# Patient Record
Sex: MALE | Race: WHITE | Employment: OTHER | URBAN - METROPOLITAN AREA
[De-identification: names, ages, dates, MRNs, and addresses within clinical notes are randomized per-mention and may not be internally consistent; named-entity substitution may affect disease eponyms.]

---

## 2017-02-13 ENCOUNTER — TELEPHONE (OUTPATIENT)
Dept: FAMILY MEDICINE | Facility: CLINIC | Age: 62
End: 2017-02-13

## 2017-02-13 DIAGNOSIS — E11.42 TYPE 2 DIABETES MELLITUS WITH DIABETIC POLYNEUROPATHY, WITHOUT LONG-TERM CURRENT USE OF INSULIN (H): ICD-10-CM

## 2017-02-13 DIAGNOSIS — E13.40 DIABETIC NEUROPATHY ASSOCIATED WITH OTHER SPECIFIED DIABETES MELLITUS: Primary | ICD-10-CM

## 2017-02-13 RX ORDER — GLIMEPIRIDE 4 MG/1
4 TABLET ORAL
Qty: 90 TABLET | Refills: 1 | Status: SHIPPED | OUTPATIENT
Start: 2017-02-13 | End: 2018-03-29

## 2017-02-13 NOTE — TELEPHONE ENCOUNTER
Glyburide-Metformin 5-500mg is no longer covered by the patient's insurance. We need to either pursue a prior auth or split the medications up into 2 Rx's. Please let the pharmacy know what you would like to do.      Thanks.    Alexandra Huff, [facility]    On behalf of [facility]

## 2017-02-13 NOTE — TELEPHONE ENCOUNTER
Pari let patient know that insurance not covering current combo med and requesting two. Will send prescription and please notify that change made.

## 2017-02-27 DIAGNOSIS — E11.42 TYPE 2 DIABETES MELLITUS WITH DIABETIC POLYNEUROPATHY, WITHOUT LONG-TERM CURRENT USE OF INSULIN (H): ICD-10-CM

## 2017-02-27 DIAGNOSIS — E13.40 DIABETIC NEUROPATHY ASSOCIATED WITH OTHER SPECIFIED DIABETES MELLITUS: ICD-10-CM

## 2017-02-27 NOTE — TELEPHONE ENCOUNTER
Dr. Nguyen, please see telephone encounter 2-21-17 from pharmacist Sheree. Sheree spoke with this nurse today stating that new prescription for metformin is twice what is was and the patient is very concerned this is double what was prescribed with glucovance combo medication. Please review and advise for patient clarification.  Ailin

## 2017-03-01 NOTE — TELEPHONE ENCOUNTER
I called and spoke with patient  He is not happy and will NOT take an increased dose of metformin, has to many side effects, he is up set as his insurance no longer will cover gluco rodriguez (which he alredy does not take consistently due to side effects)  Currently he is taking 1 tab per day and occ 2 tabs BID, but has many side effects  He is tired and frustrated with having diabetes and having the medical field hound him with repeated letters and changes    I tried to discussed other option diet change , weight loss or different medication   He is just tired of it all, he has had some bad experiences with the medical field in past  At this time he will continue taking the gluco rodriguez  At least one tab per day , watch  His diet and get an  A1c done in next couple months  And make a f/u appt   Will call as need  I did offer the DE and he declined   [facility]  Clinic  RN/Calin Silva

## 2017-03-01 NOTE — TELEPHONE ENCOUNTER
Ok. Updated prescription.    Lab Results   Component Value Date    A1C 7.2 08/09/2016          Patient is overdue for follow-up so can discuss effectiveness then.

## 2017-03-15 DIAGNOSIS — I10 HYPERTENSION GOAL BP (BLOOD PRESSURE) < 140/80: ICD-10-CM

## 2017-03-15 RX ORDER — LISINOPRIL 20 MG/1
20 TABLET ORAL DAILY
Qty: 30 TABLET | Refills: 0 | Status: SHIPPED | OUTPATIENT
Start: 2017-03-15 | End: 2017-04-14

## 2017-03-15 NOTE — TELEPHONE ENCOUNTER
Recommend BP check. Recommend BPGAP. Offer MTM.  Prescription approved per [facility] Refill Protocol.      Kristina Sarah, Pharm.D.  on behalf of [facility] Pharmacist   [email or web address]

## 2017-03-15 NOTE — TELEPHONE ENCOUNTER
Lisinopril      Last Written Prescription Date: 08/09/2016  Last Fill Quantity: 90, # refills: 1  Last Office Visit with G, P or [facility] prescribing provider: 08/09/2016       Potassium   Date Value Ref Range Status   08/09/2016 4.3 3.4 - 5.3 mmol/L Final     Creatinine   Date Value Ref Range Status   08/09/2016 1.11 0.66 - 1.25 mg/dL Final     BP Readings from Last 3 Encounters:   10/04/16 (!) 148/92   08/09/16 120/80   12/08/15 137/80     Mary Wharton, [facility]  Float Technician  Calin Silva

## 2017-03-21 ENCOUNTER — TELEPHONE (OUTPATIENT)
Dept: FAMILY MEDICINE | Facility: CLINIC | Age: 62
End: 2017-03-21

## 2017-03-21 DIAGNOSIS — E11.42 TYPE 2 DIABETES MELLITUS WITH DIABETIC POLYNEUROPATHY, WITHOUT LONG-TERM CURRENT USE OF INSULIN (H): Primary | ICD-10-CM

## 2017-03-21 NOTE — TELEPHONE ENCOUNTER
Due to poor adherence to metformin and glimepiride medications separately, pharmacy called insurance to obtain approval for glucovance 500/5 po bid. Pt has requested to resume medication and discontinue most recent orders for metformin and glimepiride.    Please resend new order.    Sheree Smith, [facility]  [phone]

## 2017-03-21 NOTE — TELEPHONE ENCOUNTER
Is this ok to send for Glucovance 500/5 po Bid?  Please see message below?    Thank you  Sofia GROSSMAN RN

## 2017-03-21 NOTE — TELEPHONE ENCOUNTER
Received response from [employer] [facility] 03/21/2017 Re: Glyburide/Metformin    Response provided to the pharmacy.        Lopez Kline RT (r)  [facility]

## 2017-03-24 RX ORDER — GLYBURIDE-METFORMIN HYDROCHLORIDE 5; 500 MG/1; MG/1
2 TABLET ORAL 2 TIMES DAILY WITH MEALS
Qty: 360 TABLET | Refills: 0 | Status: SHIPPED | OUTPATIENT
Start: 2017-03-24 | End: 2017-07-14

## 2017-04-14 DIAGNOSIS — I10 HYPERTENSION GOAL BP (BLOOD PRESSURE) < 140/80: ICD-10-CM

## 2017-04-14 RX ORDER — LISINOPRIL 20 MG/1
TABLET ORAL
Qty: 90 TABLET | Refills: 0 | Status: SHIPPED | OUTPATIENT
Start: 2017-04-14 | End: 2017-07-14

## 2017-04-14 NOTE — TELEPHONE ENCOUNTER
Prescription approved per [facility] Refill Protocol.  Dolly Gaona RN

## 2017-05-02 ENCOUNTER — TELEPHONE (OUTPATIENT)
Dept: FAMILY MEDICINE | Facility: CLINIC | Age: 62
End: 2017-05-02

## 2017-05-02 NOTE — LETTER
[facility]  [address] [place] [id number] [phone]      May 2, 2017      Juan Carlos Rizo  [address]  [place] [address]        Dear Juan Carlos,     As part of [facility]'s commitment to health and wellness we have recently reviewed your chart and your medical record indicates that you are due for one or more of the following:    -- A diabetic /  Blood pressure check appointment with fasting blood work. The last lab results we have for you were from 8/9/2016. We like to see you every 6 months to be sure we are doing everything we can to keep your diabetes under control. Please call to schedule an appointment. Also, plan to come fasting at least 8-10 hours prior to your appointment.    -- Diabetic eye exam. Blood sugar levels can negatively affect your eyesight, this is why it is recommended to have an eye exam once a year. Please call your eye clinic to schedule an appointment. If you have already completed your eye exam for the year please have your eye clinic fax us your last office visit notes to [phone] so we can update your chart.    -- Colonoscopy. The last FIT that we have on file for you was from 10/2010. Your FIT test was positive and at that time it was recommended that you have a full colonoscopy. I do not see that this was ever completed. Please call one of the following numbers to schedule a colonoscopy:  [facility] [phone]  [facility] [phone]  [facility] [phone]  [facility] [phone] (multiple sites, call for locations)    Please try to schedule and/or complete the tests above within the next 2-4 weeks.   The number to call to schedule an appointment at [facility] is [phone].    While we work hard to maintain accurate records on all our patients, it is always possible that this notice does not accurately reflect tests that you may have had. To ensure that we do not continue to send you notices  please verify, at your next visit or by a [employer] message, that we have accurate dates of your tests, even if these were done many years ago.     Working together for your health is our goal.    Thank you for choosing [facility] for your healthcare needs.      Sincerely,     Rangel Nguyen MD / kate

## 2017-05-02 NOTE — TELEPHONE ENCOUNTER
Panel Management Review      Patient has the following on his problem list:     Diabetes    ASA: Passed    Last A1C  Lab Results   Component Value Date    A1C 7.2 08/09/2016    A1C 8.6 04/04/2016    A1C 8.4 12/08/2015    A1C 11.8 06/26/2015    A1C 9.1 06/10/2014     A1C tested: Passed    Last LDL:    Lab Results   Component Value Date    CHOL 178 06/01/2012     Lab Results   Component Value Date    HDL 40 06/01/2012     Lab Results   Component Value Date    LDL 67 12/08/2015    LDL 87 06/01/2012     Lab Results   Component Value Date    TRIG 255 06/01/2012     Lab Results   Component Value Date    CHOLHDLRATIO 4.0 06/01/2012     No results found for: NHDL    Is the patient on a Statin? YES             Is the patient on Aspirin? YES    Medications     HMG CoA Reductase Inhibitors    simvastatin (ZOCOR) 40 MG tablet    Salicylates    aspirin 81 MG tablet          Last three blood pressure readings:  BP Readings from Last 3 Encounters:   10/04/16 (!) 148/92   08/09/16 120/80   12/08/15 137/80       Date of last diabetes office visit: 8/9/16     Tobacco History:     History   Smoking Status     Never Smoker   Smokeless Tobacco     Never Used           IVD   ASA: Passed    Last LDL:    Lab Results   Component Value Date    CHOL 178 06/01/2012     Lab Results   Component Value Date    HDL 40 06/01/2012     Lab Results   Component Value Date    LDL 67 12/08/2015    LDL 87 06/01/2012     Lab Results   Component Value Date    TRIG 255 06/01/2012        Lab Results   Component Value Date    CHOLHDLRATIO 4.0 06/01/2012        Is the patient on a Statin? YES   Is the patient on Aspirin? YES                  Medications     HMG CoA Reductase Inhibitors    simvastatin (ZOCOR) 40 MG tablet    Salicylates    aspirin 81 MG tablet          Last three blood pressure readings:  BP Readings from Last 3 Encounters:   10/04/16 (!) 148/92   08/09/16 120/80   12/08/15 137/80        Tobacco History:     History   Smoking Status     Never  Smoker   Smokeless Tobacco     Never Used       Hypertension   Last three blood pressure readings:  BP Readings from Last 3 Encounters:   10/04/16 (!) 148/92   08/09/16 120/80   12/08/15 137/80     Blood pressure: FAILED    HTN Guidelines:  Age 18-59 BP range:  Less than 140/90  Age 60-85 with Diabetes:  Less than 140/90  Age 60-85 without Diabetes:  less than 150/90      Composite cancer screening  Chart review shows that this patient is due/due soon for the following Colonoscopy (positive FIT test 10/2010)  Summary:    Patient is due/failing the following:   Blood pressure check, DM office visit, lipid, A1c, microalbumin, eye exam, pneumovax/[id number], colonoscopy (positive FIT test 10/2010)    Action needed:   Referral for colonoscopy, DM eye exam  Needs office visit for DM/BP recheck office visit with fasting labs-can update pneumovax/[id number] at this time    Type of outreach:    Sent letter.    Questions for provider review:    None                                                                                                                                    Gay LOPEZ       Chart routed to none .

## 2017-05-08 DIAGNOSIS — E13.40 DIABETIC NEUROPATHY ASSOCIATED WITH OTHER SPECIFIED DIABETES MELLITUS: ICD-10-CM

## 2017-05-08 DIAGNOSIS — E11.42 TYPE 2 DIABETES MELLITUS WITH DIABETIC POLYNEUROPATHY, WITHOUT LONG-TERM CURRENT USE OF INSULIN (H): ICD-10-CM

## 2017-05-08 LAB — HBA1C MFR BLD: 9.4 % (ref 4.3–6)

## 2017-05-08 PROCEDURE — 83036 HEMOGLOBIN GLYCOSYLATED A1C: CPT | Performed by: FAMILY MEDICINE

## 2017-05-08 PROCEDURE — 36415 COLL VENOUS BLD VENIPUNCTURE: CPT | Performed by: FAMILY MEDICINE

## 2017-07-07 ENCOUNTER — TELEPHONE (OUTPATIENT)
Dept: FAMILY MEDICINE | Facility: CLINIC | Age: 62
End: 2017-07-07

## 2017-07-07 NOTE — TELEPHONE ENCOUNTER
Panel Management Review      Patient has the following on his problem list:     Diabetes    ASA: Passed    Last A1C  Lab Results   Component Value Date    A1C 9.4 05/08/2017    A1C 7.2 08/09/2016    A1C 8.6 04/04/2016    A1C 8.4 12/08/2015    A1C 11.8 06/26/2015     A1C tested: FAILED    Last LDL:    Lab Results   Component Value Date    CHOL 178 06/01/2012     Lab Results   Component Value Date    HDL 40 06/01/2012     Lab Results   Component Value Date    LDL 67 12/08/2015    LDL 87 06/01/2012     Lab Results   Component Value Date    TRIG 255 06/01/2012     Lab Results   Component Value Date    CHOLHDLRATIO 4.0 06/01/2012     No results found for: NHDL    Is the patient on a Statin? YES             Is the patient on Aspirin? YES    Medications     HMG CoA Reductase Inhibitors    simvastatin (ZOCOR) 40 MG tablet    Salicylates    aspirin 81 MG tablet          Last three blood pressure readings:  BP Readings from Last 3 Encounters:   10/04/16 (!) 148/92   08/09/16 120/80   12/08/15 137/80       Date of last diabetes office visit: last A1c 5/8/17, last DM office visit 8/9/16     Tobacco History:     History   Smoking Status     Never Smoker   Smokeless Tobacco     Never Used           IVD   ASA: Passed    Last LDL:    Lab Results   Component Value Date    CHOL 178 06/01/2012     Lab Results   Component Value Date    HDL 40 06/01/2012     Lab Results   Component Value Date    LDL 67 12/08/2015    LDL 87 06/01/2012     Lab Results   Component Value Date    TRIG 255 06/01/2012        Lab Results   Component Value Date    CHOLHDLRATIO 4.0 06/01/2012        Is the patient on a Statin? YES   Is the patient on Aspirin? YES                  Medications     HMG CoA Reductase Inhibitors    simvastatin (ZOCOR) 40 MG tablet    Salicylates    aspirin 81 MG tablet          Last three blood pressure readings:  BP Readings from Last 3 Encounters:   10/04/16 (!) 148/92   08/09/16 120/80   12/08/15 137/80        Tobacco  History:     History   Smoking Status     Never Smoker   Smokeless Tobacco     Never Used       Hypertension   Last three blood pressure readings:  BP Readings from Last 3 Encounters:   10/04/16 (!) 148/92   08/09/16 120/80   12/08/15 137/80     Blood pressure: Passed    HTN Guidelines:  Age 18-59 BP range:  Less than 140/90  Age 60-85 with Diabetes:  Less than 140/90  Age 60-85 without Diabetes:  less than 150/90      Composite cancer screening  Chart review shows that this patient is due/due soon for the following Colonoscopy - positive FIT test 10/2010  Summary:    Patient is due/failing the following:   BP recheck  DM office visit w/ fasting labs  DM eye exam  Colonoscopy/FIT - had positive FIT 10/2010 - colonoscopy was recommended but not done    Action needed:   None at this time.   He had labs done on 5/8/17 and Dr Nguyen recommended a visit on his lab notes that were sent to the patient. See lab result note.     Type of outreach:    none - see above    Questions for provider review:    None                                                                                                                                    Gay LOPEZ       Chart routed to none .

## 2017-07-14 DIAGNOSIS — I10 HYPERTENSION GOAL BP (BLOOD PRESSURE) < 140/80: ICD-10-CM

## 2017-07-14 DIAGNOSIS — E11.42 TYPE 2 DIABETES MELLITUS WITH DIABETIC POLYNEUROPATHY, WITHOUT LONG-TERM CURRENT USE OF INSULIN (H): ICD-10-CM

## 2017-07-14 RX ORDER — GLYBURIDE-METFORMIN HYDROCHLORIDE 5; 500 MG/1; MG/1
TABLET ORAL
Qty: 360 TABLET | Refills: 0 | Status: SHIPPED | OUTPATIENT
Start: 2017-07-14 | End: 2017-10-19

## 2017-07-14 RX ORDER — LISINOPRIL 20 MG/1
TABLET ORAL
Qty: 90 TABLET | Refills: 0 | Status: SHIPPED | OUTPATIENT
Start: 2017-07-14 | End: 2017-10-19

## 2017-07-14 NOTE — TELEPHONE ENCOUNTER
glyBURIDE-metFORMIN (GLUCOVANCE) 5-500 MG per tablet         Last Written Prescription Date: 3/24/17  Last Fill Quantity: 360, # refills: 0  Last Office Visit with [facility], [facility] or [facility] prescribing provider:  8/9/16        BP Readings from Last 3 Encounters:   10/04/16 (!) 148/92   08/09/16 120/80   12/08/15 137/80     Lab Results   Component Value Date    MICROL 6 06/26/2015     Lab Results   Component Value Date    UMALCR 11.56 06/26/2015     Creatinine   Date Value Ref Range Status   08/09/2016 1.11 0.66 - 1.25 mg/dL Final   ]  GFR Estimate   Date Value Ref Range Status   08/09/2016 68 >60 mL/min/1.7m2 Final     Comment:     Non  GFR Calc   06/26/2015 76 >60 mL/min/1.7m2 Final     Comment:     Non  GFR Calc   11/04/2013 89 >60 mL/min/1.7m2 Final   11/04/2013 >90 >60 mL/min/1.7m2 Final     GFR Estimate If Black   Date Value Ref Range Status   08/09/2016 82 >60 mL/min/1.7m2 Final     Comment:      GFR Calc   06/26/2015 >90   GFR Calc   >60 mL/min/1.7m2 Final   11/04/2013 >90 >60 mL/min/1.7m2 Final   11/04/2013 >90 >60 mL/min/1.7m2 Final     Lab Results   Component Value Date    CHOL 178 06/01/2012     Lab Results   Component Value Date    HDL 40 06/01/2012     Lab Results   Component Value Date    LDL 67 12/08/2015    LDL 87 06/01/2012     Lab Results   Component Value Date    TRIG 255 06/01/2012     Lab Results   Component Value Date    CHOLHDLRATIO 4.0 06/01/2012     Lab Results   Component Value Date    AST 27 08/04/2009     Lab Results   Component Value Date    ALT 26 06/01/2012     Lab Results   Component Value Date    A1C 9.4 05/08/2017    A1C 7.2 08/09/2016    A1C 8.6 04/04/2016    A1C 8.4 12/08/2015    A1C 11.8 06/26/2015     Potassium   Date Value Ref Range Status   08/09/2016 4.3 3.4 - 5.3 mmol/L Final             lisinopril (PRINIVIL/ZESTRIL) 20 MG tablet      Last Written Prescription Date: 4/14/17  Last Fill Quantity: 90, # refills: 0  Last  Office Visit with G, P or [facility] prescribing provider: 8/9/16       Potassium   Date Value Ref Range Status   08/09/2016 4.3 3.4 - 5.3 mmol/L Final     Creatinine   Date Value Ref Range Status   08/09/2016 1.11 0.66 - 1.25 mg/dL Final     BP Readings from Last 3 Encounters:   10/04/16 (!) 148/92   08/09/16 120/80   12/08/15 137/80

## 2017-07-14 NOTE — TELEPHONE ENCOUNTER
Routing refill request to provider for review/approval because:  Patient was to follow-up in clinic. Overdue for visit. America Vera RN

## 2018-01-18 DIAGNOSIS — E11.42 TYPE 2 DIABETES MELLITUS WITH DIABETIC POLYNEUROPATHY, WITHOUT LONG-TERM CURRENT USE OF INSULIN (H): ICD-10-CM

## 2018-01-18 DIAGNOSIS — I10 HYPERTENSION GOAL BP (BLOOD PRESSURE) < 140/80: ICD-10-CM

## 2018-01-19 NOTE — TELEPHONE ENCOUNTER
"Requested Prescriptions   Pending Prescriptions Disp Refills     glyBURIDE-metFORMIN (GLUCOVANCE) 5-500 MG per tablet [Pharmacy Med Name: GLYBURIDE-METFORMIN 5-500MG TABS] 360 tablet 0    Last Written Prescription Date:  10/20/2017 #360 x 0  Last filled 10/20/2017  Last Office Visit with [facility], [facility] or [facility] prescribing provider:  08/09/2016 GLADYS Nguyen   Future Office Visit:  none    Sig: TAKE TWO TABLETS BY MOUTH TWICE A DAY WITH MEALS    Combination Oral Antihyperglycemic Agents Failed    1/18/2018  9:42 AM       Failed - Patient's BP is less than 140/90    BP Readings from Last 3 Encounters:   10/04/16 (!) 148/92   08/09/16 120/80   12/08/15 137/80                Failed - Patient has a documented LDL level within past 12 mos.    Recent Labs   Lab Test  12/08/15   1626   LDL  67            Failed - Patient has a documented Microalbumin level within past 12 mos.    Recent Labs   Lab Test  06/26/15   1423   MICROL  6   UMALCR  11.56            Failed - Patient has documented A1c within the specified period of time.    Recent Labs   Lab Test  05/08/17   1427   A1C  9.4*            Failed - Patient has had appt within past 6 mos    Patient had office visit in the last 6 months or has a visit in the next 30 days with authorizing provider.  See \"Patient Info\" tab in inbasket, or \"Choose Columns\" in Meds & Orders section of the refill encounter.           Passed - Patient's CR is NOT>1.4 OR Patient's EGFR is NOT<45 within past 12 mos.    Recent Labs   Lab Test  08/09/16   1400   GFRESTIMATED  68   GFRESTBLACK  82       Recent Labs   Lab Test  08/09/16   1400   CR  1.11            Passed - Patient does not have a diagnosis of CHF.       Passed - Patient is 18 years old or older.        lisinopril (PRINIVIL/ZESTRIL) 20 MG tablet [Pharmacy Med Name: LISINOPRIL 20MG TABS] 90 tablet 0    Last Written Prescription Date:  10/20/2017 #90 x 0  Last filled 10/20/2017  Last Office Visit with [facility], [facility] or [facility] prescribing " "provider:  08/09/2016 GLADYS Nguyen   Future Office Visit:   none   Sig: TAKE ONE TABLET BY MOUTH EVERY DAY. -- DUE FOR LABS IN JUNE    ACE Inhibitors (Including Combos) Protocol Failed    1/18/2018  9:42 AM       Failed - Blood pressure under 140/90    BP Readings from Last 3 Encounters:   10/04/16 (!) 148/92   08/09/16 120/80   12/08/15 137/80                Failed - Recent or future visit with authorizing provider's specialty    Patient had office visit in the last year or has a visit in the next 30 days with authorizing provider.  See \"Patient Info\" tab in inbasket, or \"Choose Columns\" in Meds & Orders section of the refill encounter.            Failed - Normal serum creatinine on file in past 12 months    Recent Labs   Lab Test  08/09/16   1400   CR  1.11            Failed - Normal serum potassium on file in past 12 months    Recent Labs   Lab Test  08/09/16   1400   POTASSIUM  4.3            Passed - Patient is age 18 or older          "

## 2018-01-23 RX ORDER — LISINOPRIL 20 MG/1
20 TABLET ORAL DAILY
Qty: 30 TABLET | Refills: 0 | Status: SHIPPED | OUTPATIENT
Start: 2018-01-23 | End: 2018-02-16

## 2018-01-23 RX ORDER — GLYBURIDE-METFORMIN HYDROCHLORIDE 5; 500 MG/1; MG/1
2 TABLET ORAL 2 TIMES DAILY WITH MEALS
Qty: 120 TABLET | Refills: 0 | Status: SHIPPED | OUTPATIENT
Start: 2018-01-23 | End: 2018-02-16

## 2018-01-23 NOTE — TELEPHONE ENCOUNTER
Medication is being filled for 1 time refill only due to: 30 days only  Over due for office visit and/or labs   LEÓN Baca  RN/Calin Silva

## 2018-02-16 DIAGNOSIS — E11.42 TYPE 2 DIABETES MELLITUS WITH DIABETIC POLYNEUROPATHY, WITHOUT LONG-TERM CURRENT USE OF INSULIN (H): ICD-10-CM

## 2018-02-16 DIAGNOSIS — I10 HYPERTENSION GOAL BP (BLOOD PRESSURE) < 140/80: ICD-10-CM

## 2018-02-16 NOTE — TELEPHONE ENCOUNTER
"Requested Prescriptions   Pending Prescriptions Disp Refills     glyBURIDE-metFORMIN (GLUCOVANCE) 5-500 MG per tablet 120 tablet 0    Last Written Prescription Date:  01/23/2018  #120 x 0  Last filled - not provided  Last office visit: 8/9/2016 GLADYS Nguyen   Future Office Visit: none    Sig: Take 2 tablets by mouth 2 times daily (with meals) MUSTbe seenin office by new provider to establish care for ongoing refills plz make appt now [phone]    Combination Oral Antihyperglycemic Agents Failed    2/16/2018 12:15 PM       Failed - Blood pressure under 140/90 in past 12 months    BP Readings from Last 3 Encounters:   10/04/16 (!) 148/92   08/09/16 120/80   12/08/15 137/80                Failed - Patient has a documented LDL level within past 12 mos.    Recent Labs   Lab Test  12/08/15   1626   LDL  67            Failed - Patient has a documented Microalbumin level within past 12 mos.    Recent Labs   Lab Test  06/26/15   1423   MICROL  6   UMALCR  11.56            Failed - Patient has documented A1c within the specified period of time.    Recent Labs   Lab Test  05/08/17   1427   A1C  9.4*            Failed - Patient has had appt within past 6 mos    Patient had office visit in the last 6 months or has a visit in the next 30 days with authorizing provider.  See \"Patient Info\" tab in inbasket, or \"Choose Columns\" in Meds & Orders section of the refill encounter.           Passed - Patient's CR is NOT>1.4 OR Patient's EGFR is NOT<45 within past 12 mos.    Recent Labs   Lab Test  08/09/16   1400   GFRESTIMATED  68   GFRESTBLACK  82       Recent Labs   Lab Test  08/09/16   1400   CR  1.11            Passed - Patient does not have a diagnosis of CHF.       Passed - Patient is 18 years old or older.        lisinopril (PRINIVIL/ZESTRIL) 20 MG tablet 30 tablet 0    Last Written Prescription Date:  01/23/2018 #30 x 0  Last filled - not provided  Last office visit: 8/9/2016 GLADYS Nguyen  Future Office Visit:  none   Sig: " "Take 1 tablet (20 mg) by mouth daily MUSTbe seenin office by new provider to establish care for ongoing refills plz make appt now [phone]    ACE Inhibitors (Including Combos) Protocol Failed    2/16/2018 12:15 PM       Failed - Blood pressure under 140/90 in past 12 months    BP Readings from Last 3 Encounters:   10/04/16 (!) 148/92   08/09/16 120/80   12/08/15 137/80                Failed - Recent or future visit with authorizing provider's specialty    Patient had office visit in the last year or has a visit in the next 30 days with authorizing provider.  See \"Patient Info\" tab in inbasket, or \"Choose Columns\" in Meds & Orders section of the refill encounter.            Failed - Normal serum creatinine on file in past 12 months    Recent Labs   Lab Test  08/09/16   1400   CR  1.11            Failed - Normal serum potassium on file in past 12 months    Recent Labs   Lab Test  08/09/16   1400   POTASSIUM  4.3            Passed - Patient is age 18 or older          "

## 2018-02-20 RX ORDER — GLYBURIDE-METFORMIN HYDROCHLORIDE 5; 500 MG/1; MG/1
2 TABLET ORAL 2 TIMES DAILY WITH MEALS
Qty: 120 TABLET | Refills: 0 | Status: SHIPPED | OUTPATIENT
Start: 2018-02-20 | End: 2018-03-16

## 2018-02-20 RX ORDER — LISINOPRIL 20 MG/1
20 TABLET ORAL DAILY
Qty: 30 TABLET | Refills: 0 | Status: SHIPPED | OUTPATIENT
Start: 2018-02-20 | End: 2018-03-16

## 2018-03-16 DIAGNOSIS — I10 HYPERTENSION GOAL BP (BLOOD PRESSURE) < 140/80: ICD-10-CM

## 2018-03-16 DIAGNOSIS — E11.42 TYPE 2 DIABETES MELLITUS WITH DIABETIC POLYNEUROPATHY, WITHOUT LONG-TERM CURRENT USE OF INSULIN (H): ICD-10-CM

## 2018-03-19 NOTE — TELEPHONE ENCOUNTER
"Requested Prescriptions   Pending Prescriptions Disp Refills     glyBURIDE-metFORMIN (GLUCOVANCE) 5-500 MG per tablet 120 tablet 0    Last Written Prescription Date:  02/20/2018 #120 x 0  Last filled - not provided  Last office visit: 8/9/2016 GLADYS Nguyen  Future Office Visit: None     Sig: Take 2 tablets by mouth 2 times daily (with meals) (Needs follow-up appointment for this medication)    Combination Oral Antihyperglycemic Agents Failed    3/16/2018  1:05 PM       Failed - Blood pressure under 140/90 in past 12 months    BP Readings from Last 3 Encounters:   10/04/16 (!) 148/92   08/09/16 120/80   12/08/15 137/80                Failed - Patient has a documented LDL level within past 12 mos.    Recent Labs   Lab Test  12/08/15   1626   LDL  67            Failed - Patient has a documented Microalbumin level within past 12 mos.    Recent Labs   Lab Test  06/26/15   1423   MICROL  6   UMALCR  11.56            Failed - Patient has documented A1c within the specified period of time.    Recent Labs   Lab Test  05/08/17   1427   A1C  9.4*            Failed - Recent (6 mo) or future (30 days) visit within the authorizing provider's specialty    Patient had office visit in the last 6 months or has a visit in the next 30 days with authorizing provider or within the authorizing provider's specialty.  See \"Patient Info\" tab in inbasket, or \"Choose Columns\" in Meds & Orders section of the refill encounter.           Passed - Patient's CR is NOT>1.4 OR Patient's EGFR is NOT<45 within past 12 mos.    Recent Labs   Lab Test  08/09/16   1400   GFRESTIMATED  68   GFRESTBLACK  82       Recent Labs   Lab Test  08/09/16   1400   CR  1.11            Passed - Patient does not have a diagnosis of CHF.       Passed - Patient is 18 years old or older.        lisinopril (PRINIVIL/ZESTRIL) 20 MG tablet 30 tablet 0    Last Written Prescription Date:  02/20/2018 #30 x 0  Last filled - not provided  Last office visit: 8/9/2016 GLADYS Nguyen " "  Future Office Visit:  None   Sig: Take 1 tablet (20 mg) by mouth daily (Needs follow-up appointment for this medication)    ACE Inhibitors (Including Combos) Protocol Failed    3/16/2018  1:05 PM       Failed - Blood pressure under 140/90 in past 12 months    BP Readings from Last 3 Encounters:   10/04/16 (!) 148/92   08/09/16 120/80   12/08/15 137/80                Failed - Recent (12 mo) or future (30 days) visit within the authorizing provider's specialty    Patient had office visit in the last 12 months or has a visit in the next 30 days with authorizing provider or within the authorizing provider's specialty.  See \"Patient Info\" tab in inbasket, or \"Choose Columns\" in Meds & Orders section of the refill encounter.           Failed - Normal serum creatinine on file in past 12 months    Recent Labs   Lab Test  08/09/16   1400   CR  1.11            Failed - Normal serum potassium on file in past 12 months    Recent Labs   Lab Test  08/09/16   1400   POTASSIUM  4.3            Passed - Patient is age 18 or older          "

## 2018-03-20 NOTE — TELEPHONE ENCOUNTER
Routing refill request to provider for review/approval because:  Overdue for labs and visit. America Vera RN

## 2018-03-20 NOTE — TELEPHONE ENCOUNTER
Please advise office visit follow-up since no office visit sine 8/2016 .  Melvi refill was provided last month.  Ok to refill for 14 days to allow time for patient to see provider.  Marlena Saldivar MD

## 2018-03-23 RX ORDER — GLYBURIDE-METFORMIN HYDROCHLORIDE 5; 500 MG/1; MG/1
2 TABLET ORAL 2 TIMES DAILY WITH MEALS
Qty: 56 TABLET | Refills: 0 | Status: SHIPPED | OUTPATIENT
Start: 2018-03-23 | End: 2018-03-29

## 2018-03-23 RX ORDER — LISINOPRIL 20 MG/1
20 TABLET ORAL DAILY
Qty: 14 TABLET | Refills: 0 | Status: SHIPPED | OUTPATIENT
Start: 2018-03-23 | End: 2018-03-29

## 2018-03-24 ENCOUNTER — HEALTH MAINTENANCE LETTER (OUTPATIENT)
Age: 63
End: 2018-03-24

## 2018-03-29 ENCOUNTER — OFFICE VISIT (OUTPATIENT)
Dept: FAMILY MEDICINE | Facility: CLINIC | Age: 63
End: 2018-03-29
Payer: COMMERCIAL

## 2018-03-29 DIAGNOSIS — E11.9 ENCOUNTER FOR DIABETIC FOOT EXAM (H): ICD-10-CM

## 2018-03-29 DIAGNOSIS — E78.5 HYPERLIPIDEMIA LDL GOAL <100: ICD-10-CM

## 2018-03-29 DIAGNOSIS — E11.42 TYPE 2 DIABETES MELLITUS WITH DIABETIC POLYNEUROPATHY, WITHOUT LONG-TERM CURRENT USE OF INSULIN (H): Primary | ICD-10-CM

## 2018-03-29 DIAGNOSIS — I10 HYPERTENSION GOAL BP (BLOOD PRESSURE) < 140/80: ICD-10-CM

## 2018-03-29 LAB — HBA1C MFR BLD: 10.5 % (ref 0–6.4)

## 2018-03-29 PROCEDURE — 99207 C FOOT EXAM  NO CHARGE: CPT | Performed by: NURSE PRACTITIONER

## 2018-03-29 PROCEDURE — 82043 UR ALBUMIN QUANTITATIVE: CPT | Performed by: NURSE PRACTITIONER

## 2018-03-29 PROCEDURE — 83036 HEMOGLOBIN GLYCOSYLATED A1C: CPT | Performed by: NURSE PRACTITIONER

## 2018-03-29 PROCEDURE — 99215 OFFICE O/P EST HI 40 MIN: CPT | Performed by: NURSE PRACTITIONER

## 2018-03-29 PROCEDURE — 80061 LIPID PANEL: CPT | Performed by: NURSE PRACTITIONER

## 2018-03-29 PROCEDURE — 84443 ASSAY THYROID STIM HORMONE: CPT | Performed by: NURSE PRACTITIONER

## 2018-03-29 PROCEDURE — 36415 COLL VENOUS BLD VENIPUNCTURE: CPT | Performed by: NURSE PRACTITIONER

## 2018-03-29 PROCEDURE — 80048 BASIC METABOLIC PNL TOTAL CA: CPT | Performed by: NURSE PRACTITIONER

## 2018-03-29 RX ORDER — LISINOPRIL 20 MG/1
20 TABLET ORAL DAILY
Qty: 90 TABLET | Refills: 0 | Status: SHIPPED | OUTPATIENT
Start: 2018-03-29 | End: 2018-08-15

## 2018-03-29 RX ORDER — GLYBURIDE-METFORMIN HYDROCHLORIDE 5; 500 MG/1; MG/1
TABLET ORAL
Qty: 270 TABLET | Refills: 0 | Status: SHIPPED | OUTPATIENT
Start: 2018-03-29 | End: 2018-08-15

## 2018-03-29 ASSESSMENT — ENCOUNTER SYMPTOMS
NAUSEA: 1
ENDOCRINE COMMENTS: HX OF TYPE 2 DM
WHEEZING: 0
PALPITATIONS: 0
CHOKING: 0
SHORTNESS OF BREATH: 0
VOMITING: 0
NUMBNESS: 1
CONSTIPATION: 0
ALLERGIC/IMMUNOLOGIC NEGATIVE: 1
PSYCHIATRIC NEGATIVE: 1
ABDOMINAL PAIN: 0
HEMATOLOGIC/LYMPHATIC NEGATIVE: 1
CONSTITUTIONAL NEGATIVE: 1
MUSCULOSKELETAL NEGATIVE: 1
DIARRHEA: 1

## 2018-03-29 NOTE — LETTER
March 30, 2018      Juan Carlos Rizo  [address]  [place] [address]        Dear ,    You are spilling protein into your urine.  This means that your kidneys are being damaged for prominent your diabetes being out of control.     Your triglycerides continue to be a bit high but they are better than they were 5 years ago when you had them checked.  Please try to decrease your carbohydrate intake.     Your electrolytes are all in normal range   Your kidney function is normal   Your thyroid is in normal range     As discussed at your appointment yesterday your hemoglobin A1c is up to 10.5 from 9.4.  Need to get this under better control to avoid long-term damage.     Resulted Orders   Albumin Random Urine Quantitative with Creat Ratio   Result Value Ref Range    Creatinine Urine 49 mg/dL    Albumin Urine mg/L 12 mg/L    Albumin Urine mg/g Cr 24.90 (H) 0 - 17 mg/g Cr   Lipid panel reflex to direct LDL Fasting   Result Value Ref Range    Cholesterol 158 <200 mg/dL    Triglycerides 185 (H) <150 mg/dL      Comment:      Borderline high:  150-199 mg/dl  High:             200-499 mg/dl  Very high:       >499 mg/dl  Non Fasting      HDL Cholesterol 43 >39 mg/dL    LDL Cholesterol Calculated 78 <100 mg/dL      Comment:      Desirable:       <100 mg/dl    Non HDL Cholesterol 115 <130 mg/dL   TSH with free T4 reflex   Result Value Ref Range    TSH 1.69 0.40 - 4.00 mU/L   Hemoglobin A1c   Result Value Ref Range    Hemoglobin A1C 10.5 (H) 0 - 6.4 %      Comment:      Normal <5.7% Prediabetes 5.7-6.4%  Diabetes 6.5% or higher - adopted from ADA   consensus guidelines.     Basic metabolic panel  (Ca, Cl, CO2, Creat, Gluc, K, Na, BUN)   Result Value Ref Range    Sodium 134 133 - 144 mmol/L    Potassium 4.2 3.4 - 5.3 mmol/L    Chloride 100 94 - 109 mmol/L    Carbon Dioxide 27 20 - 32 mmol/L    Anion Gap 7 3 - 14 mmol/L    Glucose 179 (H) 70 - 99 mg/dL      Comment:      Non Fasting    Urea Nitrogen 18 7 - 30 mg/dL     Creatinine 1.16 0.66 - 1.25 mg/dL    GFR Estimate 64 >60 mL/min/1.7m2      Comment:      Non  GFR Calc    GFR Estimate If Black 77 >60 mL/min/1.7m2      Comment:       GFR Calc    Calcium 9.0 8.5 - 10.1 mg/dL       If you have any questions or concerns, please call the clinic at the number listed above.       Sincerely,        LATHA Machuca CNP / jg

## 2018-03-29 NOTE — NURSING NOTE
"Chief Complaint   Patient presents with     Recheck Medication       Initial /76  Pulse 72  Temp 98.2  F (36.8  C) (Tympanic)  Ht 6' 2.25\" (1.886 m)  Wt 232 lb 6.4 oz (105.4 kg)  BMI 29.64 kg/m2 Estimated body mass index is 29.64 kg/(m^2) as calculated from the following:    Height as of this encounter: 6' 2.25\" (1.886 m).    Weight as of this encounter: 232 lb 6.4 oz (105.4 kg).  Medication Reconciliation: complete       Gay Guillen CMA([facility])      "

## 2018-03-29 NOTE — PROGRESS NOTES
SUBJECTIVE:   Juan Carlos Rizo is a 62 year old male who presents to clinic today for the following health issues:    Is currently switching eye doctors.    Diabetes Follow-up      Patient is checking blood sugars: not at all    Diabetic concerns: None     Symptoms of hypoglycemia (low blood sugar): none     Paresthesias (numbness or burning in feet) or sores: yes, numbness for years     Date of last diabetic eye exam: over a year ago, patient looking for a new eye doctor that can do surgery    Hyperlipidemia Follow-Up      Rate your low fat/cholesterol diet?: not monitoring fat    Taking statin?  Yes, no muscle aches from statin    Other lipid medications/supplements?:  none    Hypertension Follow-up      Outpatient blood pressures are being checked at work.  Results are 120s/80s.    Low Salt Diet: not monitoring salt    BP Readings from Last 2 Encounters:   03/29/18 140/76   10/04/16 (!) 148/92     Hemoglobin A1C (%)   Date Value   03/29/2018 10.5 (H)   05/08/2017 9.4 (H)     LDL Cholesterol Calculated (mg/dL)   Date Value   03/29/2018 78   06/01/2012 87     LDL Cholesterol Direct (mg/dL)   Date Value   12/08/2015 67       Amount of exercise or physical activity: 6-7 days/week for an average of greater than 60 minutes    Problems taking medications regularly: No    Medication side effects: none    Diet: regular (no restrictions)            Problem list and histories reviewed & adjusted, as indicated.  Additional history: as documented    Patient Active Problem List   Diagnosis     Impotence of organic origin     Metatarsalgia     Type 2 diabetes mellitus (H)     Hyperlipidemia LDL goal <100     Hereditary and idiopathic peripheral neuropathy     Occult blood in stools     Advanced directives, counseling/discussion     Insomnia     Torn meniscus     Hypertension goal BP (blood pressure) < 140/80     Diabetic neuropathy associated with other specified diabetes mellitus     Past Surgical History:   Procedure  Laterality Date     CL AFF SURGICAL PATHOLOGY  1981    Trauma     HC REMOVAL GALLBLADDER      Cholecystectomy     INCISION AND DRAINAGE FINGER, COMBINED  11/4/2013    Procedure: COMBINED INCISION AND DRAINAGE FINGER;  Incision & Drainage right index finger;  Surgeon: Ferdinand Cisneros MD;  Location: [facility]       Social History   Substance Use Topics     Smoking status: Never Smoker     Smokeless tobacco: Never Used     Alcohol use Yes      Comment: Occasional     Family History   Problem Relation Age of Onset     DIABETES Mother      Hypertension Brother      C.A.D. Brother      MI age 47     CEREBROVASCULAR DISEASE No family hx of      Breast Cancer No family hx of      Cancer - colorectal No family hx of      Prostate Cancer No family hx of          Current Outpatient Prescriptions   Medication Sig Dispense Refill     glyBURIDE-metFORMIN (GLUCOVANCE) 5-500 MG per tablet Take 1/2-1 tab daily then 2 tabs in the  tablet 0     lisinopril (PRINIVIL/ZESTRIL) 20 MG tablet Take 1 tablet (20 mg) by mouth daily (Needs follow-up appointment for this medication) 90 tablet 0     simvastatin (ZOCOR) 40 MG tablet TAKE ONE TABLET BY MOUTH AT BEDTIME 90 tablet 3     aspirin 81 MG tablet Take 1 tablet by mouth daily.       BLOOD GLUCOSE TEST STRIPS STRP PER PATIENT HEALTH PLAN 1 bottle rpn     Allergies   Allergen Reactions     Anesthetic [Amides] Nausea and Vomiting     Pt. Denies allergy to local anesthetics.  Had an episode of n/v with prior GA in 1981.  Pt. Has had prior amide local anesthetics without problems.     Recent Labs   Lab Test  03/29/18   1401  05/08/17   1427  08/09/16   1400   12/08/15   1626  06/26/15   1425   06/01/12   1535  06/14/11   1537  09/24/10   1526   A1C  10.5*  9.4*  7.2*   < >  8.4*  11.8*   < >  9.7*  9.6*  10.1*   LDL  78   --    --    --   67   --    --   87  99  114   HDL  43   --    --    --    --    --    --   40   --   49   TRIG  185*   --    --    --    --    --    --   255*   --    "145   ALT   --    --    --    --    --    --    --   26  27   --    CR  1.16   --   1.11   --    --   1.01   < >  1.01  1.14  0.95   GFRESTIMATED  64   --   68   --    --   76   < >  76  67  83   GFRESTBLACK  77   --   82   --    --   >90   GFR Calc     < >  >90  81  >90   POTASSIUM  4.2   --   4.3   --    --   3.7   < >  4.2  4.5  4.1   TSH  1.69   --    --    --    --   1.51   --   1.96   --   1.23    < > = values in this interval not displayed.      BP Readings from Last 3 Encounters:   03/29/18 140/76   10/04/16 (!) 148/92   08/09/16 120/80    Wt Readings from Last 3 Encounters:   03/29/18 232 lb 6.4 oz (105.4 kg)   08/09/16 225 lb 3.2 oz (102.2 kg)   12/08/15 232 lb (105.2 kg)                    Reviewed and updated as needed this visit by clinical staff  Tobacco  Allergies  Meds  Med Hx  Surg Hx  Fam Hx  Soc Hx      Reviewed and updated as needed this visit by Provider        Here today to establish care.  Needs to have refills of medications.  Is diabetic.  Does not check blood pressures at home.  Does not want to start on any additional medication for diabetes.  Has been on higher doses of medications and has felt like \".  Does not eat right away in the morning when he gets up.  Usually does not eat until noon meal.  Shit\" when was taking medications in the morning would have nausea, dizziness, and just overall feel ill.  Was not able to go to work.  Currently is taking 2 Glucovance in the p.m.  Does not ever want to start on insulin or any injectable medications.  Does not like coming to the office.  Does not know why has to come frequently.  Does not want to take glyburide and Metformin separately wants them to remain together in 1 pill.  Has previously needed to have a prior authorization filled out for this.  That PA was good for 1 year and needs to be filled out again.  Reports if has to take 2 pills will take any medications at all.  Found out was diabetic when had to have his " gallbladder emergently removed.  Reports poor diet.  Eats a lot of concentrated sweets.  Did go to diabetic education when was first diagnosed with diabetes feels it was a waste of time.  Has been sometime since was at the eye doctor.  Reports is currently looking for a new eye surgeon.  Just cannot find one that is good enough for him.    Has numbness and tingling to bilateral feet, reports that has been there for some time.  Reports has been there ever since was a child.  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  Does not check feet for sores.  Has had a previous injury to right eye where a daiana went through his eye.  This was a work-related issue.  Has no vision in his right eye.  States it is hard for him to even see his feet.  Has no intentions of starting to check them.    Has never had colonoscopy.  Declines wanting to have one.  Declines fit test.      ROS:  Review of Systems   Constitutional: Negative.  Negative for fatigue.   HENT: Negative.  Negative for congestion, ear pain, rhinorrhea, sinus pressure and sore throat.    Eyes:        Hx of right eye trauma   Respiratory: Negative for cough, choking, shortness of breath and wheezing.    Cardiovascular: Negative for chest pain, palpitations and leg swelling.        Hx of hyperlipidemia, htn     Gastrointestinal: Positive for diarrhea (side effect of medications) and nausea (side effect of medication). Negative for abdominal pain, constipation and vomiting.   Endocrine:        Hx of type 2 DM     Genitourinary: Negative for dysuria and frequency.   Musculoskeletal: Negative.  Negative for arthralgias and joint swelling.   Skin: Negative.  Negative for rash.   Allergic/Immunologic: Negative.    Neurological: Positive for numbness (both feet). Negative for dizziness, light-headedness and headaches.   Hematological: Negative.    Psychiatric/Behavioral: Negative.          OBJECTIVE:     /76  Pulse 72  Temp 98.2  F (36.8  C)  "(Tympanic)  Ht 6' 2.25\" (1.886 m)  Wt 232 lb 6.4 oz (105.4 kg)  BMI 29.64 kg/m2  Body mass index is 29.64 kg/(m^2).  Physical Exam   Constitutional: Vital signs are normal. He appears well-developed and well-nourished. He is active. No distress.   HENT:   Head: Normocephalic.   Right Ear: Hearing, tympanic membrane, external ear and ear canal normal.   Left Ear: Hearing, tympanic membrane, external ear and ear canal normal.   Nose: Nose normal.   Mouth/Throat: Uvula is midline, oropharynx is clear and moist and mucous membranes are normal.   Eyes: Conjunctivae and lids are normal.   EOM abnormal on right due to previous trauma      Neck: Trachea normal, normal range of motion, full passive range of motion without pain and phonation normal. Neck supple. Carotid bruit is not present. No tracheal deviation present. No thyroid mass and no thyromegaly present.   Cardiovascular: Normal rate, regular rhythm, S1 normal, S2 normal, normal heart sounds, intact distal pulses and normal pulses.  Exam reveals no gallop, no distant heart sounds and no friction rub.    No murmur heard.  Pulses:       Radial pulses are 2+ on the right side, and 2+ on the left side.        Dorsalis pedis pulses are 2+ on the right side, and 2+ on the left side.        Posterior tibial pulses are 2+ on the right side, and 2+ on the left side.   Pulmonary/Chest: Effort normal and breath sounds normal. No accessory muscle usage. No respiratory distress. He has no wheezes. He has no rhonchi. He has no rales.   Abdominal: Soft. Normal appearance and bowel sounds are normal. There is no tenderness.   Neurological: He is alert.   Skin: Skin is warm, dry and intact.   Psychiatric: He has a normal mood and affect. His speech is normal and behavior is normal. Thought content normal.   Diabetic foot exam completed:  Monofilament decreased   DP, TP 2+ bilat  No open wounds or sores bilat       Diagnostic Test Results:  Results for orders placed or performed " in visit on 03/29/18 (from the past 24 hour(s))   Lipid panel reflex to direct LDL Fasting   Result Value Ref Range    Cholesterol 158 <200 mg/dL    Triglycerides 185 (H) <150 mg/dL    HDL Cholesterol 43 >39 mg/dL    LDL Cholesterol Calculated 78 <100 mg/dL    Non HDL Cholesterol 115 <130 mg/dL   TSH with free T4 reflex   Result Value Ref Range    TSH 1.69 0.40 - 4.00 mU/L   Hemoglobin A1c   Result Value Ref Range    Hemoglobin A1C 10.5 (H) 0 - 6.4 %   Basic metabolic panel  (Ca, Cl, CO2, Creat, Gluc, K, Na, BUN)   Result Value Ref Range    Sodium 134 133 - 144 mmol/L    Potassium 4.2 3.4 - 5.3 mmol/L    Chloride 100 94 - 109 mmol/L    Carbon Dioxide 27 20 - 32 mmol/L    Anion Gap 7 3 - 14 mmol/L    Glucose 179 (H) 70 - 99 mg/dL    Urea Nitrogen 18 7 - 30 mg/dL    Creatinine 1.16 0.66 - 1.25 mg/dL    GFR Estimate 64 >60 mL/min/1.7m2    GFR Estimate If Black 77 >60 mL/min/1.7m2    Calcium 9.0 8.5 - 10.1 mg/dL   Albumin Random Urine Quantitative with Creat Ratio   Result Value Ref Range    Creatinine Urine 49 mg/dL    Albumin Urine mg/L 12 mg/L    Albumin Urine mg/g Cr 24.90 (H) 0 - 17 mg/g Cr       ASSESSMENT/PLAN:     1. Type 2 diabetes mellitus with diabetic polyneuropathy, without long-term current use of insulin (H)  Uncontrolled.  Recheck labs today, Hgb A1c- reviewed results with patient.  Discussed options with patient of increasing medications, changing medications, meeting with diabetic educator, starting an injectable medication, starting insulin.   Pt reports that he gets GI upset from medication and does not want to change current medication regiment, pt had no interest in working with a diabetic educator or starting an injectable medication or insulin.   Plan is to stay on Glyburide-metformin 5-500, 2 tablets daily in the evening and try taking a 1/2 tablet with first meal of the day. If patient tolerating 1/2 tablet with first meal of the day then will try to increase to 1 tablet.   Pt reports that he  will try to cut out more carbohydrates from his meals.  Return to clinic in 3 months for recheck of hgb A1c.   - Albumin Random Urine Quantitative with Creat Ratio  - Lipid panel reflex to direct LDL Fasting  - TSH with free T4 reflex  - Hemoglobin A1c  - Basic metabolic panel  (Ca, Cl, CO2, Creat, Gluc, K, Na, BUN)  - glyBURIDE-metFORMIN (GLUCOVANCE) 5-500 MG per tablet; Take 1/2-1 tab daily then 2 tabs in the PM  Dispense: 270 tablet; Refill: 0  - FOOT EXAM  Reviewed with Juan Carlos my concerns regarding uncontrolled diabetes and long-term complications.  States, we will have to die of something.    2. Hyperlipidemia LDL goal <100  Labs rechecked today.   Continue with current medication regiment.   - Albumin Random Urine Quantitative with Creat Ratio  - Lipid panel reflex to direct LDL Fasting  - TSH with free T4 reflex  - Hemoglobin A1c  - Basic metabolic panel  (Ca, Cl, CO2, Creat, Gluc, K, Na, BUN)    3. Hypertension goal BP (blood pressure) < 140/80  Labs rechecked today.   Continue with current medication regiment.   - Albumin Random Urine Quantitative with Creat Ratio  - Lipid panel reflex to direct LDL Fasting  - TSH with free T4 reflex  - Hemoglobin A1c  - Basic metabolic panel  (Ca, Cl, CO2, Creat, Gluc, K, Na, BUN)  - lisinopril (PRINIVIL/ZESTRIL) 20 MG tablet; Take 1 tablet (20 mg) by mouth daily (Needs follow-up appointment for this medication)  Dispense: 90 tablet; Refill: 0    4. Encounter for diabetic foot exam (H)  Annual. Completed today.   Recheck in 1 year.     Refuses colonoscopy a fit test.  Declines wanting to have PSA checked.  Declines influenza vaccine.  Declines pneumonia vaccine.    During this visit greater than 80% of the 43 minutes for this appointment was spent in counseling and/or coordinating care of above stated issues.     Nelida Long RN [employer] Student Nurse Practitioner     I agree with the PFSH and ROS as completed by the NP student. The remainder of the encounter was performed  by me and scribed the NP student. The scribed note accurately reflects my personal services and the decisions made by me. LATHA Ellis, NP-C    LATHA Machuca [facility]

## 2018-03-29 NOTE — MR AVS SNAPSHOT
"              After Visit Summary   3/29/2018    Juan Carlos Rizo    MRN: [id number]           Patient Information     Date Of Birth          1955        Visit Information        Provider Department      3/29/2018 2:00 PM Shira Denson APRN [facility]        Today's Diagnoses     Type 2 diabetes mellitus with diabetic polyneuropathy, without long-term current use of insulin (H)    -  1    Hyperlipidemia LDL goal <100        Hypertension goal BP (blood pressure) < 140/80          Care Instructions    Plan to follow up in 3 months or sooner if needed           Follow-ups after your visit        Who to contact     Normal or non-critical lab and imaging results will be communicated to you by Grubsterhart, letter or phone within 4 business days after the clinic has received the results. If you do not hear from us within 7 days, please contact the clinic through Grubsterhart or phone. If you have a critical or abnormal lab result, we will notify you by phone as soon as possible.  Submit refill requests through appiris or call your pharmacy and they will forward the refill request to us. Please allow 3 business days for your refill to be completed.          If you need to speak with a  for additional information , please call: [phone]           Additional Information About Your Visit        GrubsterharTipHive Information     appiris lets you send messages to your doctor, view your test results, renew your prescriptions, schedule appointments and more. To sign up, go to [email or web address] . Click on \"Log in\" on the left side of the screen, which will take you to the Welcome page. Then click on \"Sign up Now\" on the right side of the page.     You will be asked to enter the access code listed below, as well as some personal information. Please follow the directions to create your username and password.     Your access code is: [email or web address]  Expires: 6/27/2018  2:50 PM     Your access " "code will  in 90 days. If you need help or a new code, please call your [place] clinic or [phone].        Care EveryWhere ID     This is your Care EveryWhere ID. This could be used by other organizations to access your [place] medical records  [id number]        Your Vitals Were     Pulse Temperature Height BMI (Body Mass Index)          72 98.2  F (36.8  C) (Tympanic) 6' 2.25\" (1.886 m) 29.64 kg/m2         Blood Pressure from Last 3 Encounters:   18 140/76   10/04/16 (!) 148/92   16 120/80    Weight from Last 3 Encounters:   18 232 lb 6.4 oz (105.4 kg)   16 225 lb 3.2 oz (102.2 kg)   12/08/15 232 lb (105.2 kg)              We Performed the Following     Albumin Random Urine Quantitative with Creat Ratio     Basic metabolic panel  (Ca, Cl, CO2, Creat, Gluc, K, Na, BUN)     Hemoglobin A1c     Lipid panel reflex to direct LDL Fasting     TSH with free T4 reflex          Today's Medication Changes          These changes are accurate as of 3/29/18  2:50 PM.  If you have any questions, ask your nurse or doctor.               These medicines have changed or have updated prescriptions.        Dose/Directions    glyBURIDE-metFORMIN 5-500 MG per tablet   Commonly known as:  GLUCOVANCE   This may have changed:    - how much to take  - how to take this  - when to take this  - additional instructions   Used for:  Type 2 diabetes mellitus with diabetic polyneuropathy, without long-term current use of insulin (H)   Changed by:  Shira Denson APRN CNP        Take 1/2-1 tab daily then 2 tabs in the PM   Quantity:  270 tablet   Refills:  0         Stop taking these medicines if you haven't already. Please contact your care team if you have questions.     glimepiride 4 MG tablet   Commonly known as:  AMARYL   Stopped by:  Shira Denson APRN CNP           metFORMIN 1000 MG tablet   Commonly known as:  GLUCOPHAGE   Stopped by:  Shira Denson APRN CNP              "   Where to get your medicines      These medications [place] [facility] [place] [address]  [address], [facility] [address]     Phone:  [phone]     glyBURIDE-metFORMIN 5-500 MG per tablet    lisinopril 20 MG tablet                Primary Care Provider Office Phone # Fax #    Marlena Rupesh Saldivar [phone] [address]   [place] [facility] [address]        Equal Access to Services     AUNG MERCADO : Hadii aad ku hadasho Soomaali, waaxda luqadaha, qaybta kaalmada adeegyada, waxay idiin hayaan adeeg kharash la'richard burger. So [facility] [phone].    ATENCIÓN: Si habla español, tiene a chatman disposición servicios gratuitos de asistencia lingüística. [facility] [phone].    We comply with applicable federal civil rights laws and [place] laws. We do not discriminate on the basis of race, color, national origin, age, disability, sex, sexual orientation, or gender identity.            Thank you!     Thank you for choosing [facility]  for your care. Our goal is always to provide you with excellent care. Hearing back from our patients is one way we can continue to improve our services. Please take a few minutes to complete the written survey that you may receive in the mail after your visit with us. Thank you!             Your Updated Medication List - Protect others around you: Learn how to safely use, store and throw away your medicines at www.disposemymeds.org.          This list is accurate as of 3/29/18  2:50 PM.  Always use your most recent med list.                   Brand Name Dispense Instructions for use Diagnosis    aspirin 81 MG tablet      Take 1 tablet by mouth daily.    Type II or unspecified type diabetes mellitus without mention of complication, not stated as uncontrolled       BLOOD GLUCOSE TEST STRIPS STRP     1 bottle    PER PATIENT HEALTH PLAN    Type II or unspecified type diabetes mellitus without mention of complication, not  stated as uncontrolled       glyBURIDE-metFORMIN 5-500 MG per tablet    GLUCOVANCE    270 tablet    Take 1/2-1 tab daily then 2 tabs in the PM    Type 2 diabetes mellitus with diabetic polyneuropathy, without long-term current use of insulin (H)       lisinopril 20 MG tablet    PRINIVIL/ZESTRIL    90 tablet    Take 1 tablet (20 mg) by mouth daily (Needs follow-up appointment for this medication)    Hypertension goal BP (blood pressure) < 140/80       simvastatin 40 MG tablet    ZOCOR    90 tablet    TAKE ONE TABLET BY MOUTH AT BEDTIME    Hyperlipidemia LDL goal <100

## 2018-03-30 VITALS
SYSTOLIC BLOOD PRESSURE: 136 MMHG | BODY MASS INDEX: 29.82 KG/M2 | HEIGHT: 74 IN | DIASTOLIC BLOOD PRESSURE: 74 MMHG | WEIGHT: 232.4 LBS | HEART RATE: 72 BPM | TEMPERATURE: 98.2 F

## 2018-03-30 LAB
ANION GAP SERPL CALCULATED.3IONS-SCNC: 7 MMOL/L (ref 3–14)
BUN SERPL-MCNC: 18 MG/DL (ref 7–30)
CALCIUM SERPL-MCNC: 9 MG/DL (ref 8.5–10.1)
CHLORIDE SERPL-SCNC: 100 MMOL/L (ref 94–109)
CHOLEST SERPL-MCNC: 158 MG/DL
CO2 SERPL-SCNC: 27 MMOL/L (ref 20–32)
CREAT SERPL-MCNC: 1.16 MG/DL (ref 0.66–1.25)
CREAT UR-MCNC: 49 MG/DL
GFR SERPL CREATININE-BSD FRML MDRD: 64 ML/MIN/1.7M2
GLUCOSE SERPL-MCNC: 179 MG/DL (ref 70–99)
HDLC SERPL-MCNC: 43 MG/DL
LDLC SERPL CALC-MCNC: 78 MG/DL
MICROALBUMIN UR-MCNC: 12 MG/L
MICROALBUMIN/CREAT UR: 24.9 MG/G CR (ref 0–17)
NONHDLC SERPL-MCNC: 115 MG/DL
POTASSIUM SERPL-SCNC: 4.2 MMOL/L (ref 3.4–5.3)
SODIUM SERPL-SCNC: 134 MMOL/L (ref 133–144)
TRIGL SERPL-MCNC: 185 MG/DL
TSH SERPL DL<=0.005 MIU/L-ACNC: 1.69 MU/L (ref 0.4–4)

## 2018-03-30 ASSESSMENT — ENCOUNTER SYMPTOMS
JOINT SWELLING: 0
COUGH: 0
LIGHT-HEADEDNESS: 0
DIZZINESS: 0
SORE THROAT: 0
DYSURIA: 0
SINUS PRESSURE: 0
HEADACHES: 0
RHINORRHEA: 0
FATIGUE: 0
FREQUENCY: 0
ARTHRALGIAS: 0

## 2018-04-02 ENCOUNTER — TELEPHONE (OUTPATIENT)
Dept: FAMILY MEDICINE | Facility: CLINIC | Age: 63
End: 2018-04-02

## 2018-04-02 NOTE — TELEPHONE ENCOUNTER
Prior Authorization Retail Medication Request    Medication/Dose: prior auth for glyburide.metformin  ICD code (if different than what is on RX):    Previously Tried and Failed:   Rationale:      Insurance Name:  [employer]  Insurance ID:  [id number]            Elizabeth Montero [facility]   Certified Pharmacy Technician

## 2018-04-05 NOTE — TELEPHONE ENCOUNTER
PA Initiation    Medication: Glucovance  Insurance Company: [employer] - Phone [phone] Fax [phone]  Pharmacy Filling the Rx: [facility] [place], [place] - [address]  Filling Pharmacy Phone: [phone]  Filling Pharmacy Fax:    Start Date: 4/5/2018    THIS HAS BEEN SUBMITTED BY THE PRIOR-AUTHORIZATION TEAM. ANY QUESTIONS PLEASE CALL [phone]. THANK YOU

## 2018-04-06 NOTE — TELEPHONE ENCOUNTER
Prior Authorization Approval    Authorization Effective Date: 3/5/2018  Authorization Expiration Date: 4/5/2019  Medication: Glucovance APPROVED   Approved Dose/Quantity:   Reference #: [id number]   Insurance Company: [employer] - Phone [phone] Fax [phone]  Expected CoPay: $4.01     CoPay Card Available:      Foundation Assistance Needed:    Which Pharmacy is filling the prescription (Not needed for infusion/clinic administered): [facility] [place], [place] - [address]  Pharmacy Notified: Yes  Patient Notified: Yes

## 2018-06-06 ENCOUNTER — TELEPHONE (OUTPATIENT)
Dept: FAMILY MEDICINE | Facility: CLINIC | Age: 63
End: 2018-06-06

## 2018-06-06 NOTE — TELEPHONE ENCOUNTER
Panel Management Review      Patient has the following on his problem list:     Diabetes    ASA: Passed    Last A1C  Lab Results   Component Value Date    A1C 10.5 03/29/2018    A1C 9.4 05/08/2017    A1C 7.2 08/09/2016    A1C 8.6 04/04/2016    A1C 8.4 12/08/2015     A1C tested: FAILED    Last LDL:    Lab Results   Component Value Date    CHOL 158 03/29/2018     Lab Results   Component Value Date    HDL 43 03/29/2018     Lab Results   Component Value Date    LDL 78 03/29/2018     Lab Results   Component Value Date    TRIG 185 03/29/2018     Lab Results   Component Value Date    CHOLHDLRATIO 4.0 06/01/2012     Lab Results   Component Value Date    NHDL 115 03/29/2018       Is the patient on a Statin? YES             Is the patient on Aspirin? YES    Medications     HMG CoA Reductase Inhibitors    simvastatin (ZOCOR) 40 MG tablet    Salicylates    aspirin 81 MG tablet          Last three blood pressure readings:  BP Readings from Last 3 Encounters:   03/30/18 136/74   10/04/16 (!) 148/92   08/09/16 120/80       Date of last diabetes office visit: 3/29/18     Tobacco History:     History   Smoking Status     Never Smoker   Smokeless Tobacco     Never Used           IVD   ASA: Passed    Last LDL:    Lab Results   Component Value Date    CHOL 158 03/29/2018     Lab Results   Component Value Date    HDL 43 03/29/2018     Lab Results   Component Value Date    LDL 78 03/29/2018     Lab Results   Component Value Date    TRIG 185 03/29/2018        Lab Results   Component Value Date    CHOLHDLRATIO 4.0 06/01/2012        Is the patient on a Statin? YES   Is the patient on Aspirin? YES                  Medications     HMG CoA Reductase Inhibitors    simvastatin (ZOCOR) 40 MG tablet    Salicylates    aspirin 81 MG tablet          Last three blood pressure readings:  BP Readings from Last 3 Encounters:   03/30/18 136/74   10/04/16 (!) 148/92   08/09/16 120/80        Tobacco History:     History   Smoking Status     Never Smoker    Smokeless Tobacco     Never Used       Hypertension   Last three blood pressure readings:  BP Readings from Last 3 Encounters:   03/30/18 136/74   10/04/16 (!) 148/92   08/09/16 120/80     Blood pressure: Passed    HTN Guidelines:  Age 18-59 BP range:  Less than 140/90  Age 60-85 with Diabetes:  Less than 140/90  Age 60-85 without Diabetes:  less than 150/90      Composite cancer screening  Chart review shows that this patient is due/due soon for the following Colonoscopy/Fecal Colorectal (FIT)  Summary:    Patient is due/failing the following:   Pneumovax 23, HIV screen, colonoscopy/FIT, eye exam, advance directive planning, A1C    Action needed:   Patient needs referral/order: colonoscopy/FIT, needs diabetic follow up appointment as of 6/29/18    Type of outreach:    Sent letter.    Questions for provider review:    None                                                                                                                                    Concepción Decker CMA       Chart routed to none .

## 2018-06-06 NOTE — LETTER
[facility] [address]  [place] [id number] [phone]      June 6, 2018      Juan Carlos Rizo  [address]  [facility] [address]        Dear Juan Carlos,     As part of [facility]'s commitment to health and wellness, we periodically look at how well we are taking care of you when you're not sick. Along with your annual physical exams in our office, routine cancer screening is an important early detection tool that we can use together to keep you healthy for a long time.    Our most recent records indicate that you are due for one or more of the following:    Diabetic follow up appointment as of 6/29/18, pneumonia vaccine, advance directive planning, and colon cancer screening    Please call us at [phone] to schedule your next diabetic appointment on or after 6/29/18. At your diabetic appointment we can administer your pneumonia vaccine and discuss advance directive planning.    -- Colon screen. Colonoscopy or FIT test. One of these tests is recommended at age 50 to screen for colon cancer.  If you have had a colon screen outside of [facility] please call us to let us know so we can update your chart.    Please call one of the following numbers to schedule a colonoscopy:  [facility] [phone]  [facility] [phone]  [facility] [phone]  [facility] [phone] (multiple sites, call for locations)    A colonoscopy is the gold standard but if you prefer to do a screening that is less invasive and less expensive there is a test for you! It is called the FIT (fecal immunochemical testing) test. It is a screening option that is performed on a yearly basis. This is a test to check for blood in the stool. This test can be done at home and returned to the clinic during office hours. If you are willing to do this test, we can order the kit for you to  at our lab.  Remember, it is always better to do something rather than nothing. Please call us  at [phone] if you need an order for a colonoscopy or FIT testing.      While we work hard to maintain accurate records on all our patients, it is always possible that this notice does not accurately reflect a surgery you have had in the past to remove your colon, uterus (hysterectomy) or breast tissue (mastectomy). If we have made this error in your case please accept our apologies. To ensure that we do not continue to send you notices please verify at your next visit that we have accurate dates and locations of your surgical history, even if these were done many years ago.     Again, working together for your health is our goal. If you have any questions or concerns regarding this letter, we'd like to hear from you.    Thank you for choosing [facility] for your healthcare needs.    Sincerely,     [facility]

## 2018-08-15 DIAGNOSIS — E11.42 TYPE 2 DIABETES MELLITUS WITH DIABETIC POLYNEUROPATHY, WITHOUT LONG-TERM CURRENT USE OF INSULIN (H): ICD-10-CM

## 2018-08-15 DIAGNOSIS — E78.5 HYPERLIPIDEMIA LDL GOAL <100: ICD-10-CM

## 2018-08-15 DIAGNOSIS — I10 HYPERTENSION GOAL BP (BLOOD PRESSURE) < 140/80: ICD-10-CM

## 2018-08-15 RX ORDER — GLYBURIDE-METFORMIN HYDROCHLORIDE 5; 500 MG/1; MG/1
TABLET ORAL
Qty: 270 TABLET | Refills: 1 | Status: SHIPPED | OUTPATIENT
Start: 2018-08-15 | End: 2019-02-06

## 2018-08-15 RX ORDER — LISINOPRIL 20 MG/1
20 TABLET ORAL DAILY
Qty: 90 TABLET | Refills: 1 | Status: SHIPPED | OUTPATIENT
Start: 2018-08-15 | End: 2019-02-06

## 2018-08-15 RX ORDER — SIMVASTATIN 40 MG
TABLET ORAL
Qty: 90 TABLET | Refills: 1 | Status: SHIPPED | OUTPATIENT
Start: 2018-08-15 | End: 2019-02-06

## 2018-08-15 NOTE — TELEPHONE ENCOUNTER
LDL Cholesterol Calculated   Date Value Ref Range Status   03/29/2018 78 <100 mg/dL Final     Comment:     Desirable:       <100 mg/dl     Lab Results   Component Value Date    A1C 10.5 03/29/2018    A1C 9.4 05/08/2017    A1C 7.2 08/09/2016    A1C 8.6 04/04/2016    A1C 8.4 12/08/2015     Review of  chart pt in non compliant of OV and labs, will place orders and advise appt   But RX sent   PEd Baca  RN/Calin Silva

## 2018-09-10 ENCOUNTER — TELEPHONE (OUTPATIENT)
Dept: FAMILY MEDICINE | Facility: CLINIC | Age: 63
End: 2018-09-10

## 2018-09-10 NOTE — TELEPHONE ENCOUNTER
Panel Management Review      Patient has the following on his problem list:     Diabetes    ASA: Not Required     Last A1C  Lab Results   Component Value Date    A1C 10.5 03/29/2018    A1C 9.4 05/08/2017    A1C 7.2 08/09/2016    A1C 8.6 04/04/2016    A1C 8.4 12/08/2015     A1C tested: FAILED    Last LDL:    Lab Results   Component Value Date    CHOL 158 03/29/2018     Lab Results   Component Value Date    HDL 43 03/29/2018     Lab Results   Component Value Date    LDL 78 03/29/2018     Lab Results   Component Value Date    TRIG 185 03/29/2018     Lab Results   Component Value Date    CHOLHDLRATIO 4.0 06/01/2012     Lab Results   Component Value Date    NHDL 115 03/29/2018       Is the patient on a Statin? YES             Is the patient on Aspirin? YES    Medications     HMG CoA Reductase Inhibitors    simvastatin (ZOCOR) 40 MG tablet    Salicylates    aspirin 81 MG tablet          Last three blood pressure readings:  BP Readings from Last 3 Encounters:   03/30/18 136/74   10/04/16 (!) 148/92   08/09/16 120/80       Date of last diabetes office visit: 3/29/18     Tobacco History:     History   Smoking Status     Never Smoker   Smokeless Tobacco     Never Used           IVD   ASA: Passed    Last LDL:    Lab Results   Component Value Date    CHOL 158 03/29/2018     Lab Results   Component Value Date    HDL 43 03/29/2018     Lab Results   Component Value Date    LDL 78 03/29/2018     Lab Results   Component Value Date    TRIG 185 03/29/2018        Lab Results   Component Value Date    CHOLHDLRATIO 4.0 06/01/2012        Is the patient on a Statin? YES   Is the patient on Aspirin? YES                  Medications     HMG CoA Reductase Inhibitors    simvastatin (ZOCOR) 40 MG tablet    Salicylates    aspirin 81 MG tablet          Last three blood pressure readings:  BP Readings from Last 3 Encounters:   03/30/18 136/74   10/04/16 (!) 148/92   08/09/16 120/80        Tobacco History:     History   Smoking Status     Never  Smoker   Smokeless Tobacco     Never Used       Hypertension   Last three blood pressure readings:  BP Readings from Last 3 Encounters:   03/30/18 136/74   10/04/16 (!) 148/92   08/09/16 120/80     Blood pressure: Passed    HTN Guidelines:  Age 18-59 BP range:  Less than 140/90  Age 60-85 with Diabetes:  Less than 140/90  Age 60-85 without Diabetes:  less than 150/90      Composite cancer screening  Chart review shows that this patient is due/due soon for the following Colonoscopy  Summary:    Patient is due/failing the following:   Pneumovax, HIV screen, colonoscopy, Eye exam, PHQ2, Advance Directive Planning, A1C, influenza vaccine    Action needed:   Patient needs office visit for diabetic follow up. and Patient needs referral/order: colonoscopy/FIT    Type of outreach:    Phone, spoke to patient.  Appt scheduled 9/19/18    Questions for provider review:    None                                                                                                                                    Concepción Decker CMA       Chart routed to none .

## 2018-12-18 ENCOUNTER — TELEPHONE (OUTPATIENT)
Dept: FAMILY MEDICINE | Facility: CLINIC | Age: 63
End: 2018-12-18

## 2018-12-18 NOTE — TELEPHONE ENCOUNTER
Panel Management Review      Patient has the following on his problem list:     Diabetes    ASA: Passed    Last A1C  Lab Results   Component Value Date    A1C 10.5 03/29/2018    A1C 9.4 05/08/2017    A1C 7.2 08/09/2016    A1C 8.6 04/04/2016    A1C 8.4 12/08/2015     A1C tested: FAILED    Last LDL:    Lab Results   Component Value Date    CHOL 158 03/29/2018     Lab Results   Component Value Date    HDL 43 03/29/2018     Lab Results   Component Value Date    LDL 78 03/29/2018     Lab Results   Component Value Date    TRIG 185 03/29/2018     Lab Results   Component Value Date    CHOLHDLRATIO 4.0 06/01/2012     Lab Results   Component Value Date    NHDL 115 03/29/2018       Is the patient on a Statin? YES             Is the patient on Aspirin? YES    Medications     HMG CoA Reductase Inhibitors    simvastatin (ZOCOR) 40 MG tablet    Salicylates    aspirin 81 MG tablet          Last three blood pressure readings:  BP Readings from Last 3 Encounters:   03/30/18 136/74   10/04/16 (!) 148/92   08/09/16 120/80       Date of last diabetes office visit: 3/29/18     Tobacco History:     History   Smoking Status     Never Smoker   Smokeless Tobacco     Never Used           IVD   ASA: Passed    Last LDL:    Lab Results   Component Value Date    CHOL 158 03/29/2018     Lab Results   Component Value Date    HDL 43 03/29/2018     Lab Results   Component Value Date    LDL 78 03/29/2018     Lab Results   Component Value Date    TRIG 185 03/29/2018        Lab Results   Component Value Date    CHOLHDLRATIO 4.0 06/01/2012        Is the patient on a Statin? YES   Is the patient on Aspirin? YES                  Medications     HMG CoA Reductase Inhibitors    simvastatin (ZOCOR) 40 MG tablet    Salicylates    aspirin 81 MG tablet          Last three blood pressure readings:  BP Readings from Last 3 Encounters:   03/30/18 136/74   10/04/16 (!) 148/92   08/09/16 120/80        Tobacco History:     History   Smoking Status     Never Smoker    Smokeless Tobacco     Never Used       Hypertension   Last three blood pressure readings:  BP Readings from Last 3 Encounters:   03/30/18 136/74   10/04/16 (!) 148/92   08/09/16 120/80     Blood pressure: Passed    HTN Guidelines:  Age 18-59 BP range:  Less than 140/90  Age 60-85 with Diabetes:  Less than 140/90  Age 60-85 without Diabetes:  less than 150/90      Composite cancer screening  Chart review shows that this patient is due/due soon for the following Colonoscopy/Fecal Colorectal (FIT)  Summary:    Patient is due/failing the following:   HIV, Colon, Zoster, Eye, PHQ2, Advance Directive Planning, A1C, Influenza    Action needed:   Patient needs office visit for diabetic check. Needs referral/order: colonoscopy/FIT    Type of outreach:    Phone, spoke to patient.  Declines coming in for appointment. He will call us when he decides he wants to be seen.    Questions for provider review:    None                                                                                                                                    Concepción Decker CMA       Chart routed to none .

## 2019-02-06 DIAGNOSIS — I10 HYPERTENSION GOAL BP (BLOOD PRESSURE) < 140/80: ICD-10-CM

## 2019-02-06 DIAGNOSIS — E78.5 HYPERLIPIDEMIA LDL GOAL <100: ICD-10-CM

## 2019-02-06 DIAGNOSIS — E11.42 TYPE 2 DIABETES MELLITUS WITH DIABETIC POLYNEUROPATHY, WITHOUT LONG-TERM CURRENT USE OF INSULIN (H): ICD-10-CM

## 2019-02-06 RX ORDER — SIMVASTATIN 40 MG
TABLET ORAL
Qty: 90 TABLET | Refills: 0 | Status: SHIPPED | OUTPATIENT
Start: 2019-02-06 | End: 2019-05-09

## 2019-02-06 RX ORDER — LISINOPRIL 20 MG/1
TABLET ORAL
Qty: 90 TABLET | Refills: 0 | Status: SHIPPED | OUTPATIENT
Start: 2019-02-06 | End: 2019-05-09

## 2019-02-06 RX ORDER — GLYBURIDE-METFORMIN HYDROCHLORIDE 5; 500 MG/1; MG/1
TABLET ORAL
Qty: 90 TABLET | Refills: 1 | Status: SHIPPED | OUTPATIENT
Start: 2019-02-06 | End: 2019-04-04

## 2019-02-06 NOTE — TELEPHONE ENCOUNTER
Kirk pt phone for OV   Medication is being filled for 1 time refill only due to:   Over due for office visit and/or labs   ELÓN Baca  RN/Calin Silva

## 2019-04-04 DIAGNOSIS — E11.42 TYPE 2 DIABETES MELLITUS WITH DIABETIC POLYNEUROPATHY, WITHOUT LONG-TERM CURRENT USE OF INSULIN (H): ICD-10-CM

## 2019-04-04 RX ORDER — GLYBURIDE-METFORMIN HYDROCHLORIDE 5; 500 MG/1; MG/1
TABLET ORAL
Qty: 75 TABLET | Refills: 0 | Status: SHIPPED | OUTPATIENT
Start: 2019-04-04 | End: 2019-05-09

## 2019-04-04 NOTE — TELEPHONE ENCOUNTER
Routing refill request to provider for review/approval because: Melvi already given x1. Labs not current. Patient needs to be seen because it has been more than 1 year since last office visit.    Letter mailed to pt today.    Nelda WYNNE RN

## 2019-04-04 NOTE — TELEPHONE ENCOUNTER
"Requested Prescriptions   Pending Prescriptions Disp Refills     glyBURIDE-metFORMIN (GLUCOVANCE) 5-500 MG tablet [Pharmacy Med Name: GLYBURIDE-METFORMIN 5-500MG TABS] 90 tablet 1    Last Written Prescription Date:  02/06/2019 #90 x 1  Last filled 03/07/2019  Last office visit: 3/29/2018 GLADYS Denson   Future Office Visit: None     Sig: TAKE ONE-HALF TO ONE TABLET BY MOUTH DAILY AND TAKE TWO TABLETS BY MOUTH EVERY EVENING (NEED TO BE SEEN IN CLINIC FOR FURTHER REFILLS)    Combination Oral Antihyperglycemic Agents Failed - 4/4/2019 10:17 AM       Failed - Blood pressure under 140/90 in past 12 months    BP Readings from Last 3 Encounters:   03/30/18 136/74   10/04/16 (!) 148/92   08/09/16 120/80                Failed - Patient has a documented LDL level within past 12 mos.    Recent Labs   Lab Test 03/29/18  1401   LDL 78            Failed - Patient has documented A1c within the specified period of time.    If HgbA1C is 8 or greater, it needs to be on file within the past 3 months.  If less than 8, must be on file within the past 6 months.     Recent Labs   Lab Test 03/29/18  1401   A1C 10.5*            Failed - Patient's CR is NOT>1.4 OR Patient's EGFR is NOT<45 within past 12 mos.    Recent Labs   Lab Test 03/29/18  1401   GFRESTIMATED 64   GFRESTBLACK 77       Recent Labs   Lab Test 03/29/18  1401   CR 1.16            Failed - Recent (6 mo) or future (30 days) visit within the authorizing provider's specialty    Patient had office visit in the last 6 months or has a visit in the next 30 days with authorizing provider or within the authorizing provider's specialty.  See \"Patient Info\" tab in inbasket, or \"Choose Columns\" in Meds & Orders section of the refill encounter.           Passed - Patient has a documented Microalbumin level within past 12 mos.    Recent Labs   Lab Test 03/29/18  1402   MICROL 12   UMALCR 24.90*            Passed - Patient does not have a diagnosis of CHF.       Passed - Medication is active " on med list       Passed - Patient is 18 years old or older.

## 2019-04-04 NOTE — TELEPHONE ENCOUNTER
ML for pt to callback  To clarify how he is taking medication and to help make an appt with [facility]  RN/Calin Silva

## 2019-05-02 DIAGNOSIS — E78.5 HYPERLIPIDEMIA LDL GOAL <100: ICD-10-CM

## 2019-05-02 DIAGNOSIS — E11.42 TYPE 2 DIABETES MELLITUS WITH DIABETIC POLYNEUROPATHY, WITHOUT LONG-TERM CURRENT USE OF INSULIN (H): ICD-10-CM

## 2019-05-02 DIAGNOSIS — I10 HYPERTENSION GOAL BP (BLOOD PRESSURE) < 140/80: ICD-10-CM

## 2019-05-02 NOTE — TELEPHONE ENCOUNTER
"Requested Prescriptions   Pending Prescriptions Disp Refills     simvastatin (ZOCOR) 40 MG tablet [Pharmacy Med Name: SIMVASTATIN 40MG TABS]  Last Written Prescription Date:  02/06/2019 #90 x 0  Last filled 02/06/2019  Last office visit: 3/29/2018 GLADYS Denson   Future Office Visit:  None   90 tablet 0     Sig: TAKE ONE TABLET BY MOUTH AT BEDTIME (DUE FOR AN APPOINTMENT FOLLOW UP ON DIABETES)       Statins Protocol Failed - 5/2/2019 10:21 AM        Failed - LDL on file in past 12 months     Recent Labs   Lab Test 03/29/18  1401   LDL 78             Failed - Recent (12 mo) or future (30 days) visit within the authorizing provider's specialty     Patient had office visit in the last 12 months or has a visit in the next 30 days with authorizing provider or within the authorizing provider's specialty.  See \"Patient Info\" tab in inbasket, or \"Choose Columns\" in Meds & Orders section of the refill encounter.              Passed - No abnormal creatine kinase in past 12 months     No lab results found.             Passed - Medication is active on med list        Passed - Patient is age 18 or older        lisinopril (PRINIVIL/ZESTRIL) 20 MG tablet [Pharmacy Med Name: LISINOPRIL 20MG TABS]  Last Written Prescription Date:  02/06/2019 #90 x 0  Last filled 02/06/2019  Last office visit: 3/29/2018 GLADYS Denson   Future Office Visit:  None   90 tablet 0     Sig: TAKE ONE TABLET BY MOUTH ONCE DAILY (NEED TO BE SEEN IN CLINIC FOR FURTHER REFILLS)       ACE Inhibitors (Including Combos) Protocol Failed - 5/2/2019 10:21 AM        Failed - Blood pressure under 140/90 in past 12 months     BP Readings from Last 3 Encounters:   03/30/18 136/74   10/04/16 (!) 148/92   08/09/16 120/80                 Failed - Recent (12 mo) or future (30 days) visit within the authorizing provider's specialty     Patient had office visit in the last 12 months or has a visit in the next 30 days with authorizing provider or within the authorizing provider's " "specialty.  See \"Patient Info\" tab in inbasket, or \"Choose Columns\" in Meds & Orders section of the refill encounter.              Failed - Normal serum creatinine on file in past 12 months     Recent Labs   Lab Test 03/29/18  1401   CR 1.16             Failed - Normal serum potassium on file in past 12 months     Recent Labs   Lab Test 03/29/18  1401   POTASSIUM 4.2             Passed - Medication is active on med list        Passed - Patient is age 18 or older        glyBURIDE-metFORMIN (GLUCOVANCE) 5-500 MG tablet [Pharmacy Med Name: GLYBURIDE-METFORMIN 5-500MG TABS]  Last Written Prescription Date:  04/04/2019 #75 x 0  Last filled 04/04/2019  Last office visit: 3/29/2018 GLADYS Denson   Future Office Visit:  None   75 tablet 0     Sig: TAKE 1/2 TO 1 TABLET BY MOUTH DAILY AND 2 TABLETS EVERY EVENING ( NEED TO BE SEEN IN CLINIC FOR FURTHER REFILLS)       Combination Oral Antihyperglycemic Agents Failed - 5/2/2019 10:21 AM        Failed - Blood pressure under 140/90 in past 12 months     BP Readings from Last 3 Encounters:   03/30/18 136/74   10/04/16 (!) 148/92   08/09/16 120/80                 Failed - Patient has a documented LDL level within past 12 mos.     Recent Labs   Lab Test 03/29/18  1401   LDL 78             Failed - Patient has documented A1c within the specified period of time.     If HgbA1C is 8 or greater, it needs to be on file within the past 3 months.  If less than 8, must be on file within the past 6 months.     Recent Labs   Lab Test 03/29/18  1401   A1C 10.5*             Failed - Patient's CR is NOT>1.4 OR Patient's EGFR is NOT<45 within past 12 mos.     Recent Labs   Lab Test 03/29/18  1401   GFRESTIMATED 64   GFRESTBLACK 77       Recent Labs   Lab Test 03/29/18  1401   CR 1.16             Failed - Recent (6 mo) or future (30 days) visit within the authorizing provider's specialty     Patient had office visit in the last 6 months or has a visit in the next 30 days with authorizing provider or " "within the authorizing provider's specialty.  See \"Patient Info\" tab in inbasket, or \"Choose Columns\" in Meds & Orders section of the refill encounter.            Passed - Patient has a documented Microalbumin level within past 12 mos.     Recent Labs   Lab Test 03/29/18  1402   MICROL 12   UMALCR 24.90*             Passed - Patient does not have a diagnosis of CHF.        Passed - Medication is active on med list        Passed - Patient is 18 years old or older.          "

## 2019-05-02 NOTE — TELEPHONE ENCOUNTER
ML for pt to callback   Needs APPT NOW MAY 2019  Will only be able to fill med till appt as pt is over due and no showed   Refill to provider   LEÓN Baca  RN/Calin Silva

## 2019-05-03 RX ORDER — LISINOPRIL 20 MG/1
TABLET ORAL
Qty: 90 TABLET | Refills: 0 | OUTPATIENT
Start: 2019-05-03

## 2019-05-03 RX ORDER — SIMVASTATIN 40 MG
TABLET ORAL
Qty: 90 TABLET | Refills: 0 | OUTPATIENT
Start: 2019-05-03

## 2019-05-03 RX ORDER — GLYBURIDE-METFORMIN HYDROCHLORIDE 5; 500 MG/1; MG/1
TABLET ORAL
Qty: 75 TABLET | Refills: 0 | OUTPATIENT
Start: 2019-05-03

## 2019-05-03 NOTE — TELEPHONE ENCOUNTER
Discussed with patient for 15 min why he should come in for yearly exam.  Patient states that nothing ever changes from year to year and he has a high deductible plan.  Advised patient best to be seen yearly and it is [facility] policy. Patient agrees to come in for appointment.    Becky Rowell RN

## 2019-05-09 ENCOUNTER — OFFICE VISIT (OUTPATIENT)
Dept: FAMILY MEDICINE | Facility: CLINIC | Age: 64
End: 2019-05-09
Payer: COMMERCIAL

## 2019-05-09 VITALS
DIASTOLIC BLOOD PRESSURE: 84 MMHG | WEIGHT: 231.6 LBS | RESPIRATION RATE: 16 BRPM | HEIGHT: 74 IN | TEMPERATURE: 97.4 F | BODY MASS INDEX: 29.72 KG/M2 | HEART RATE: 68 BPM | SYSTOLIC BLOOD PRESSURE: 130 MMHG

## 2019-05-09 DIAGNOSIS — E11.42 TYPE 2 DIABETES MELLITUS WITH DIABETIC POLYNEUROPATHY, WITHOUT LONG-TERM CURRENT USE OF INSULIN (H): ICD-10-CM

## 2019-05-09 DIAGNOSIS — E78.5 HYPERLIPIDEMIA LDL GOAL <100: ICD-10-CM

## 2019-05-09 DIAGNOSIS — G60.9 HEREDITARY AND IDIOPATHIC PERIPHERAL NEUROPATHY: Primary | ICD-10-CM

## 2019-05-09 DIAGNOSIS — I10 HYPERTENSION GOAL BP (BLOOD PRESSURE) < 140/80: ICD-10-CM

## 2019-05-09 LAB
ALBUMIN SERPL-MCNC: 4.1 G/DL (ref 3.4–5)
ALP SERPL-CCNC: 69 U/L (ref 40–150)
ALT SERPL W P-5'-P-CCNC: 36 U/L (ref 0–70)
ANION GAP SERPL CALCULATED.3IONS-SCNC: 3 MMOL/L (ref 3–14)
AST SERPL W P-5'-P-CCNC: 26 U/L (ref 0–45)
BASOPHILS # BLD AUTO: 0 10E9/L (ref 0–0.2)
BASOPHILS NFR BLD AUTO: 0.5 %
BILIRUB SERPL-MCNC: 0.5 MG/DL (ref 0.2–1.3)
BUN SERPL-MCNC: 24 MG/DL (ref 7–30)
CALCIUM SERPL-MCNC: 9.1 MG/DL (ref 8.5–10.1)
CHLORIDE SERPL-SCNC: 104 MMOL/L (ref 94–109)
CHOLEST SERPL-MCNC: 148 MG/DL
CO2 SERPL-SCNC: 26 MMOL/L (ref 20–32)
CREAT SERPL-MCNC: 1.35 MG/DL (ref 0.66–1.25)
CREAT UR-MCNC: 57 MG/DL
DIFFERENTIAL METHOD BLD: NORMAL
EOSINOPHIL # BLD AUTO: 0.2 10E9/L (ref 0–0.7)
EOSINOPHIL NFR BLD AUTO: 3.3 %
ERYTHROCYTE [DISTWIDTH] IN BLOOD BY AUTOMATED COUNT: 12.8 % (ref 10–15)
GFR SERPL CREATININE-BSD FRML MDRD: 55 ML/MIN/{1.73_M2}
GLUCOSE SERPL-MCNC: 200 MG/DL (ref 70–99)
HBA1C MFR BLD: 9.8 % (ref 0–5.6)
HCT VFR BLD AUTO: 40.1 % (ref 40–53)
HDLC SERPL-MCNC: 42 MG/DL
HGB BLD-MCNC: 13.5 G/DL (ref 13.3–17.7)
LDLC SERPL CALC-MCNC: 67 MG/DL
LYMPHOCYTES # BLD AUTO: 1.3 10E9/L (ref 0.8–5.3)
LYMPHOCYTES NFR BLD AUTO: 21.7 %
MCH RBC QN AUTO: 28.4 PG (ref 26.5–33)
MCHC RBC AUTO-ENTMCNC: 33.7 G/DL (ref 31.5–36.5)
MCV RBC AUTO: 84 FL (ref 78–100)
MICROALBUMIN UR-MCNC: 9 MG/L
MICROALBUMIN/CREAT UR: 15.5 MG/G CR (ref 0–17)
MONOCYTES # BLD AUTO: 0.5 10E9/L (ref 0–1.3)
MONOCYTES NFR BLD AUTO: 9 %
NEUTROPHILS # BLD AUTO: 3.8 10E9/L (ref 1.6–8.3)
NEUTROPHILS NFR BLD AUTO: 65.5 %
NONHDLC SERPL-MCNC: 106 MG/DL
PLATELET # BLD AUTO: 178 10E9/L (ref 150–450)
POTASSIUM SERPL-SCNC: 4.5 MMOL/L (ref 3.4–5.3)
PROT SERPL-MCNC: 7.2 G/DL (ref 6.8–8.8)
RBC # BLD AUTO: 4.76 10E12/L (ref 4.4–5.9)
SODIUM SERPL-SCNC: 133 MMOL/L (ref 133–144)
TRIGL SERPL-MCNC: 196 MG/DL
WBC # BLD AUTO: 5.8 10E9/L (ref 4–11)

## 2019-05-09 PROCEDURE — 83036 HEMOGLOBIN GLYCOSYLATED A1C: CPT | Performed by: NURSE PRACTITIONER

## 2019-05-09 PROCEDURE — 85025 COMPLETE CBC W/AUTO DIFF WBC: CPT | Performed by: NURSE PRACTITIONER

## 2019-05-09 PROCEDURE — 99214 OFFICE O/P EST MOD 30 MIN: CPT | Performed by: NURSE PRACTITIONER

## 2019-05-09 PROCEDURE — 80061 LIPID PANEL: CPT | Performed by: NURSE PRACTITIONER

## 2019-05-09 PROCEDURE — 36415 COLL VENOUS BLD VENIPUNCTURE: CPT | Performed by: NURSE PRACTITIONER

## 2019-05-09 PROCEDURE — 82043 UR ALBUMIN QUANTITATIVE: CPT | Performed by: NURSE PRACTITIONER

## 2019-05-09 PROCEDURE — 80053 COMPREHEN METABOLIC PANEL: CPT | Performed by: NURSE PRACTITIONER

## 2019-05-09 RX ORDER — SIMVASTATIN 40 MG
40 TABLET ORAL AT BEDTIME
Qty: 90 TABLET | Refills: 3 | Status: SHIPPED | OUTPATIENT
Start: 2019-05-09 | End: 2020-04-27

## 2019-05-09 RX ORDER — LISINOPRIL 20 MG/1
20 TABLET ORAL DAILY
Qty: 90 TABLET | Refills: 3 | Status: SHIPPED | OUTPATIENT
Start: 2019-05-09 | End: 2020-04-27

## 2019-05-09 RX ORDER — GLYBURIDE-METFORMIN HYDROCHLORIDE 5; 500 MG/1; MG/1
3 TABLET ORAL DAILY
Qty: 270 TABLET | Refills: 1 | Status: SHIPPED | OUTPATIENT
Start: 2019-05-09 | End: 2019-11-06

## 2019-05-09 ASSESSMENT — ENCOUNTER SYMPTOMS
SHORTNESS OF BREATH: 0
SLEEP DISTURBANCE: 0
HEADACHES: 0
JOINT SWELLING: 0
SORE THROAT: 0
NUMBNESS: 0
FREQUENCY: 0
DIARRHEA: 0
DIZZINESS: 0
FATIGUE: 0
DYSPHORIC MOOD: 0
SINUS PRESSURE: 0
COUGH: 0
LIGHT-HEADEDNESS: 0
NERVOUS/ANXIOUS: 0
RHINORRHEA: 0
WHEEZING: 0
ABDOMINAL PAIN: 0
PALPITATIONS: 0
DYSURIA: 0
ARTHRALGIAS: 0
CONSTIPATION: 0

## 2019-05-09 ASSESSMENT — MIFFLIN-ST. JEOR: SCORE: 1911.31

## 2019-05-09 ASSESSMENT — PAIN SCALES - GENERAL: PAINLEVEL: NO PAIN (0)

## 2019-05-09 NOTE — PROGRESS NOTES
SUBJECTIVE:   Juan Carlos Rizo is a 64 year old male who presents to clinic today for the following   health issues:    Chief Complaint   Patient presents with     Recheck Medication     pt is not fasting       Diabetes Follow-up      Patient is checking blood sugars: not at all    Diabetic concerns: None     Symptoms of hypoglycemia (low blood sugar): shaky, dizzy if he hasn't eaten enough     Paresthesias (numbness or burning in feet) or sores: No     Date of last diabetic eye exam: unsure    Diabetes Management Resources    Hyperlipidemia Follow-Up      Rate your low fat/cholesterol diet?: not monitoring fat    Taking statin?  Yes, no muscle aches from statin    Other lipid medications/supplements?:  none    Hypertension Follow-up      Outpatient blood pressures are being checked at home.  Results are 120/70.    Low Salt Diet: not monitoring salt    BP Readings from Last 2 Encounters:   05/09/19 130/84   03/30/18 136/74     Hemoglobin A1C (%)   Date Value   05/09/2019 9.8 (H)   03/29/2018 10.5 (H)     LDL Cholesterol Calculated (mg/dL)   Date Value   03/29/2018 78   06/01/2012 87     LDL Cholesterol Direct (mg/dL)   Date Value   12/08/2015 67       Amount of exercise or physical activity: 6-7 days/week for an average of greater than 60 minutes    Problems taking medications regularly: No    Medication side effects: none    Diet: regular (no restrictions)        Additional history: as documented    Reviewed  and updated as needed this visit by clinical staff  Tobacco  Allergies  Meds  Problems  Med Hx  Surg Hx  Fam Hx  Soc Hx          Reviewed and updated as needed this visit by Provider  Problems         Current Outpatient Medications   Medication Sig Dispense Refill     aspirin 81 MG tablet Take 1 tablet by mouth daily.       glyBURIDE-metFORMIN (GLUCOVANCE) 5-500 MG tablet Take 3 tablets by mouth daily 270 tablet 1     lisinopril (PRINIVIL/ZESTRIL) 20 MG tablet Take 1 tablet (20 mg) by mouth daily 90  tablet 3     simvastatin (ZOCOR) 40 MG tablet Take 1 tablet (40 mg) by mouth At Bedtime 90 tablet 3     BLOOD GLUCOSE TEST STRIPS STRP PER PATIENT HEALTH PLAN (Patient not taking: No sig reported) 1 bottle rpn     Allergies   Allergen Reactions     Anesthetic [Amides] Nausea and Vomiting     Pt. Denies allergy to local anesthetics.  Had an episode of n/v with prior GA in 1981.  Pt. Has had prior amide local anesthetics without problems.       Here today for recheck of diabetes.  Has been taking Glucovance.  Was to take 4 tablets but was not able to do that because could feel blood sugars going low.  Does not want to take any additional medication or injections.  Has been taking all other medications and tolerating them well without any side effects however, hates having to take medications.  Denies any chest pain, shortness of breath.  Denies numbness or tingling to feet.  Declines wanting to be tested for HIV, declines fit test and colonoscopy.  Declines influenza vaccine or shingles vaccine.  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  Was to follow-up every 4 months for this and has never followed up.  Reports will never for follow-up.  States-takes too long to die.  No thoughts of harming self or others.    ROS:  Review of Systems   Constitutional: Negative for fatigue.   HENT: Negative for congestion, ear pain, rhinorrhea, sinus pressure and sore throat.    Respiratory: Negative for cough, shortness of breath and wheezing.    Cardiovascular: Negative for chest pain, palpitations and leg swelling.   Gastrointestinal: Negative for abdominal pain, constipation and diarrhea.   Genitourinary: Negative for dysuria and frequency.   Musculoskeletal: Negative for arthralgias and joint swelling.   Skin: Negative for rash.   Neurological: Negative for dizziness, light-headedness, numbness and headaches.   Psychiatric/Behavioral: Negative for dysphoric mood and sleep disturbance. The patient is not  "nervous/anxious.          OBJECTIVE:     /84   Pulse 68   Temp 97.4  F (36.3  C) (Tympanic)   Resp 16   Ht 1.873 m (6' 1.75\")   Wt 105.1 kg (231 lb 9.6 oz)   BMI 29.94 kg/m    Body mass index is 29.94 kg/m .  Physical Exam   Constitutional: He appears well-developed and well-nourished.   HENT:   Right Ear: Tympanic membrane and external ear normal.   Left Ear: Tympanic membrane and external ear normal.   Mouth/Throat: Uvula is midline, oropharynx is clear and moist and mucous membranes are normal.   Neck: Carotid bruit is not present. No thyromegaly present.   Cardiovascular: Normal rate, regular rhythm and normal heart sounds.   Pulmonary/Chest: Effort normal and breath sounds normal.   Abdominal: Soft. Bowel sounds are normal.   Musculoskeletal: He exhibits no edema.   Neurological: He is alert.   Skin: Skin is warm and dry.   Psychiatric: He has a normal mood and affect.   Diabetic foot exam completed:  Monofilmaent WNL bilat  DP, TP 2+ bilat  No open wounds or sores bilat     ASSESSMENT/PLAN:   1. Hypertension goal BP (blood pressure) < 140/80  Risk and potential complications of hypertension were reviewed with the patient  The patient understands that their hypertension in under good control currently  We reviewed the importance of medication compliance and regular follow-up  Lifestyle modification was encouraged  Encouraged to call or return:  With any chest pain or discomfort  Any shortness of breath or swelling of ankles   Headaches not relieved with over the counter meds  Any new or unexplained symptoms   Plan to follow up in 12 months  - lisinopril (PRINIVIL/ZESTRIL) 20 MG tablet; Take 1 tablet (20 mg) by mouth daily  Dispense: 90 tablet; Refill: 3    2. Hyperlipidemia LDL goal <100  Risks and potential complications of hyperlipemia were reviewed with the patient  The patient understands that her hyperlipidemia is under good  control  Lifestyle modification was encouraged   We reviewed the " importance of medication compliance regular follow up  Encouraged to call or return:  With any chest pain or discomfort  Any muscle or joint aches  Any shortness of breath or swelling to legs  Any new or unexplained symptoms   Plan to follow up in 12 months   - simvastatin (ZOCOR) 40 MG tablet; Take 1 tablet (40 mg) by mouth At Bedtime  Dispense: 90 tablet; Refill: 3    3. Type 2 diabetes mellitus with diabetic polyneuropathy, without long-term current use of insulin (H)  A1c remains uncontrolled.  Discussion held regarding increasing to 4 tablets orally daily-refuses to increase.  Discussed once weekly injectable-declines.  States does not want any other medications.  - glyBURIDE-metFORMIN (GLUCOVANCE) 5-500 MG tablet; Take 3 tablets by mouth daily  Dispense: 270 tablet; Refill: 1  - CBC with platelets differential  - Comprehensive metabolic panel  - Albumin Random Urine Quantitative with Creat Ratio  - Lipid panel reflex to direct LDL Fasting  - Hemoglobin A1c    4. Hereditary and idiopathic peripheral neuropathy  Declines numbness or tingling at this time.          LATHA Machuca [facility]

## 2019-09-23 ENCOUNTER — TELEPHONE (OUTPATIENT)
Dept: FAMILY MEDICINE | Facility: CLINIC | Age: 64
End: 2019-09-23

## 2019-09-23 NOTE — TELEPHONE ENCOUNTER
Panel Management Review      Patient has the following on his problem list:     Diabetes    ASA: Passed    Last A1C  Lab Results   Component Value Date    A1C 9.8 05/09/2019    A1C 10.5 03/29/2018    A1C 9.4 05/08/2017    A1C 7.2 08/09/2016    A1C 8.6 04/04/2016     A1C tested: FAILED    Last LDL:    Lab Results   Component Value Date    CHOL 148 05/09/2019     Lab Results   Component Value Date    HDL 42 05/09/2019     Lab Results   Component Value Date    LDL 67 05/09/2019     Lab Results   Component Value Date    TRIG 196 05/09/2019     Lab Results   Component Value Date    CHOLHDLRATIO 4.0 06/01/2012     Lab Results   Component Value Date    NHDL 106 05/09/2019       Is the patient on a Statin? YES             Is the patient on Aspirin? YES    Medications     HMG CoA Reductase Inhibitors     simvastatin (ZOCOR) 40 MG tablet       Salicylates     aspirin 81 MG tablet             Last three blood pressure readings:  BP Readings from Last 3 Encounters:   05/09/19 130/84   03/30/18 136/74   10/04/16 (!) 148/92       Date of last diabetes office visit: 5/9/19     Tobacco History:     History   Smoking Status     Never Smoker   Smokeless Tobacco     Never Used           IVD   ASA: Passed    Last LDL:    Lab Results   Component Value Date    CHOL 148 05/09/2019     Lab Results   Component Value Date    HDL 42 05/09/2019     Lab Results   Component Value Date    LDL 67 05/09/2019     Lab Results   Component Value Date    TRIG 196 05/09/2019        Lab Results   Component Value Date    CHOLHDLRATIO 4.0 06/01/2012        Is the patient on a Statin? YES   Is the patient on Aspirin? YES                  Medications     HMG CoA Reductase Inhibitors     simvastatin (ZOCOR) 40 MG tablet       Salicylates     aspirin 81 MG tablet             Last three blood pressure readings:  BP Readings from Last 3 Encounters:   05/09/19 130/84   03/30/18 136/74   10/04/16 (!) 148/92        Tobacco History:     History   Smoking Status      Never Smoker   Smokeless Tobacco     Never Used       Hypertension   Last three blood pressure readings:  BP Readings from Last 3 Encounters:   05/09/19 130/84   03/30/18 136/74   10/04/16 (!) 148/92     Blood pressure: Passed    HTN Guidelines:  Less than 140/90      Composite cancer screening  Chart review shows that this patient is due/due soon for the following Colonoscopy/Fecal Colorectal (FIT)  Summary:    Patient is due/failing the following:     Health Maintenance Due   Topic Date Due     PREVENTIVE CARE VISIT  1955     COLONOSCOPY  04/11/1966     PNEUMOCOCCAL IMMUNIZATION 19-64 MEDIUM RISK (1 of 1 - PPSV23) 04/11/1975     ZOSTER IMMUNIZATION (1 of 2) 04/11/2006     EYE EXAM  03/29/2013     DIABETIC FOOT EXAM  03/29/2019     A1C  08/09/2019     INFLUENZA VACCINE (1) 09/01/2019         Action needed:   Patient needs office visit for diabetic check and discuss rest of health maintenance.    Type of outreach:    9/23/19  Spoke to patient. Diabetic appt scheduled 11/6/19.    Questions for provider review:    None                                                                                                                                    Concepción Decker CMA       Chart routed to none.

## 2019-11-06 ENCOUNTER — OFFICE VISIT (OUTPATIENT)
Dept: FAMILY MEDICINE | Facility: CLINIC | Age: 64
End: 2019-11-06
Payer: COMMERCIAL

## 2019-11-06 VITALS
SYSTOLIC BLOOD PRESSURE: 120 MMHG | RESPIRATION RATE: 16 BRPM | DIASTOLIC BLOOD PRESSURE: 70 MMHG | WEIGHT: 233.4 LBS | BODY MASS INDEX: 30.17 KG/M2 | TEMPERATURE: 97.3 F | HEART RATE: 68 BPM

## 2019-11-06 DIAGNOSIS — E78.5 HYPERLIPIDEMIA LDL GOAL <100: Primary | ICD-10-CM

## 2019-11-06 DIAGNOSIS — E11.42 TYPE 2 DIABETES MELLITUS WITH DIABETIC POLYNEUROPATHY, WITHOUT LONG-TERM CURRENT USE OF INSULIN (H): ICD-10-CM

## 2019-11-06 DIAGNOSIS — I10 HYPERTENSION GOAL BP (BLOOD PRESSURE) < 140/80: ICD-10-CM

## 2019-11-06 LAB
ANION GAP SERPL CALCULATED.3IONS-SCNC: 3 MMOL/L (ref 3–14)
BUN SERPL-MCNC: 20 MG/DL (ref 7–30)
CALCIUM SERPL-MCNC: 9.1 MG/DL (ref 8.5–10.1)
CHLORIDE SERPL-SCNC: 105 MMOL/L (ref 94–109)
CO2 SERPL-SCNC: 27 MMOL/L (ref 20–32)
CREAT SERPL-MCNC: 1.32 MG/DL (ref 0.66–1.25)
GFR SERPL CREATININE-BSD FRML MDRD: 57 ML/MIN/{1.73_M2}
GLUCOSE SERPL-MCNC: 173 MG/DL (ref 70–99)
HBA1C MFR BLD: 9.1 % (ref 0–5.6)
POTASSIUM SERPL-SCNC: 4.7 MMOL/L (ref 3.4–5.3)
SODIUM SERPL-SCNC: 135 MMOL/L (ref 133–144)

## 2019-11-06 PROCEDURE — 83036 HEMOGLOBIN GLYCOSYLATED A1C: CPT | Performed by: NURSE PRACTITIONER

## 2019-11-06 PROCEDURE — 36415 COLL VENOUS BLD VENIPUNCTURE: CPT | Performed by: NURSE PRACTITIONER

## 2019-11-06 PROCEDURE — 99213 OFFICE O/P EST LOW 20 MIN: CPT | Performed by: NURSE PRACTITIONER

## 2019-11-06 PROCEDURE — 80048 BASIC METABOLIC PNL TOTAL CA: CPT | Performed by: NURSE PRACTITIONER

## 2019-11-06 RX ORDER — GLYBURIDE-METFORMIN HYDROCHLORIDE 5; 500 MG/1; MG/1
4 TABLET ORAL DAILY
Qty: 360 TABLET | Refills: 1 | Status: SHIPPED | OUTPATIENT
Start: 2019-11-06 | End: 2020-04-27

## 2019-11-06 SDOH — HEALTH STABILITY: MENTAL HEALTH: HOW OFTEN DO YOU HAVE A DRINK CONTAINING ALCOHOL?: NEVER

## 2019-11-06 ASSESSMENT — ENCOUNTER SYMPTOMS
PALPITATIONS: 0
SINUS PRESSURE: 0
RHINORRHEA: 0
DIZZINESS: 0
HEADACHES: 0
ARTHRALGIAS: 0
SHORTNESS OF BREATH: 0
DIARRHEA: 0
WHEEZING: 0
NERVOUS/ANXIOUS: 0
ABDOMINAL PAIN: 0
LIGHT-HEADEDNESS: 0
DYSPHORIC MOOD: 0
DYSURIA: 0
SLEEP DISTURBANCE: 0
NUMBNESS: 0
FREQUENCY: 0
FATIGUE: 0
CONSTIPATION: 0
COUGH: 0
JOINT SWELLING: 0
SORE THROAT: 0

## 2019-11-06 ASSESSMENT — PAIN SCALES - GENERAL: PAINLEVEL: NO PAIN (0)

## 2019-11-06 NOTE — PROGRESS NOTES
Subjective     Juan Carlos Rizo is a 64 year old male who presents to clinic today for the following health issues:    HPI     Declines colon cancer screening.    Diabetes Follow-up      How often are you checking your blood sugar? Not at all    What concerns do you have today about your diabetes? None     Do you have any of these symptoms? (Select all that apply)  No numbness or tingling in feet.  No redness, sores or blisters on feet.  No complaints of excessive thirst.  No reports of blurry vision.  No significant changes to weight.     Have you had a diabetic eye exam in the last 12 months? No    Diabetes Management Resources    Hyperlipidemia Follow-Up      Are you having any of the following symptoms? (Select all that apply)  No complaints of shortness of breath, chest pain or pressure.  No increased sweating or nausea with activity.  No left-sided neck or arm pain.  No complaints of pain in calves when walking 1-2 blocks.    Are you regularly taking any medication or supplement to lower your cholesterol?   Yes- simvastatin 40 mg    Are you having muscle aches or other side effects that you think could be caused by your cholesterol lowering medication?  No    Hypertension Follow-up      Do you check your blood pressure regularly outside of the clinic? Yes     Are you following a low salt diet? No    Are your blood pressures ever more than 140 on the top number (systolic) OR more   than 90 on the bottom number (diastolic), for example 140/90? No    BP Readings from Last 2 Encounters:   11/06/19 120/70   05/09/19 130/84     Hemoglobin A1C (%)   Date Value   11/06/2019 9.1 (H)   05/09/2019 9.8 (H)     LDL Cholesterol Calculated (mg/dL)   Date Value   05/09/2019 67   03/29/2018 78         How many servings of fruits and vegetables do you eat daily?  2-3    On average, how many sweetened beverages do you drink each day (soda, juice, sweet tea, etc)?   0    How many days per week do you miss taking your medication?  0      Current Outpatient Medications   Medication Sig Dispense Refill     glyBURIDE-metFORMIN (GLUCOVANCE) 5-500 MG tablet Take 4 tablets by mouth daily 360 tablet 1     lisinopril (PRINIVIL/ZESTRIL) 20 MG tablet Take 1 tablet (20 mg) by mouth daily 90 tablet 3     simvastatin (ZOCOR) 40 MG tablet Take 1 tablet (40 mg) by mouth At Bedtime 90 tablet 3     aspirin 81 MG tablet Take 1 tablet by mouth daily.       BLOOD GLUCOSE TEST STRIPS STRP PER PATIENT HEALTH PLAN (Patient not taking: No sig reported) 1 bottle rpn     Allergies   Allergen Reactions     Anesthetic [Amides] Nausea and Vomiting     Pt. Denies allergy to local anesthetics.  Had an episode of n/v with prior GA in 1981.  Pt. Has had prior amide local anesthetics without problems.       Reviewed and updated as needed this visit by Provider       Here today for diabetic check. Has not been checking blood sugars at home. States blood sugars have been well controlled. Most recently started taking 4 metformin for the last month.  Will open pill bottle and shake pills into hand and however many fall into his hand is how many takes.  Has this shake just right now that is usually getting 4 pills.  Previously had been taking 3.  Reports during the month of October was in [employer] fishing for 3 weeks.  Was not paying any attention to what 8.  Was there with the group of guys-all 8 what ever we wanted.  No chest pain or palpitations, no increased shortness of breath, denies numbness or tingling to feet.  States does not want any more medications ordered we will continue to take current medications that is on and that is it!    Does not ever want a colonoscopy, does not want to do a fit test, is never going to see an eye doctor again.  Has a prosthetic right eye and was supposed to continue to follow-up for that and never did, reports never plans to.  Declines any vaccines.          Objective    /70 (BP Location: Left arm, Patient Position: Sitting, Cuff Size:  Adult Regular)   Pulse 68   Temp 97.3  F (36.3  C) (Tympanic)   Resp 16   Wt 105.9 kg (233 lb 6.4 oz)   BMI 30.17 kg/m    Body mass index is 30.17 kg/m .          Assessment & Plan      Review of Systems   Constitutional: Negative for fatigue.   HENT: Negative for congestion, ear pain, rhinorrhea, sinus pressure and sore throat.    Respiratory: Negative for cough, shortness of breath and wheezing.    Cardiovascular: Negative for chest pain, palpitations and leg swelling.   Gastrointestinal: Negative for abdominal pain, constipation and diarrhea.   Genitourinary: Negative for dysuria and frequency.   Musculoskeletal: Negative for arthralgias and joint swelling.   Skin: Negative for rash.   Neurological: Negative for dizziness, light-headedness, numbness and headaches.   Psychiatric/Behavioral: Negative for dysphoric mood and sleep disturbance. The patient is not nervous/anxious.        Physical Exam  Constitutional:       Appearance: He is well-developed.   HENT:      Right Ear: Tympanic membrane and external ear normal.      Left Ear: Tympanic membrane and external ear normal.      Mouth/Throat:      Pharynx: Uvula midline.   Neck:      Thyroid: No thyromegaly.      Vascular: No carotid bruit.   Cardiovascular:      Rate and Rhythm: Normal rate and regular rhythm.      Heart sounds: Normal heart sounds.   Pulmonary:      Effort: Pulmonary effort is normal.      Breath sounds: Normal breath sounds.   Abdominal:      General: Bowel sounds are normal.      Palpations: Abdomen is soft.   Skin:     General: Skin is warm and dry.   Neurological:      Mental Status: He is alert.           1. Type 2 diabetes mellitus with diabetic polyneuropathy, without long-term current use of insulin (H)  We will plan to check A1c here today.  Refills given.    - HEMOGLOBIN A1C  - Basic metabolic panel  - glyBURIDE-metFORMIN (GLUCOVANCE) 5-500 MG tablet; Take 4 tablets by mouth daily  Dispense: 360 tablet; Refill: 1    2.  Hyperlipidemia LDL goal <100  Continue current    3. Hypertension goal BP (blood pressure) < 140/80  Blood pressure well controlled in clinic.  We will plan to continue current medication.         Return in about 6 months (around 5/6/2020), or with fasting labs.    LATHA Machuca [facility]

## 2019-12-09 ENCOUNTER — TELEPHONE (OUTPATIENT)
Dept: FAMILY MEDICINE | Facility: CLINIC | Age: 64
End: 2019-12-09

## 2019-12-09 DIAGNOSIS — Z12.11 ENCOUNTER FOR FIT (FECAL IMMUNOCHEMICAL TEST) SCREENING: Primary | ICD-10-CM

## 2020-04-26 DIAGNOSIS — I10 HYPERTENSION GOAL BP (BLOOD PRESSURE) < 140/80: ICD-10-CM

## 2020-04-26 DIAGNOSIS — E11.42 TYPE 2 DIABETES MELLITUS WITH DIABETIC POLYNEUROPATHY, WITHOUT LONG-TERM CURRENT USE OF INSULIN (H): ICD-10-CM

## 2020-04-26 DIAGNOSIS — E78.5 HYPERLIPIDEMIA LDL GOAL <100: ICD-10-CM

## 2020-04-27 RX ORDER — SIMVASTATIN 40 MG
TABLET ORAL
Qty: 90 TABLET | Refills: 3 | Status: SHIPPED | OUTPATIENT
Start: 2020-04-27 | End: 2021-04-01

## 2020-04-27 RX ORDER — GLYBURIDE-METFORMIN HYDROCHLORIDE 5; 500 MG/1; MG/1
TABLET ORAL
Qty: 360 TABLET | Refills: 1 | Status: SHIPPED | OUTPATIENT
Start: 2020-04-27 | End: 2020-11-09

## 2020-04-27 RX ORDER — LISINOPRIL 20 MG/1
TABLET ORAL
Qty: 90 TABLET | Refills: 3 | Status: SHIPPED | OUTPATIENT
Start: 2020-04-27 | End: 2021-04-01

## 2020-04-27 NOTE — TELEPHONE ENCOUNTER
Chart reviewed. RX approved. Please schedule in early May for 6 month follow up with PCP as indicated in most recent visit. May complete this virtually.

## 2020-05-04 ENCOUNTER — VIRTUAL VISIT (OUTPATIENT)
Dept: FAMILY MEDICINE | Facility: CLINIC | Age: 65
End: 2020-05-04
Payer: COMMERCIAL

## 2020-05-04 DIAGNOSIS — E11.42 TYPE 2 DIABETES MELLITUS WITH DIABETIC POLYNEUROPATHY, WITHOUT LONG-TERM CURRENT USE OF INSULIN (H): Primary | ICD-10-CM

## 2020-05-04 DIAGNOSIS — I10 HYPERTENSION GOAL BP (BLOOD PRESSURE) < 140/80: ICD-10-CM

## 2020-05-04 DIAGNOSIS — E78.5 HYPERLIPIDEMIA LDL GOAL <100: ICD-10-CM

## 2020-05-04 PROCEDURE — 99214 OFFICE O/P EST MOD 30 MIN: CPT | Mod: 95 | Performed by: NURSE PRACTITIONER

## 2020-05-04 ASSESSMENT — ENCOUNTER SYMPTOMS
ROS GI COMMENTS: LOOSER STOOLS
VOMITING: 0
CHEST TIGHTNESS: 0
NAUSEA: 0
WEAKNESS: 0
ARTHRALGIAS: 0
FREQUENCY: 0
SHORTNESS OF BREATH: 0
NUMBNESS: 0
DIARRHEA: 0
PALPITATIONS: 0
MYALGIAS: 0

## 2020-05-04 NOTE — PROGRESS NOTES
"Juan Carlos Rizo is a 65 year old male who is being evaluated via a billable telephone visit.      The patient has been notified of following:     \"This telephone visit will be conducted via a call between you and your physician/provider. We have found that certain health care needs can be provided without the need for a physical exam.  This service lets us provide the care you need with a short phone conversation.  If a prescription is necessary we can send it directly to your pharmacy.  If lab work is needed we can place an order for that and you can then stop by our lab to have the test done at a later time.    Telephone visits are billed at different rates depending on your insurance coverage. During this emergency period, for some insurers they may be billed the same as an in-person visit.  Please reach out to your insurance provider with any questions.    If during the course of the call the physician/provider feels a telephone visit is not appropriate, you will not be charged for this service.\"    Patient has given verbal consent for Telephone visit?  Yes    What phone number would you like to be contacted at? [phone]    How would you like to obtain your AVS? Mail a copy. not needed patient declined    Subjective     Juan Carlos Rizo is a 65 year old male who presents to clinic today for the following health issues:    HPI  Diabetes Follow-up      How often are you checking your blood sugar? Not at all    What concerns do you have today about your diabetes? None     Do you have any of these symptoms? (Select all that apply)  No numbness or tingling in feet.  No redness, sores or blisters on feet.  No complaints of excessive thirst.  No reports of blurry vision.  No significant changes to weight.    Have you had a diabetic eye exam in the last 12 months? No        Hyperlipidemia Follow-Up      Are you regularly taking any medication or supplement to lower your cholesterol?   Yes- simvastatin    Are you having " muscle aches or other side effects that you think could be caused by your cholesterol lowering medication?  No    Hypertension Follow-up      Do you check your blood pressure regularly outside of the clinic? Yes     Are you following a low salt diet? No    Are your blood pressures ever more than 140 on the top number (systolic) OR more   than 90 on the bottom number (diastolic), for example 140/90? No    BP Readings from Last 2 Encounters:   11/06/19 120/70   05/09/19 130/84     Hemoglobin A1C (%)   Date Value   11/06/2019 9.1 (H)   05/09/2019 9.8 (H)     LDL Cholesterol Calculated (mg/dL)   Date Value   05/09/2019 67   03/29/2018 78         How many servings of fruits and vegetables do you eat daily?  2-3    On average, how many sweetened beverages do you drink each day (Examples: soda, juice, sweet tea, etc.  Do NOT count diet or artificially sweetened beverages)?   0    How many days per week do you exercise enough to make your heart beat faster? 3 or less    How many minutes a day do you exercise enough to make your heart beat faster? 9 or less  How many days per week do you miss taking your medication? 1    What makes it hard for you to take your medications?  remembering to take maybe once a month        Current Outpatient Medications   Medication Sig Dispense Refill     glyBURIDE-metFORMIN (GLUCOVANCE) 5-500 MG tablet TAKE FOUR TABLETS BY MOUTH DAILY 360 tablet 1     lisinopril (ZESTRIL) 20 MG tablet TAKE ONE TABLET BY MOUTH ONCE DAILY 90 tablet 3     simvastatin (ZOCOR) 40 MG tablet TAKE ONE TABLET BY MOUTH EVERY NIGHT AT BEDTIME 90 tablet 3     aspirin 81 MG tablet Take 1 tablet by mouth daily.       BLOOD GLUCOSE TEST STRIPS STRP PER PATIENT HEALTH PLAN (Patient not taking: No sig reported) 1 bottle rpn     Allergies   Allergen Reactions     Anesthetic [Amides] Nausea and Vomiting     Pt. Denies allergy to local anesthetics.  Had an episode of n/v with prior GA in 1981.  Pt. Has had prior amide local  anesthetics without problems.       Reviewed and updated as needed this visit by Provider  Problems         Phone call visit completed due to COVID-19. Feels like is doing as well as can be.  Did check blood pressure the other day at work and was 121/80.  Is not checking blood sugars.  Is having looser stools.  Is not able to take 4 Glucovance due to diarrhea.  No over-the-counter medications for diarrhea.  Reports wife is always making cakes, getting ice cream, making brownies, coffee cake and cooks potatoes daily for meals.  Does not make own meals wife does all of the cooking.  Is checking feet for sores and does not have any.  No numbness or tingling to feet.    Declines colonoscopy.    Review of Systems   Respiratory: Negative for chest tightness and shortness of breath.    Cardiovascular: Negative for chest pain, palpitations and leg swelling.   Gastrointestinal: Negative for diarrhea, nausea and vomiting.        Looser stools     Genitourinary: Negative for frequency.   Musculoskeletal: Negative for arthralgias and myalgias.   Neurological: Negative for weakness and numbness.          Objective           Assessment/Plan:  1. Type 2 diabetes mellitus with diabetic polyneuropathy, without long-term current use of insulin (H)  We will plan to continue current medications.  Resistant to starting additional medications.  Will start over-the-counter Imodium orally daily to see if that decreases loose stools  Will set up for fasting lab appointment when [place] office opens.  - Hemoglobin A1c; Future  - Lipid panel reflex to direct LDL Fasting; Future  - Albumin Random Urine Quantitative with Creat Ratio; Future  - Comprehensive metabolic panel; Future      2. Hypertension goal BP (blood pressure) < 140/80  Reports blood pressure currently well controlled-  Stable.  We will plan to continue current dose of lisinopril.  We will set up for labs in July  - Albumin Random Urine Quantitative with Creat Ratio;  Future  - Comprehensive metabolic panel; Future    3. Hyperlipidemia LDL goal <100  Stable.  Will set up for fasting labs in July.  We will plan to continue current dose of simvastatin for now.  Consider decreasing dose once triglycerides are controlled.  - Lipid panel reflex to direct LDL Fasting; Future    No follow-ups on file.      Phone call duration:  32 minutes    LATHA Machuca CNP

## 2020-07-28 ENCOUNTER — TELEPHONE (OUTPATIENT)
Dept: FAMILY MEDICINE | Facility: CLINIC | Age: 65
End: 2020-07-28

## 2020-07-28 NOTE — TELEPHONE ENCOUNTER
----- Message from LATHA Whitlock CNP sent at 7/16/2020  9:35 AM CDT -----  Regarding: Labs  Please call Juan Carlos and remind him to set up his lab appointment and help him schedule it.  The labs were placed in May.  Shira MAXWELL

## 2020-11-09 DIAGNOSIS — E11.42 TYPE 2 DIABETES MELLITUS WITH DIABETIC POLYNEUROPATHY, WITHOUT LONG-TERM CURRENT USE OF INSULIN (H): ICD-10-CM

## 2020-11-12 NOTE — TELEPHONE ENCOUNTER
"Requested Prescriptions   Pending Prescriptions Disp Refills     glyBURIDE-metFORMIN (GLUCOVANCE) 5-500 MG tablet 360 tablet 1       Combination Oral Antihyperglycemic Agents Failed - 11/9/2020  2:07 PM        Failed - Patient has documented A1c within the specified period of time.     If HgbA1C is 8 or greater, it needs to be on file within the past 3 months.  If less than 8, must be on file within the past 6 months.     Recent Labs   Lab Test 11/06/19  1435   A1C 9.1*             Failed - Patient's CR is NOT>1.4 OR Patient's EGFR is NOT<45 within past 12 mos.     Recent Labs   Lab Test 11/06/19  1435   GFRESTIMATED 57*   GFRESTBLACK 66       Recent Labs   Lab Test 11/06/19  1435   CR 1.32*             Failed - Recent (6 mo) or future (30 days) visit within the authorizing provider's specialty     Patient had office visit in the last 6 months or has a visit in the next 30 days with authorizing provider or within the authorizing provider's specialty.  See \"Patient Info\" tab in inbasket, or \"Choose Columns\" in Meds & Orders section of the refill encounter.            Passed - Patient does not have a diagnosis of CHF.        Passed - Medication is active on med list        Passed - Patient is 18 years old or older.             "

## 2020-11-13 RX ORDER — GLYBURIDE-METFORMIN HYDROCHLORIDE 5; 500 MG/1; MG/1
TABLET ORAL
Qty: 120 TABLET | Refills: 0 | Status: SHIPPED | OUTPATIENT
Start: 2020-11-13 | End: 2020-12-11

## 2021-04-01 ENCOUNTER — OFFICE VISIT (OUTPATIENT)
Dept: FAMILY MEDICINE | Facility: CLINIC | Age: 66
End: 2021-04-01
Payer: COMMERCIAL

## 2021-04-01 VITALS
RESPIRATION RATE: 12 BRPM | HEART RATE: 72 BPM | SYSTOLIC BLOOD PRESSURE: 130 MMHG | HEIGHT: 74 IN | BODY MASS INDEX: 29.72 KG/M2 | WEIGHT: 231.6 LBS | TEMPERATURE: 97.6 F | DIASTOLIC BLOOD PRESSURE: 68 MMHG

## 2021-04-01 DIAGNOSIS — N18.31 STAGE 3A CHRONIC KIDNEY DISEASE (H): ICD-10-CM

## 2021-04-01 DIAGNOSIS — E11.42 TYPE 2 DIABETES MELLITUS WITH POLYNEUROPATHY (H): Primary | ICD-10-CM

## 2021-04-01 DIAGNOSIS — E78.5 HYPERLIPIDEMIA LDL GOAL <100: ICD-10-CM

## 2021-04-01 DIAGNOSIS — I10 HYPERTENSION GOAL BP (BLOOD PRESSURE) < 140/80: ICD-10-CM

## 2021-04-01 PROBLEM — N18.5 CKD (CHRONIC KIDNEY DISEASE) STAGE 5, GFR LESS THAN 15 ML/MIN (H): Status: RESOLVED | Noted: 2021-04-01 | Resolved: 2021-04-01

## 2021-04-01 PROBLEM — N18.5 CKD (CHRONIC KIDNEY DISEASE) STAGE 5, GFR LESS THAN 15 ML/MIN (H): Status: ACTIVE | Noted: 2021-04-01

## 2021-04-01 PROBLEM — N18.4 CKD (CHRONIC KIDNEY DISEASE) STAGE 4, GFR 15-29 ML/MIN (H): Status: ACTIVE | Noted: 2021-04-01

## 2021-04-01 PROBLEM — N18.4 CKD (CHRONIC KIDNEY DISEASE) STAGE 4, GFR 15-29 ML/MIN (H): Status: RESOLVED | Noted: 2021-04-01 | Resolved: 2021-04-01

## 2021-04-01 LAB
ALBUMIN SERPL-MCNC: 3.9 G/DL (ref 3.4–5)
ALP SERPL-CCNC: 72 U/L (ref 40–150)
ALT SERPL W P-5'-P-CCNC: 38 U/L (ref 0–70)
ANION GAP SERPL CALCULATED.3IONS-SCNC: 5 MMOL/L (ref 3–14)
AST SERPL W P-5'-P-CCNC: 13 U/L (ref 0–45)
BILIRUB SERPL-MCNC: 0.4 MG/DL (ref 0.2–1.3)
BUN SERPL-MCNC: 18 MG/DL (ref 7–30)
CALCIUM SERPL-MCNC: 9.1 MG/DL (ref 8.5–10.1)
CHLORIDE SERPL-SCNC: 103 MMOL/L (ref 94–109)
CHOLEST SERPL-MCNC: 178 MG/DL
CO2 SERPL-SCNC: 28 MMOL/L (ref 20–32)
CREAT SERPL-MCNC: 1.22 MG/DL (ref 0.66–1.25)
CREAT UR-MCNC: 233 MG/DL
GFR SERPL CREATININE-BSD FRML MDRD: 62 ML/MIN/{1.73_M2}
GLUCOSE SERPL-MCNC: 322 MG/DL (ref 70–99)
HBA1C MFR BLD: 11.5 % (ref 0–5.6)
HDLC SERPL-MCNC: 48 MG/DL
LDLC SERPL CALC-MCNC: 83 MG/DL
MICROALBUMIN UR-MCNC: 95 MG/L
MICROALBUMIN/CREAT UR: 40.69 MG/G CR (ref 0–17)
NONHDLC SERPL-MCNC: 130 MG/DL
POTASSIUM SERPL-SCNC: 4.5 MMOL/L (ref 3.4–5.3)
PROT SERPL-MCNC: 7.2 G/DL (ref 6.8–8.8)
SODIUM SERPL-SCNC: 136 MMOL/L (ref 133–144)
TRIGL SERPL-MCNC: 233 MG/DL
VIT B12 SERPL-MCNC: 232 PG/ML (ref 193–986)

## 2021-04-01 PROCEDURE — 83036 HEMOGLOBIN GLYCOSYLATED A1C: CPT | Performed by: NURSE PRACTITIONER

## 2021-04-01 PROCEDURE — 99207 PR FOOT EXAM NO CHARGE: CPT | Mod: 25 | Performed by: NURSE PRACTITIONER

## 2021-04-01 PROCEDURE — 99214 OFFICE O/P EST MOD 30 MIN: CPT | Performed by: NURSE PRACTITIONER

## 2021-04-01 PROCEDURE — 82043 UR ALBUMIN QUANTITATIVE: CPT | Performed by: NURSE PRACTITIONER

## 2021-04-01 PROCEDURE — 82607 VITAMIN B-12: CPT | Performed by: NURSE PRACTITIONER

## 2021-04-01 PROCEDURE — 36415 COLL VENOUS BLD VENIPUNCTURE: CPT | Performed by: NURSE PRACTITIONER

## 2021-04-01 PROCEDURE — 80061 LIPID PANEL: CPT | Performed by: NURSE PRACTITIONER

## 2021-04-01 PROCEDURE — 80053 COMPREHEN METABOLIC PANEL: CPT | Performed by: NURSE PRACTITIONER

## 2021-04-01 RX ORDER — SIMVASTATIN 40 MG
TABLET ORAL
Qty: 90 TABLET | Refills: 3 | Status: SHIPPED | OUTPATIENT
Start: 2021-04-01 | End: 2022-01-26

## 2021-04-01 RX ORDER — GLYBURIDE 5 MG/1
10 TABLET ORAL
Qty: 180 TABLET | Refills: 1
Start: 2021-04-01 | End: 2021-10-03

## 2021-04-01 RX ORDER — LISINOPRIL 20 MG/1
20 TABLET ORAL DAILY
Qty: 90 TABLET | Refills: 3 | Status: SHIPPED | OUTPATIENT
Start: 2021-04-01 | End: 2022-01-26

## 2021-04-01 RX ORDER — METFORMIN HCL 500 MG
2000 TABLET, EXTENDED RELEASE 24 HR ORAL
Qty: 360 TABLET | Refills: 1 | Status: SHIPPED | OUTPATIENT
Start: 2021-04-01 | End: 2021-09-20

## 2021-04-01 RX ORDER — GLYBURIDE 5 MG/1
10 TABLET ORAL
Qty: 180 TABLET | Refills: 1 | Status: SHIPPED | OUTPATIENT
Start: 2021-04-01 | End: 2021-04-01

## 2021-04-01 ASSESSMENT — ENCOUNTER SYMPTOMS
HEADACHES: 0
DIARRHEA: 0
ABDOMINAL PAIN: 0
DYSURIA: 0
DIZZINESS: 0
WHEEZING: 0
RHINORRHEA: 0
COUGH: 0
FREQUENCY: 0
SHORTNESS OF BREATH: 0
SLEEP DISTURBANCE: 0
LIGHT-HEADEDNESS: 0
JOINT SWELLING: 0
PALPITATIONS: 0
CONSTIPATION: 0
ARTHRALGIAS: 0
NERVOUS/ANXIOUS: 0
SORE THROAT: 0
FATIGUE: 0
SINUS PRESSURE: 0
NUMBNESS: 0
DYSPHORIC MOOD: 0

## 2021-04-01 ASSESSMENT — MIFFLIN-ST. JEOR: SCORE: 1914.25

## 2021-04-01 ASSESSMENT — PAIN SCALES - GENERAL: PAINLEVEL: NO PAIN (0)

## 2021-04-01 NOTE — LETTER
April 2, 2021      Juan Carlos Rizo  [address]  [place] [address]        Dear Juan Carlos,    You are spilling some protein into your urine.  This is because your blood sugars have been more elevated and your kidneys are feeling the pressure from that.  Hopefully, the new medication will help to bring your blood pressure down and will improve this.     Your electrolytes are normal range.   Your kidney function is normal.   Your blood sugar is elevated as expected at 322   Your liver function is normal.     Your triglycerides are elevated. You need to try and increase your activity, 30 minutes 3-4 times per week and have a lower carbohydrate diet. Plan to recheck fasting lipids in 12 months.  We will plan to continue your current dose of simvastatin for now.  May need to change to a stronger statin medication in the future if there is no improvement.     Resulted Orders   Albumin Random Urine Quantitative with Creat Ratio   Result Value Ref Range    Creatinine Urine 233 mg/dL    Albumin Urine mg/L 95 mg/L    Albumin Urine mg/g Cr 40.69 (H) 0 - 17 mg/g Cr   Comprehensive metabolic panel   Result Value Ref Range    Sodium 136 133 - 144 mmol/L    Potassium 4.5 3.4 - 5.3 mmol/L    Chloride 103 94 - 109 mmol/L    Carbon Dioxide 28 20 - 32 mmol/L    Anion Gap 5 3 - 14 mmol/L    Glucose 322 (H) 70 - 99 mg/dL    Urea Nitrogen 18 7 - 30 mg/dL    Creatinine 1.22 0.66 - 1.25 mg/dL    GFR Estimate 62 >60 mL/min/[1.73_m2]      Comment:      Non  GFR Calc  Starting 12/18/2018, serum creatinine based estimated GFR (eGFR) will be   calculated using the Chronic Kidney Disease Epidemiology Collaboration   (CKD-EPI) equation.      GFR Estimate If Black 72 >60 mL/min/[1.73_m2]      Comment:       GFR Calc  Starting 12/18/2018, serum creatinine based estimated GFR (eGFR) will be   calculated using the Chronic Kidney Disease Epidemiology Collaboration   (CKD-EPI) equation.      Calcium 9.1 8.5 -  10.1 mg/dL    Bilirubin Total 0.4 0.2 - 1.3 mg/dL    Albumin 3.9 3.4 - 5.0 g/dL    Protein Total 7.2 6.8 - 8.8 g/dL    Alkaline Phosphatase 72 40 - 150 U/L    ALT 38 0 - 70 U/L    AST 13 0 - 45 U/L   Hemoglobin A1c   Result Value Ref Range    Hemoglobin A1C 11.5 (H) 0 - 5.6 %      Comment:      Results confirmed by repeat test  Normal <5.7% Prediabetes 5.7-6.4%  Diabetes 6.5% or higher - adopted from ADA   consensus guidelines.     Lipid panel reflex to direct LDL Fasting   Result Value Ref Range    Cholesterol 178 <200 mg/dL    Triglycerides 233 (H) <150 mg/dL      Comment:      Borderline high:  150-199 mg/dl  High:             200-499 mg/dl  Very high:       >499 mg/dl      HDL Cholesterol 48 >39 mg/dL    LDL Cholesterol Calculated 83 <100 mg/dL      Comment:      Desirable:       <100 mg/dl    Non HDL Cholesterol 130 (H) <130 mg/dL      Comment:      Above Desirable:  130-159 mg/dl  Borderline high:  160-189 mg/dl  High:             190-219 mg/dl  Very high:       >219 mg/dl     Vitamin B12   Result Value Ref Range    Vitamin B12 232 193 - 986 pg/mL       If you have any questions or concerns, please call the clinic at the number listed above.       Sincerely,      LATHA Whitlock CNP

## 2021-04-01 NOTE — PROGRESS NOTES
"    Assessment & Plan     Type 2 diabetes mellitus with polyneuropathy (H)  Long discussion held with Juan Carlos regarding my concern for significantly elevated blood sugars.  A1c 11.5.  Up from 9.1 last check in November 2019.  We will plan to discontinue Glucovance.  Start Metformin 2000 mg daily.  Will decrease glyburide dose to 10 mg total daily.  Is agreeable to starting Januvia.  Educated regarding possible side effect.  Notify if too expensive  - Albumin Random Urine Quantitative with Creat Ratio  - Comprehensive metabolic panel  - Hemoglobin A1c  - Lipid panel reflex to direct LDL Fasting  - Vitamin B12  - JUST IN CASE  - FOOT EXAM  - sitagliptin (JANUVIA) 25 MG tablet; Take 1 tablet (25 mg) by mouth daily  - metFORMIN (GLUCOPHAGE-XR) 500 MG 24 hr tablet; Take 4 tablets (2,000 mg) by mouth daily (with dinner)  - glyBURIDE (DIABETA /MICRONASE) 5 MG tablet; Take 2 tablets (10 mg) by mouth daily (with breakfast)  Plan to follow-up in 6 months or sooner if needed.    Hyperlipidemia LDL goal <100  Stable-tolerating simvastatin well.  Refills given.  Plan to follow-up in 1 year or sooner if needed.  Will recheck fasting lipids here today  - simvastatin (ZOCOR) 40 MG tablet; TAKE ONE TABLET BY MOUTH EVERY NIGHT AT BEDTIME    Stage 3a chronic kidney disease  We will recheck kidney function here today.  Reinforced importance of blood sugar control    Hypertension goal BP (blood pressure) < 140/80  Stable-blood pressure well controlled today in clinic.  We will plan to continue lisinopril at current dose.  Refill sent.  Plan to follow-up in 1 year or sooner if needed  - lisinopril (ZESTRIL) 20 MG tablet; Take 1 tablet (20 mg) by mouth daily    37 minutes spent on the date of the encounter doing chart review, history and exam, documentation and further activities per the note       BMI:   Estimated body mass index is 29.54 kg/m  as calculated from the following:    Height as of this encounter: 1.886 m (6' 2.25\").    Weight " as of this encounter: 105.1 kg (231 lb 9.6 oz).       Return in about 6 months (around 10/1/2021) for A Diabetes Check.    LATHA Machuca CNP [facility] JADIEL Rangel is a 65 year old who presents for the following health issues     HPI     Chief Complaint   Patient presents with     Recheck Medication     pt is not fasting       Diabetes Follow-up      How often are you checking your blood sugar? Not at all    What concerns do you have today about your diabetes? None     Do you have any of these symptoms? (Select all that apply)  No numbness or tingling in feet.  No redness, sores or blisters on feet.  No complaints of excessive thirst.  No reports of blurry vision.  No significant changes to weight.    Have you had a diabetic eye exam in the last 12 months? No      Hyperlipidemia Follow-Up      Are you regularly taking any medication or supplement to lower your cholesterol?   Yes- simvastatin 40 mg    Are you having muscle aches or other side effects that you think could be caused by your cholesterol lowering medication?  No    Hypertension Follow-up      Do you check your blood pressure regularly outside of the clinic? No     Are you following a low salt diet? No    Are your blood pressures ever more than 140 on the top number (systolic) OR more   than 90 on the bottom number (diastolic), for example 140/90? Not checking    BP Readings from Last 2 Encounters:   04/01/21 130/68   11/06/19 120/70     Hemoglobin A1C (%)   Date Value   04/01/2021 11.5 (H)   11/06/2019 9.1 (H)     LDL Cholesterol Calculated (mg/dL)   Date Value   05/09/2019 67   03/29/2018 78         How many servings of fruits and vegetables do you eat daily?  0-1    On average, how many sweetened beverages do you drink each day (Examples: soda, juice, sweet tea, etc.  Do NOT count diet or artificially sweetened beverages)?   0    How many days per week do you exercise enough to make your heart beat faster?  "7    How many minutes a day do you exercise enough to make your heart beat faster? 60 or more    How many days per week do you miss taking your medication? 0      Here today for diabetic check.  Does not check blood sugars at home.  Hates being diabetic.  Takes Glucovance in the morning.  When takes it if does not eat will feel very dizzy and shaky.  Does not check blood sugar when is feeling that way.  Has no intention of checking blood sugar.  Walked out of work in December.  May 1 will officially be retired and transitioning to Medicare.  Thinks diet has been better since stopped working.  Is not getting mammograms and Snickers bars from the cafeteria anymore.  Activity has not increased.  Feet continue to be numb.  Only looks at feet to put socks on in the morning.  Has not had eye exam, declines wanting to go.  Has only been taking 3 Glucovance.    Declines colonoscopy  Declines Covid vaccine  Declines shingles vaccine      Review of Systems   Constitutional: Negative for fatigue.   HENT: Negative for congestion, ear pain, rhinorrhea, sinus pressure and sore throat.    Respiratory: Negative for cough, shortness of breath and wheezing.    Cardiovascular: Negative for chest pain and palpitations.   Gastrointestinal: Negative for abdominal pain, constipation and diarrhea.   Genitourinary: Negative for dysuria and frequency.   Musculoskeletal: Negative for arthralgias and joint swelling.   Skin: Negative for rash.   Neurological: Negative for dizziness, light-headedness, numbness and headaches.        Get shaky if does not eat with medications   Psychiatric/Behavioral: Negative for dysphoric mood and sleep disturbance. The patient is not nervous/anxious.           Objective    /68 (BP Location: Left arm, Patient Position: Sitting, Cuff Size: Adult Large)   Pulse 72   Temp 97.6  F (36.4  C) (Tympanic)   Resp 12   Ht 1.886 m (6' 2.25\")   Wt 105.1 kg (231 lb 9.6 oz)   BMI 29.54 kg/m    Body mass index is " 29.54 kg/m .  Physical Exam  Constitutional:       Appearance: He is well-developed.   HENT:      Right Ear: Tympanic membrane and external ear normal.      Left Ear: Tympanic membrane and external ear normal.      Mouth/Throat:      Pharynx: Uvula midline.   Neck:      Thyroid: No thyromegaly.      Vascular: No carotid bruit.   Cardiovascular:      Rate and Rhythm: Normal rate and regular rhythm.      Heart sounds: Normal heart sounds.   Pulmonary:      Effort: Pulmonary effort is normal.      Breath sounds: Normal breath sounds.   Abdominal:      General: Bowel sounds are normal.      Palpations: Abdomen is soft.   Skin:     General: Skin is warm and dry.   Neurological:      Mental Status: He is alert.      Diabetic foot exam completed:  Monofilmaent-declines  DP, TP 2+ bilat  No open wounds or sores bilat

## 2021-05-24 ENCOUNTER — HOSPITAL ENCOUNTER (EMERGENCY)
Facility: CLINIC | Age: 66
Discharge: HOME | End: 2021-05-24
Attending: FAMILY MEDICINE | Admitting: FAMILY MEDICINE
Payer: COMMERCIAL

## 2021-05-24 VITALS
SYSTOLIC BLOOD PRESSURE: 162 MMHG | WEIGHT: 228 LBS | HEIGHT: 75 IN | BODY MASS INDEX: 28.35 KG/M2 | OXYGEN SATURATION: 98 % | DIASTOLIC BLOOD PRESSURE: 84 MMHG | RESPIRATION RATE: 18 BRPM | HEART RATE: 86 BPM | TEMPERATURE: 98 F

## 2021-05-24 DIAGNOSIS — L03.119 CELLULITIS IN DIABETIC FOOT (H): ICD-10-CM

## 2021-05-24 DIAGNOSIS — E11.628 CELLULITIS IN DIABETIC FOOT (H): ICD-10-CM

## 2021-05-24 PROCEDURE — 99283 EMERGENCY DEPT VISIT LOW MDM: CPT | Performed by: FAMILY MEDICINE

## 2021-05-24 PROCEDURE — 250N000013 HC RX MED GY IP 250 OP 250 PS 637: Performed by: FAMILY MEDICINE

## 2021-05-24 PROCEDURE — 99284 EMERGENCY DEPT VISIT MOD MDM: CPT | Performed by: FAMILY MEDICINE

## 2021-05-24 RX ADMIN — AMOXICILLIN AND CLAVULANATE POTASSIUM 1 TABLET: 875; 125 TABLET, FILM COATED ORAL at 21:45

## 2021-05-24 ASSESSMENT — MIFFLIN-ST. JEOR: SCORE: 1904.83

## 2021-05-25 NOTE — DISCHARGE INSTRUCTIONS
Return to the Emergency Room if the following occurs:     Severely worsened pain, expanding redness, tenderness up the leg, fever greater than 101, or for any concern at anytime.    Or, follow-up with the following provider as we discussed:     Return to your primary doctor as needed, or if not improved over the next 48 hours.    Medications discussed:    Augmentin.  First dose given in the ED.  Start prescription tomorrow morning.    If you received pain-relieving or sedating medication during your time in the ER, avoid alcohol, driving automobiles, or working with machinery.  Also, a responsible adult must stay with you.        Call the Nurse Advice Line at [phone] or [phone] for any concern at anytime.

## 2021-05-25 NOTE — ED NOTES
"Patient here with complaints of a \"popped blister\" to the left ball of the foot. He states it wasn't there yesterday. Blanched area immediately adjacent to the open area. Swelling and redness surround the open area. It is draining serous fluid onto his sock. The wound is the size of a half dollar piece.   "

## 2021-05-25 NOTE — ED PROVIDER NOTES
"  HPI   The patient is a 66-year-old male presenting with concern for infection involving his right foot and leg.  He recognized swelling and anterior leg pain starting this morning.  He also recognized an open wound which he thinks may have been a blister on the sole of his foot, overlying the first MTP joint.  He denies feeling sick or having a fever.  He denies calf pain or tenderness.  He denies chest pain or tenderness.  He tells me that his sense of pain is \"really weird.  I do not feel pain well.\"  He denies foot neuropathy.  He denies a history of open wounds.  He does have diabetes.        Allergies:  Allergies   Allergen Reactions     Anesthetic [Amides] Nausea and Vomiting     Pt. Denies allergy to local anesthetics.  Had an episode of n/v with prior GA in 1981.  Pt. Has had prior amide local anesthetics without problems.     Problem List:    Patient Active Problem List    Diagnosis Date Noted     Chronic kidney disease, stage 3 04/01/2021     Priority: Medium     Diabetic neuropathy associated with other specified diabetes mellitus 06/26/2015     Priority: Medium     Insomnia 03/29/2013     Priority: Medium     Torn meniscus 03/29/2013     Priority: Medium     Work related.  Run around and decided not to deal with it anymore.       Hereditary and idiopathic peripheral neuropathy 06/14/2011     Priority: Medium     Possibly diabetic but long-standing.  Aggressive diabetes mellitus treatment.  Problem list name updated by automated process. Provider to review       Hyperlipidemia LDL goal <100 10/31/2010     Priority: Medium     Metatarsalgia 06/02/2008     Priority: Medium     August 4, 2009  - Podiatry.       Hypertension goal BP (blood pressure) < 140/80 06/02/2008     Priority: Medium     6/26/15 - not controlled. Restart medications. Recheck 2-3 weeks.        Impotence of organic origin 11/21/2005     Priority: Medium     June 2, 2008 - Not worth the cost to use anythign at this point.         Past " "Medical History:    No past medical history on file.  Past Surgical History:    Past Surgical History:   Procedure Laterality Date     CL AFF SURGICAL PATHOLOGY  1981    Trauma     HC REMOVAL GALLBLADDER      Cholecystectomy     INCISION AND DRAINAGE FINGER, COMBINED  11/4/2013    Procedure: COMBINED INCISION AND DRAINAGE FINGER;  Incision & Drainage right index finger;  Surgeon: Ferdinand Cisneros MD;  Location: [facility] OR     Family History:    Family History   Problem Relation Age of Onset     Diabetes Mother      Hypertension Brother      C.A.D. Brother         MI age 47     Cerebrovascular Disease No family hx of      Breast Cancer No family hx of      Cancer - colorectal No family hx of      Prostate Cancer No family hx of      Social History:  Marital Status:   [2]  Social History     Tobacco Use     Smoking status: Never Smoker     Smokeless tobacco: Never Used   Substance Use Topics     Alcohol use: Never     Frequency: Never     Drug use: No      Medications:    amoxicillin-clavulanate (AUGMENTIN) 875-125 MG tablet  aspirin 81 MG tablet  BLOOD GLUCOSE TEST STRIPS STRP  glyBURIDE (DIABETA /MICRONASE) 5 MG tablet  lisinopril (ZESTRIL) 20 MG tablet  metFORMIN (GLUCOPHAGE-XR) 500 MG 24 hr tablet  simvastatin (ZOCOR) 40 MG tablet  sitagliptin (JANUVIA) 25 MG tablet      Review of Systems   All other systems reviewed and are negative.      PE   BP: (!) 186/97  Pulse: 88  Temp: 98  F (36.7  C)  Resp: 18  Height: 190.5 cm (6' 3\")  Weight: 103.4 kg (228 lb)  SpO2: 97 %  Physical Exam  Vitals signs and nursing note reviewed.   Constitutional:       General: He is not in acute distress.  HENT:      Head: Atraumatic.      Right Ear: External ear normal.      Left Ear: External ear normal.      Nose: Nose normal.      Mouth/Throat:      Mouth: Mucous membranes are moist.      Pharynx: Oropharynx is clear.   Eyes:      General: No scleral icterus.     Extraocular Movements: Extraocular movements intact.      " Conjunctiva/sclera: Conjunctivae normal.      Pupils: Pupils are equal, round, and reactive to light.   Neck:      Musculoskeletal: Normal range of motion.   Cardiovascular:      Rate and Rhythm: Normal rate.   Pulmonary:      Effort: Pulmonary effort is normal. No respiratory distress.   Musculoskeletal: Normal range of motion.      Comments: No bone tenderness.  Full range of motion of the joints without pain.  No hot, swollen joints.   Skin:     General: Skin is warm and dry.      Comments: The patient has obvious redness surrounding the healing blister that ruptured.  This is on the sole of his left foot overlying the first MTP.  The skin is warm.  The skin is not tender.  He has a swollen foot and distal leg.  He has redness extending up from the foot onto the lower leg.   Neurological:      Mental Status: He is alert and oriented to person, place, and time.   Psychiatric:         Behavior: Behavior normal.         ED COURSE and MDM   0001.  I suspect the patient has foot cellulitis that is extending up to the lower leg.  I will provide Augmentin as he is a diabetic with an open wound on his foot.  He is without a fever.  He does not have tachycardia.  Low concern for sepsis.  Follow-up discussed.  Return for worsening.    LABS  Labs Ordered and Resulted from Time of ED Arrival Up to the Time of Departure from the ED - No data to display    IMAGING  Images reviewed by me.  Radiology report also reviewed.  US Lower Extremity Venous Duplex Left    (Results Pending)       Procedures    Medications   amoxicillin-clavulanate (AUGMENTIN) 875-125 MG per tablet 1 tablet (1 tablet Oral Given 5/24/21 0235)         IMPRESSION       ICD-10-CM    1. Cellulitis in diabetic foot (H)  E11.628     L03.119             Medication List      Started    amoxicillin-clavulanate 875-125 MG tablet  Commonly known as: Augmentin  1 tablet, Oral, 2 TIMES DAILY                          Kendall Wells MD  05/25/21 0001

## 2021-06-17 ENCOUNTER — TELEPHONE (OUTPATIENT)
Dept: FAMILY MEDICINE | Facility: CLINIC | Age: 66
End: 2021-06-17

## 2021-06-17 ENCOUNTER — TELEPHONE (OUTPATIENT)
Dept: EMERGENCY MEDICINE | Facility: CLINIC | Age: 66
End: 2021-06-17

## 2021-06-17 NOTE — TELEPHONE ENCOUNTER
Left message on answering machine to return call. Direct line provided.  Patient will need a follow up appointment with his provider to receive additional antibiotics.  Penny Webster RN

## 2021-06-17 NOTE — TELEPHONE ENCOUNTER
Hi,    The patient is requesting a refill on Augmentin 875-125 mg tablet. Directions were one tablet by mouth twice a day for 10 days. Please verify and send new order if appropriate or let us know if the patient needs to be seen. Thanks!    Perla Meeks, [facility] [place]

## 2021-06-18 ENCOUNTER — OFFICE VISIT (OUTPATIENT)
Dept: FAMILY MEDICINE | Facility: CLINIC | Age: 66
End: 2021-06-18
Payer: COMMERCIAL

## 2021-06-18 ENCOUNTER — TELEPHONE (OUTPATIENT)
Dept: FAMILY MEDICINE | Facility: CLINIC | Age: 66
End: 2021-06-18

## 2021-06-18 VITALS
DIASTOLIC BLOOD PRESSURE: 90 MMHG | HEIGHT: 75 IN | SYSTOLIC BLOOD PRESSURE: 154 MMHG | OXYGEN SATURATION: 98 % | HEART RATE: 89 BPM | TEMPERATURE: 98.4 F | RESPIRATION RATE: 14 BRPM | BODY MASS INDEX: 27.23 KG/M2 | WEIGHT: 219 LBS

## 2021-06-18 DIAGNOSIS — E11.42 DIABETIC POLYNEUROPATHY ASSOCIATED WITH TYPE 2 DIABETES MELLITUS (H): ICD-10-CM

## 2021-06-18 DIAGNOSIS — L97.529 DIABETIC ULCER OF LEFT FOOT ASSOCIATED WITH TYPE 2 DIABETES MELLITUS, UNSPECIFIED PART OF FOOT, UNSPECIFIED ULCER STAGE (H): ICD-10-CM

## 2021-06-18 DIAGNOSIS — E11.628 CELLULITIS IN DIABETIC FOOT (H): Primary | ICD-10-CM

## 2021-06-18 DIAGNOSIS — E11.621 DIABETIC ULCER OF LEFT FOOT ASSOCIATED WITH TYPE 2 DIABETES MELLITUS, UNSPECIFIED PART OF FOOT, UNSPECIFIED ULCER STAGE (H): ICD-10-CM

## 2021-06-18 DIAGNOSIS — L03.119 CELLULITIS IN DIABETIC FOOT (H): Primary | ICD-10-CM

## 2021-06-18 PROCEDURE — 99214 OFFICE O/P EST MOD 30 MIN: CPT | Performed by: PHYSICIAN ASSISTANT

## 2021-06-18 RX ORDER — SULFAMETHOXAZOLE/TRIMETHOPRIM 800-160 MG
1 TABLET ORAL 2 TIMES DAILY
Qty: 14 TABLET | Refills: 0 | Status: SHIPPED | OUTPATIENT
Start: 2021-06-18 | End: 2021-06-25

## 2021-06-18 ASSESSMENT — ENCOUNTER SYMPTOMS
CHILLS: 0
ABDOMINAL PAIN: 0
UNEXPECTED WEIGHT CHANGE: 0
PARESTHESIAS: 1
NERVOUS/ANXIOUS: 0
WOUND: 1
NAUSEA: 0
DIAPHORESIS: 0
SHORTNESS OF BREATH: 0
FEVER: 0

## 2021-06-18 ASSESSMENT — MIFFLIN-ST. JEOR: SCORE: 1864.01

## 2021-06-18 NOTE — TELEPHONE ENCOUNTER
S-(situation): Wound clinic referral received for patient. At earliest, this clinic is currently scheduling patients for week of 7/12/21.    B-(background): Patient referred to wound clinic due to plantar foot diabetic ulcer.    A-(assessment): Per referral, patient in need of topical plan of care to optimize wound healing/prevent infection.    R-(recommendations): Unless patient has been seen by a podiatrist, please refer him to a podiatrist such as Dr. Mcrae at [place] (for both podiatry and topical wound plan of care). Otherwise there is a wound clinic in [place] [facility] with podiatry/wound plan of care as well. Dr. Arteaga is another option and sees patients in [place] but works with this clinic for determining wound plan of cares. If desired, this clinic will contact patient to schedule for mid-July but due to wound type feel he should be seen at an alternate location sooner.    Thank you,  Nelida Whiteside RN, CWOCN  [phone]

## 2021-06-18 NOTE — PROGRESS NOTES
Assessment & Plan     Cellulitis in diabetic foot (H)  Diabetic ulcer of left foot associated with type 2 diabetes mellitus, unspecified part of foot, unspecified ulcer stage (H)  Diabetic polyneuropathy associated with type 2 diabetes mellitus (H)  Patient is a 66-year-old male with past medical history significant for type 2 diabetes and diabetic polyneuropathy. Patient recently evaluated in emergency department and diagnosed with cellulitis of left foot. Patient presents today for follow-up. Vital signs without fever or tachycardia. Physical exam significant for pressure ulcer on plantar aspect of left foot related to diabetes and polyneuropathy with viable tissue present. Patient does have mild surrounding erythema suggesting ongoing cellulitis.    -For treatment of cellulitis patient prescribed extension of Augmentin as well as new prescription of Bactrim.  -Discussed the importance of wound care and management of diabetes related to healing of ulcer. Patient is agreeable to wound care follow-up as well as primary care follow-up. Referral for wound care placed.  Based on wound care RN recommendations, placed referral to podiatry for initial evaluation and wound care recommendations.  -Return precautions provided.    - WOUND CARE REFERRAL; Future  -Podiatry  - amoxicillin-clavulanate (AUGMENTIN) 875-125 MG tablet; Take 1 tablet by mouth 2 times daily for 7 days  - sulfamethoxazole-trimethoprim (BACTRIM DS) 800-160 MG tablet; Take 1 tablet by mouth 2 times daily for 7 days    See Patient Instructions    Return in about 1 week (around 6/25/2021) for Reevaluation.    Nahed Elizabeth PA-C  [facility] JADIEL Rangel is a 66 year old who presents for the following health issues     HPI     ED/UC Followup:    Facility:  [facility]  Date of visit: 5/24/2021  Reason for visit: Leg pain  Current Status: Dx cellulitis of right foot and leg. He is on course of augmentin 875-125mg bid with some  "improvement. PMHx neuropathy. Numbness and tingling is stable. Blistered area is somewhat improving, he notes some redness and swelling. There is some clear drainage. Area is warm to the touch. No fevers.      Per chart review, patient valuated in emergency department May 24, 2021 and diagnosed with cellulitis and diabetic foot which started that morning.  Low concerns for sepsis.  Patient was afebrile and was not tachycardic.  Patient prescribed Augmentin.  Venous ultrasound was completed and noted to be reviewed by ED provider.  Results unavailable for review today.      Review of Systems   Constitutional: Negative for chills, diaphoresis, fever and unexpected weight change.   Respiratory: Negative for shortness of breath.    Cardiovascular: Negative for chest pain.   Gastrointestinal: Negative for abdominal pain and nausea.   Skin: Positive for wound. Negative for rash.   Neurological: Positive for paresthesias (baseline ).   Psychiatric/Behavioral: The patient is not nervous/anxious.             Objective    BP (!) 154/90   Pulse 89   Temp 98.4  F (36.9  C) (Tympanic)   Resp 14   Ht 1.905 m (6' 3\")   Wt 99.3 kg (219 lb)   SpO2 98%   BMI 27.37 kg/m    Body mass index is 27.37 kg/m .  Physical Exam  Vitals signs and nursing note reviewed.   Constitutional:       General: He is not in acute distress.     Appearance: Normal appearance.   HENT:      Head: Normocephalic and atraumatic.   Eyes:      Extraocular Movements: Extraocular movements intact.      Pupils: Pupils are equal, round, and reactive to light.   Neck:      Musculoskeletal: Normal range of motion.   Cardiovascular:      Rate and Rhythm: Normal rate.   Pulmonary:      Effort: Pulmonary effort is normal.   Musculoskeletal: Normal range of motion.      Comments: Plantar aspect, left foot: approximately 1.5 cm x 3 cm ulcer extending to muscle with granulation tissue surrounding.  No eschar formation.  Mild-moderate erythema approximately 3.5 cm " surrounding.  Mild swelling of foot.   Normal range of motion.  Decreased sensation consistent with diabetic neuropathy.   Skin:     General: Skin is warm and dry.   Neurological:      General: No focal deficit present.      Mental Status: He is alert.   Psychiatric:         Mood and Affect: Mood normal.         Behavior: Behavior normal.

## 2021-06-18 NOTE — TELEPHONE ENCOUNTER
Referral placed to podiatry, Dr. Arteaga for initial evaluation and wound care recommendations.  Please notify patient of this change.    Nahed Elizabeth PA-C on 6/18/2021 at 12:48 PM

## 2021-06-18 NOTE — TELEPHONE ENCOUNTER
Diagnoses: Cellulitis in diabetic foot (H)   Diabetic ulcer of left foot associated with type 2 diabetes mellitus, unspecified part of foot, unspecified ulcer stage (H)   Order: Orthopedic  Referral    Jevon Arteaga DPM   [address] [facility] [address]   Phone: [phone]   Fax: [phone]          Notified patient of the orders below per Nahed Elizabeth.  Patient also given above information to schedule appointment.    Also, routed to nurse below.     Nelida Whiteside RN, CWOCN [phone]       Vidhi BSN-RN  Triage Nurse  [facility]: [facility]

## 2021-06-18 NOTE — PATIENT INSTRUCTIONS
Your exam is concerning for a diabetic ulcer with infection.  We are extending your treatment of antibiotics.  You have been prescribed both Augmentin (which you had previously) and Bactrim.  Please take these medications as prescribed and complete the entire course of medications.  Also, I would like you to continue with Neosporin and dressings as you have been doing.    Additionally, we have placed a referral to wound care to treat the diabetic ulcer.  They should contact you within the next 2 business days to schedule your appointments.      I would like you to keep your appointment with Shira underwood this month for follow-up.  Please follow-up sooner with any new or worsening symptoms.    \       Patient Education     Diabetes: Treating Minor Foot Infections   Diabetes makes it harder for the body to heal. Even minor foot problems, like a blister, can become infected. If not treated, infections can spread and damage nearby tissues. You may need treatment in the hospital. Serious infections can result in loss of a foot. Quick care by your healthcare provider can help clear up infections and prevent serious problems.     Get treatment   If your healthcare provider finds a minor foot infection, you ll be started on treatment. The goal is to heal the infected area and prevent spread of the infection.     Your provider will check and clean the infected part.    He or she will measure the length, width, and depth of the infected area. That way, your wound care team can tell if the wound is healing or getting larger from one visit to the next.     Your provider may give you antibiotics to fight the infection. Take your antibiotic as instructed on the bottle. Take all the medicines you are given, even if the sore starts to look better. If you don t, the infection will not go away and may spread.    You may be asked to keep the infected area dry.    You may be told to keep your feet raised or to limit walking. Swelling of  the foot with fluid hampers wound healing.    Follow any instructions you are given about changing bandages or soaking your foot.  Follow-up care   When you have diabetes, a foot infection may take a long time to heal. That may even be the case if you take antibiotics or have other treatments. For best results, be sure to keep all your follow-up visits with your healthcare provider. He or she can then make sure you re healing the right way.   When to call your healthcare provider   Check your feet every day. Use a mirror to look at the bottoms. Call your healthcare provider right away if you have any open or infected areas on your feet. Watch for these signs of an infection:     Soreness    Redness    Warmth    Swelling    Drainage    Also call your healthcare provider if you have any of these:     Corns, calluses, or bunions on your feet    Swelling of feet or legs    An ingrown toenail    Itching or cracking between your toes    Constantly cold feet    Pain or cramps in your legs or feet while walking    Changes in skin color  [employer] last reviewed this educational content on 11/1/2019 2000-2021 [employer]. All rights reserved. This information is not intended as a substitute for professional medical care. Always follow your healthcare professional's instructions.           Patient Education     Diabetic Foot Care  Diabetes can lead to a number of foot problems. People with diabetes have a 3 in 25 to 1 in 4 chance of getting a foot sore during their lifetime. But you can prevent these with a little extra foot care. If your diabetes is not well controlled, it can harm blood vessels. This results in poor blood flow to your feet. When your skin doesn't get enough blood flow, you are more likely to get pressure injuries. These heal slowly.   Diabetes can also damage nerves. This interferes with your ability to feel pain and pressure. When you can t feel your foot normally, it is easy to injure your  skin, bones, and joints without knowing it. For these reasons, diabetes increases the risk for fungal infections, bunions, and pressure injuries. A pressure injury is a sore or break in the skin. With these injuries, the skin seems to have worn away. Deep injuries can lead to bone infection.   Gangrene is the most serious foot problem of diabetes. It usually occurs on the tips of the toes as blackened areas of skin. The black area is dead tissue. In severe cases, gangrene spreads to the entire toe, other toes, and the whole foot. You may need to have your foot, toe, or even leg amputated. Good foot care and blood sugar control can prevent this.   Home care    Wear comfortable, well-fitting shoes.    Wash your feet daily with warm water and mild soap.    After drying, put a moisturizing cream or lotion on the top and bottom of your feet. Don't put lotion between toes.    Check your feet daily for skin breaks, blisters, swelling, or redness. Look between your toes as well. If you can't see the bottoms of your feet, ask someone to look or use a mirror.    Wear cotton socks and change them every day.    Trim toenails carefully, and don't cut your cuticles. Ask your healthcare provider to trim any corns or calluses.    Keep your blood sugar under control with medicines, diet, and activity.    If you smoke and have diabetes, it's very important that you stop. Smoking reduces blood flow to your foot.    Schedule foot exams at least every year, or more often if you have foot problems.    Put your feet up when sitting, wiggle toes, and move ankles to help improve blood flow.  Don't do activities that increase your risk of foot injury:     Don't walk barefoot.    Don't use heating pads or hot water bottles on your feet.    Don't put your foot in a hot tub without first checking the temperature with your hand.  Follow-up care  Follow up with your healthcare provider, or as advised. Take off your shoes and socks before your  appointment starts. This will remind your healthcare provider to check your feet. Report any cut, puncture, scrape, blister, or other injury to your foot. Also report if you have a bunion, hammertoes, ingrown toenail, or pressure injury on your foot.   When to seek medical advice  Call your healthcare provider right away if any of these occur:    Black skin color anywhere on the foot    Open sore with pus draining from the wound    Increasing foot or leg pain    New areas of redness or swelling or tender areas of the foot    Fever of 100.4 F (38 C) or greater, or as advised by your healthcare provider  [employer] last reviewed this educational content on 9/1/2018 2000-2021 [employer]. All rights reserved. This information is not intended as a substitute for professional medical care. Always follow your healthcare professional's instructions.

## 2021-06-24 ENCOUNTER — OFFICE VISIT (OUTPATIENT)
Dept: FAMILY MEDICINE | Facility: CLINIC | Age: 66
End: 2021-06-24
Payer: COMMERCIAL

## 2021-06-24 VITALS
RESPIRATION RATE: 18 BRPM | SYSTOLIC BLOOD PRESSURE: 151 MMHG | DIASTOLIC BLOOD PRESSURE: 82 MMHG | BODY MASS INDEX: 27.47 KG/M2 | OXYGEN SATURATION: 99 % | HEART RATE: 69 BPM | WEIGHT: 219.8 LBS | TEMPERATURE: 97.4 F

## 2021-06-24 DIAGNOSIS — E11.42 DIABETIC POLYNEUROPATHY ASSOCIATED WITH TYPE 2 DIABETES MELLITUS (H): ICD-10-CM

## 2021-06-24 DIAGNOSIS — L03.119 CELLULITIS IN DIABETIC FOOT (H): Primary | ICD-10-CM

## 2021-06-24 DIAGNOSIS — E11.628 CELLULITIS IN DIABETIC FOOT (H): Primary | ICD-10-CM

## 2021-06-24 DIAGNOSIS — E11.621 DIABETIC ULCER OF LEFT FOOT ASSOCIATED WITH TYPE 2 DIABETES MELLITUS, UNSPECIFIED PART OF FOOT, UNSPECIFIED ULCER STAGE (H): ICD-10-CM

## 2021-06-24 DIAGNOSIS — L97.529 DIABETIC ULCER OF LEFT FOOT ASSOCIATED WITH TYPE 2 DIABETES MELLITUS, UNSPECIFIED PART OF FOOT, UNSPECIFIED ULCER STAGE (H): ICD-10-CM

## 2021-06-24 PROCEDURE — 99213 OFFICE O/P EST LOW 20 MIN: CPT | Performed by: PHYSICIAN ASSISTANT

## 2021-06-24 ASSESSMENT — ENCOUNTER SYMPTOMS
PARESTHESIAS: 1
MYALGIAS: 0
WOUND: 1
DIAPHORESIS: 0
ARTHRALGIAS: 0
FEVER: 0

## 2021-06-24 NOTE — PATIENT INSTRUCTIONS
Your ulcer does appear to be healing.  Please continue with bacitracin and dressings as discussed.  Continue antibiotics until completely finished.  Please follow-up with wound care as well as podiatry as scheduled.  Return to clinic for any new or worsening symptoms.      Patient Education   Foot Care Tips for People with Diabetes  1. Take care of your diabetes.  ? Work with your care team to keep your blood glucose as close to your target range as possible.   Your target range is: ____________.  2. Check your feet every day.  ? Look at your bare feet every day for cuts, blisters, red spots and swelling.  ? Use a mirror to check the bottoms of your feet or ask a family member for help if you have trouble seeing.  3. Wash your feet every day.  ? Wash your feet in warm (not hot) water every day. Do not soak your feet.  ? Dry your feet well. Be sure to dry between the toes.  4. Keep the skin soft and smooth.  ? Rub a thin coat of lotion over the tops and bottoms of your feet, but not between your toes. Avoid products that contain alcohol.  5. Smooth corns and calluses gently.  ? If your feet are at low risk for problems, use a pumice stone to smooth corns and calluses.  ? Do not use over-the-counter products or sharp objects on corns or calluses.  6. If you can see and reach your toenails, trim them each week or when needed.  ? Cut toenails when they are soft from washing.  ? Trim toenails straight across, and not too short. Smooth the edges with an emery board.  ? If you cannot see or reach your feet, ask a friend or family member to trim your toenails. You may also visit a foot care specialist or podiatrist (foot doctor).  7. Wear shoes and socks at all times.  ? Never walk barefoot.  ? Wear comfortable shoes that fit well and protect your feet. Wear cotton socks that absorb moisture.  ? Shop for shoes at the end of the day, when your feet are bigger. Ask the salesperson to measure your feet and make sure the shoes  are properly fitted. The shoes should not pinch. Your feet should not slide around inside the shoes.  ? Break in new shoes slowly. Wear them one to two hours each day for the first few weeks.  ? Feel inside your shoes before putting them on each time. Make sure the lining is smooth and there are no objects inside.  ? Check your feet after removing your socks and shoes. If you see any red marks, this is a sign that your shoes don t fit well.  Custom-made shoes  Special shoes can be made to fit softly around sore feet or feet that have changed shape. These special shoes help protect your feet.   Medicare and other health insurance programs may pay for special shoes. Talk with your doctor (or diabetes educator) about how and where to get them.   1. Protect your feet from hot and cold.  ? Wear shoes at the beach or on hot pavement.  ? Wear socks at night if your feet get cold.  ? Use your hand or elbow to test bath water instead of your foot.  ? Do not use hot water bottles, heating pads or electric blankets.  2. Keep the blood flowing to your feet.  ? Put your feet up when sitting.  ? Wiggle your toes and flex and rotate your ankles for 5 minutes, two or three times a day. If you will be sitting in a plane or car, you will need to do this at least once every hour.  ? Do not cross your legs for long periods of time.  ? Do not smoke.  3. Be active every day.  ? Plan your physical activity program with your care team.  4. Check with your health care team.  ? Have your doctor or nurse check your bare feet and find out if you are likely to have serious foot problems. Remember that you may not feel the pain of an injury.  ? Call your care team right away if you find a cut, sore, blister or bruise on your foot that does not begin to heal after one day.  ? Follow their advice about foot care.  ? Ask for a yearly foot exam with a monofilament. This is like a hairbrush with a single bristle. It is used to check the nerves in  your feet.  5. Get started now.  ? Begin taking good care of your feet today.  ? Set a time every day to check your feet.  Adapted from the National Diabetes Education Program, sponsored by the U.S. Department of Health and Human Services  National Institutes of Health and the Centers for Disease Control and Prevention.

## 2021-06-24 NOTE — PROGRESS NOTES
Assessment & Plan     Cellulitis in diabetic foot (H)  Diabetic ulcer of left foot associated with type 2 diabetes mellitus, unspecified part of foot, unspecified ulcer stage (H)  Diabetic polyneuropathy associated with type 2 diabetes mellitus (H)  Patient is a 66-year-old male presents to clinic for follow-up on cellulitis related to diabetic ulcer of left foot.  Patient notes improvement in the erythema and also healing appearance of ulcer.  Patient has podiatry and wound care follow-up scheduled.  Vital signs without fever or tachycardia.  Ulcer does appear to be healing.  There is significantly less erythema, no swelling, and increasing granulation tissue present.  Discussed wound care plan including bacitracin and dressings.  Supplies provided.  Recommended continuing antibiotics until completion.  Return precautions discussed.    See Patient Instructions    Return in about 1 week (around 7/1/2021), or if symptoms worsen or fail to improve.    Nahed Elizabeth PA-C  [facility] JADIEL Rangel is a 66 year old who presents for the following health issues    HPI     Diabetic Foot Ulcer- Follow Up   Unable to get into wound clinic until 7/13.  Was advised to see podiatrist but he doesn't feel this is necessary.   Has continued antibiotic therapy, it will be running out soon- wondering if he should continue.  Noticing improvements but would like to have foot re-evaluated by provider           Review of Systems   Constitutional: Negative for diaphoresis and fever.   Musculoskeletal: Negative for arthralgias and myalgias.   Skin: Positive for wound. Negative for rash.   Neurological: Positive for paresthesias (baseline ).            Objective    BP (!) 151/82   Pulse 69   Temp 97.4  F (36.3  C) (Tympanic)   Resp 18   Wt 99.7 kg (219 lb 12.8 oz)   SpO2 99%   BMI 27.47 kg/m    Body mass index is 27.47 kg/m .  Physical Exam  Vitals signs and nursing note reviewed.   Constitutional:        General: He is not in acute distress.     Appearance: Normal appearance.   HENT:      Head: Normocephalic and atraumatic.   Eyes:      Extraocular Movements: Extraocular movements intact.      Pupils: Pupils are equal, round, and reactive to light.   Neck:      Musculoskeletal: Normal range of motion.   Cardiovascular:      Rate and Rhythm: Normal rate.   Pulmonary:      Effort: Pulmonary effort is normal.   Musculoskeletal: Normal range of motion.   Skin:     General: Skin is warm and dry.      Comments: Plantar aspect, left foot: approximately 1.5 cm x 3 cm ulcer extending to muscle with increased granulation tissue since prior exam.  No eschar formation.  Trace erythema surrounding without induration.  No swelling of foot.   Normal range of motion.  Decreased sensation consistent with diabetic neuropathy.    Neurological:      General: No focal deficit present.      Mental Status: He is alert.   Psychiatric:         Mood and Affect: Mood normal.         Behavior: Behavior normal.

## 2021-07-13 ENCOUNTER — HOSPITAL ENCOUNTER (OUTPATIENT)
Dept: WOUND CARE | Facility: CLINIC | Age: 66
Discharge: HOME | End: 2021-07-13
Attending: NURSE PRACTITIONER | Admitting: NURSE PRACTITIONER
Payer: COMMERCIAL

## 2021-07-13 DIAGNOSIS — L97.529 DIABETIC ULCER OF LEFT FOOT ASSOCIATED WITH TYPE 2 DIABETES MELLITUS, UNSPECIFIED PART OF FOOT, UNSPECIFIED ULCER STAGE (H): ICD-10-CM

## 2021-07-13 DIAGNOSIS — L03.119 CELLULITIS IN DIABETIC FOOT (H): ICD-10-CM

## 2021-07-13 DIAGNOSIS — E11.628 CELLULITIS IN DIABETIC FOOT (H): ICD-10-CM

## 2021-07-13 DIAGNOSIS — E11.621 DIABETIC ULCER OF LEFT FOOT ASSOCIATED WITH TYPE 2 DIABETES MELLITUS, UNSPECIFIED PART OF FOOT, UNSPECIFIED ULCER STAGE (H): ICD-10-CM

## 2021-07-13 PROCEDURE — G0463 HOSPITAL OUTPT CLINIC VISIT: HCPCS | Mod: 25

## 2021-07-13 PROCEDURE — 11055 PARING/CUTG B9 HYPRKER LES 1: CPT | Mod: GZ

## 2021-07-15 NOTE — PROGRESS NOTES
"Reason For Visit: Juan Carlos Rizo, 66 year old  here for evaluation and treatment for right foot wound.  Referred by Nahed Elizabeth PA-C.    Pertainent History: Pt noted \"blister\" in area with swelling of foot and leg and redness so presented to the ER on 5/24 and was diagnosed with cellulitis.  Has been followed in clinic by Nahed Elizabeth PA-C and referred to wound care.  Pt states just recently wound has started to improve and is now \"1/3rd\" the size that it was.      Pt with history significant for poorly controlled DM II with HgbA1C of 11.5 on 4/1/21. Since this reading his diabetic medications were adjusted and he is due to return to clinic for follow-up in October.    He has significant neuropathy of his feet.            Personal/social history: Retired but is an active person normally    Objective:  Current topical plan of care: bandaide and antibiotic ointment in place  Last bandage change: this morning    Wound #: Left plantar foot 1st met head        2.5L x 0.9W x flush to 0.1cmD  Tunneling: no  Undermining: no  Wound bed type/amount: granular  Wound Edges: callus and some free/attached epithelial edge  Periwound: maceration  Drainage amount/type:  Moderate serousanguinous  Odor: no  Stage/tissue depth: full thickness  Pain: no    Mobility: independant  Current offloading/footwear: regular athletic shoes with no offloading in place, ambulatory but states he is trying not to walk a lot  Sensation: very limited, neuropathy  HgbA1C: 11.5 Date: 4/1/21  Checks Blood Glucose?:  No, states he used to and it was always about the same, (mid 200's) no matter when he checked so he didn't see the point    DP Pulse  palpable: yes, strong   PT Pulse  palpable: yes, strong   Hair growth: noted good growth foot and leg  Capillary Refill: <2 seconds  Feet/toes color: pink  Feet/toes temperature: warm  Edema: no  Nails: WNL    Other callusing/areas of concern: no    Diet: states he struggles.  Is aware of what he needs to do as " "has received  in the past but admits that if sweets are there he will eat them and eat a lot.  States he  Has managed to lose 12 lbs since April.  Tries to be active.    Smoking: no      Assessment:   Left foot Neuropathic ulcer which appears to be granulating well though is in need of improved offloading and appropriate wound cares, improved glucose control    Pt learning needs: offloading importance and options, blood glucose control importance for healing and prevention of long term complications      Plan:  Periwound callus conservatively shaved with #15 blade, no bleeding.      Time spent discussing BG control and importance in healing as well as prevention of microvascular complications that lead to problems with circulation, renal issues, eye issues, etc. Pt had stated that he \"feels fine\" with high BG.  Discussed how feeling fine is not in correlation with other long term devastating affects he may not notice until too late.  Discussed dietary control with specific suggestions to not have sweets on hand, purchase in small amounts occasionally to satisfy cravings, etc.        Topical Care: Will begin Mepilex Ag foam for cushioniong and absorption, zinc barrier cream to periwound.  Medipore tape to secure.    Offloading: He states he is going to limit weight bearing as much as able.  Advised why this area is getting a of pressure even with normal walking and why this has a negative impact on healing.  Pt states he understands.  Discussed options such as DH walker shoe, knee scooter.  He is not wanting this at this time.  He is also not interested in diabetic shoe fitting, states he has had in the past and he saw no difference.  He had \"Erick shoes\" with inserts made and thinks they were better.  Plans to look into this again. Discussed long term control and directed him to [email or web address] site where he can explore various metatarsal pads and offloading cushions for purchase.      Pt did have an " appointment with Dr. Arteaga podiatrist coming up but states he plans to cancel this and will do this as needed if not healing.          The following discharge recommendations were provided to pt and reviewed:  Left Foot Bandage Change  Wash your hands before and after the dressing change. You may wear gloves if you choose. Gloves are available over the counter at [facility], [facility], [facility], etc.   Use scissors at home that you can designate solely for wound care and cleanse well with each use, (rubbing alcohol or alcohol pads work well for this).    Wash hands.  Remove old dressing.  Wash hands.  1.) Cleanse wound with spray wound cleanser and wipe gently with gauze to dry. If there is some zinc cream remaining around the wound that you can't get off that's ok, just apply more over this, scrubbing may be harmful to new skin forming  2.) Protect surrounding skin with layer of zinc cream (critic aid paste provided)   3.) Apply Silver foam (Mepilex Ag,) sticky side down to wound, secure with Medipore tape  Change dressing daily    Follow up 2 weeks    Continue work on blood glucose control.    Limit weight bearing as much as possible.    Look at [employer] for examples of padding you can use to protect the balls of your feet (for long term prevention).    Signs and symptoms of infection:  Bright green drainage  Foul odor  Increasing pain in area  New redness or swelling at the site of the wound or streaks up from wound  New warmth to touch around wound accompanied by redness and swelling  Fever    Need to be seen as soon as possible for infection concerns.          Return visit: 2 weeks    Approximately 60 spent face to face  Procedure: callus pared x's one    Osiris Sarah RN, CWOCN

## 2021-09-18 DIAGNOSIS — E11.42 TYPE 2 DIABETES MELLITUS WITH POLYNEUROPATHY (H): ICD-10-CM

## 2021-09-20 RX ORDER — METFORMIN HCL 500 MG
2000 TABLET, EXTENDED RELEASE 24 HR ORAL
Qty: 360 TABLET | Refills: 0 | Status: SHIPPED | OUTPATIENT
Start: 2021-09-20 | End: 2021-12-13

## 2021-10-25 DIAGNOSIS — E11.42 TYPE 2 DIABETES MELLITUS WITH POLYNEUROPATHY (H): Primary | ICD-10-CM

## 2021-10-26 NOTE — PROGRESS NOTES
"  Assessment & Plan     Type 2 diabetes mellitus with polyneuropathy (H)  A1c reviewed.  Declines wanting to start additional medication or injectable medication.  Encouraged to be more consistent in taking glyburide.  We will plan office appointment again in 3 months.  Reports does not need medications filled at this time.  - Extra Tube  - Hemoglobin A1c    Hyperlipidemia LDL goal <100  Most recent lipids reviewed.  Tolerating statin well.  Plan to follow-up in 1 year or sooner if needed  - Lipid panel reflex to direct LDL Non-fasting; Future  - Lipid panel reflex to direct LDL Non-fasting    Hypertension goal BP (blood pressure) < 140/80  Stable-blood pressure well controlled today in clinic.  Declines needing medications refilled today.  Plan to follow-up in 1 year or sooner if needed  - Basic metabolic panel; Future  - Basic metabolic panel    Review of the result(s) of each unique test - A1c, lipid, urine microalbumin, BMP  Ordering of each unique test  Prescription drug management  42 minutes spent on the date of the encounter doing chart review, history and exam, documentation and further activities per the note     BMI:   Estimated body mass index is 28.2 kg/m  as calculated from the following:    Height as of 6/18/21: 1.905 m (6' 3\").    Weight as of this encounter: 102.3 kg (225 lb 9.6 oz).       Return in about 3 months (around 1/27/2022) for A Diabetes Check.    LATHA Machuca CNP  [facility] JADIEL Rangel is a 66 year old who presents for the following health issues     HPI     Chief Complaint   Patient presents with     Recheck Medication     pt is not fasting       Diabetes Follow-up      How often are you checking your blood sugar? Not at all    What concerns do you have today about your diabetes? None     Do you have any of these symptoms? (Select all that apply)  No numbness or tingling in feet.  No redness, sores or blisters on feet.  No complaints of " excessive thirst.  No reports of blurry vision.  No significant changes to weight.    Have you had a diabetic eye exam in the last 12 months? No                Hyperlipidemia Follow-Up      Are you regularly taking any medication or supplement to lower your cholesterol?   Yes- simvastatin 40 mg    Are you having muscle aches or other side effects that you think could be caused by your cholesterol lowering medication?  No    Hypertension Follow-up      Do you check your blood pressure regularly outside of the clinic? No     Are you following a low salt diet? No    Are your blood pressures ever more than 140 on the top number (systolic) OR more   than 90 on the bottom number (diastolic), for example 140/90? Not checking    BP Readings from Last 2 Encounters:   10/27/21 (!) 148/70   06/24/21 (!) 151/82     Hemoglobin A1C (%)   Date Value   10/27/2021 11.2 (H)   04/01/2021 11.5 (H)   11/06/2019 9.1 (H)     LDL Cholesterol Calculated (mg/dL)   Date Value   04/01/2021 83   05/09/2019 67         How many servings of fruits and vegetables do you eat daily?  2-3    On average, how many sweetened beverages do you drink each day (Examples: soda, juice, sweet tea, etc.  Do NOT count diet or artificially sweetened beverages)?   0    How many days per week do you exercise enough to make your heart beat faster? 7    How many minutes a day do you exercise enough to make your heart beat faster? 60 or more  How many days per week do you miss taking your medication? 5  What makes it hard for you to take your medications?  remembering to take glyburide with breakfast, he seldom eats breakfast        Here today for diabetic follow up.  Is not fasting today for labs.  Does not check blood sugars or blood pressures at home.  Declines wanting to do so.  Did increase Metformin to 2000 total milligrams per day.  When was taking 2000 mg felt nauseated and overall felt poorly.  Is currently taking 1500 mg/day.  Only remembers to take glyburide  about 1 time per week.  Normally, does not eat breakfast. Stopped ice cream, bread, candy bars.    Wound to foot is completely healed.      Review of Systems   Constitutional: Negative for fatigue.   HENT: Negative for congestion, ear pain, rhinorrhea, sinus pressure and sore throat.    Respiratory: Negative for cough, shortness of breath and wheezing.    Cardiovascular: Negative for chest pain and palpitations.   Gastrointestinal: Negative for abdominal pain, constipation and diarrhea.   Genitourinary: Negative for dysuria and frequency.   Musculoskeletal: Negative for arthralgias and joint swelling.   Skin: Negative for rash.   Neurological: Negative for dizziness, light-headedness, numbness and headaches.   Psychiatric/Behavioral: Negative for dysphoric mood and sleep disturbance. The patient is not nervous/anxious.           Objective    BP (!) 148/70 (BP Location: Right arm, Patient Position: Sitting, Cuff Size: Adult Large)   Pulse 80   Temp 97.4  F (36.3  C) (Tympanic)   Resp 20   Wt 102.3 kg (225 lb 9.6 oz)   BMI 28.20 kg/m    Body mass index is 28.2 kg/m .  Physical Exam  Constitutional:       Appearance: He is well-developed.   HENT:      Right Ear: Tympanic membrane and external ear normal.      Left Ear: Tympanic membrane and external ear normal.      Mouth/Throat:      Pharynx: Uvula midline.   Neck:      Thyroid: No thyromegaly.      Vascular: No carotid bruit.   Cardiovascular:      Rate and Rhythm: Normal rate and regular rhythm.      Heart sounds: Normal heart sounds.   Pulmonary:      Effort: Pulmonary effort is normal.      Breath sounds: Normal breath sounds.   Abdominal:      General: Bowel sounds are normal.      Palpations: Abdomen is soft.   Musculoskeletal:        Feet:    Skin:     General: Skin is warm and dry.   Neurological:      Mental Status: He is alert.

## 2021-10-27 ENCOUNTER — OFFICE VISIT (OUTPATIENT)
Dept: FAMILY MEDICINE | Facility: CLINIC | Age: 66
End: 2021-10-27
Payer: COMMERCIAL

## 2021-10-27 VITALS
BODY MASS INDEX: 28.2 KG/M2 | TEMPERATURE: 97.4 F | RESPIRATION RATE: 20 BRPM | HEART RATE: 80 BPM | WEIGHT: 225.6 LBS | SYSTOLIC BLOOD PRESSURE: 138 MMHG | DIASTOLIC BLOOD PRESSURE: 72 MMHG

## 2021-10-27 DIAGNOSIS — E78.5 HYPERLIPIDEMIA LDL GOAL <100: Primary | ICD-10-CM

## 2021-10-27 DIAGNOSIS — E11.42 TYPE 2 DIABETES MELLITUS WITH POLYNEUROPATHY (H): ICD-10-CM

## 2021-10-27 DIAGNOSIS — I10 HYPERTENSION GOAL BP (BLOOD PRESSURE) < 140/80: ICD-10-CM

## 2021-10-27 LAB
ANION GAP SERPL CALCULATED.3IONS-SCNC: 3 MMOL/L (ref 3–14)
BUN SERPL-MCNC: 26 MG/DL (ref 7–30)
CALCIUM SERPL-MCNC: 9.6 MG/DL (ref 8.5–10.1)
CHLORIDE BLD-SCNC: 107 MMOL/L (ref 94–109)
CHOLEST SERPL-MCNC: 140 MG/DL
CO2 SERPL-SCNC: 28 MMOL/L (ref 20–32)
CREAT SERPL-MCNC: 1.45 MG/DL (ref 0.66–1.25)
GFR SERPL CREATININE-BSD FRML MDRD: 50 ML/MIN/1.73M2
GLUCOSE BLD-MCNC: 181 MG/DL (ref 70–99)
HBA1C MFR BLD: 11.2 % (ref 0–5.6)
HDLC SERPL-MCNC: 50 MG/DL
HOLD SPECIMEN: NORMAL
HOLD SPECIMEN: NORMAL
LDLC SERPL CALC-MCNC: 60 MG/DL
NONHDLC SERPL-MCNC: 90 MG/DL
POTASSIUM BLD-SCNC: 4.8 MMOL/L (ref 3.4–5.3)
SODIUM SERPL-SCNC: 138 MMOL/L (ref 133–144)
TRIGL SERPL-MCNC: 152 MG/DL

## 2021-10-27 PROCEDURE — 80061 LIPID PANEL: CPT | Performed by: NURSE PRACTITIONER

## 2021-10-27 PROCEDURE — 36415 COLL VENOUS BLD VENIPUNCTURE: CPT | Performed by: NURSE PRACTITIONER

## 2021-10-27 PROCEDURE — 99214 OFFICE O/P EST MOD 30 MIN: CPT | Performed by: NURSE PRACTITIONER

## 2021-10-27 PROCEDURE — 83036 HEMOGLOBIN GLYCOSYLATED A1C: CPT | Performed by: NURSE PRACTITIONER

## 2021-10-27 PROCEDURE — 80048 BASIC METABOLIC PNL TOTAL CA: CPT | Performed by: NURSE PRACTITIONER

## 2021-10-27 ASSESSMENT — ENCOUNTER SYMPTOMS
RHINORRHEA: 0
ABDOMINAL PAIN: 0
DIZZINESS: 0
LIGHT-HEADEDNESS: 0
SLEEP DISTURBANCE: 0
DYSURIA: 0
SORE THROAT: 0
NUMBNESS: 0
CONSTIPATION: 0
HEADACHES: 0
DYSPHORIC MOOD: 0
PALPITATIONS: 0
NERVOUS/ANXIOUS: 0
FREQUENCY: 0
JOINT SWELLING: 0
WHEEZING: 0
SHORTNESS OF BREATH: 0
FATIGUE: 0
ARTHRALGIAS: 0
DIARRHEA: 0
SINUS PRESSURE: 0
COUGH: 0

## 2021-10-27 ASSESSMENT — PAIN SCALES - GENERAL: PAINLEVEL: NO PAIN (0)

## 2022-01-12 ENCOUNTER — TELEPHONE (OUTPATIENT)
Dept: FAMILY MEDICINE | Facility: CLINIC | Age: 67
End: 2022-01-12
Payer: COMMERCIAL

## 2022-01-12 NOTE — TELEPHONE ENCOUNTER
"Wife sent my chart message stating they both have \"been down with assumed covid\"  Patient had sore throat, headache which has resolved.  No fever.  Today patient has diarrhea and vomiting.  No consent to speak with spouse.  Elda Eli RN  [facility]      "

## 2022-01-12 NOTE — TELEPHONE ENCOUNTER
FRANCIS ONLY  Spoke with patient, symptoms started 1/5/22.  He reports sore throat and mild dry cough, these have resolved.  No fever no breathing issues  He does complain of fatigue and decreased appetite.    He is unsure if he was exposed to covid, he is unvaccinated.  Did discuss vaccine, all questions answered regarding vaccine.    Today he had an episode of diarrhea and vomiting.  He reports he drank a bottle of cold water down very fast this am, then he did vomit right after.  No further vomiting.  He believes the episode of diarrhea was from taking too much ibuprofen and tylenol pm over the past few days.  Discussed to take these medication only as directed, he voiced agreement.    Discussed viral illnesses, covid and GI symptoms.  Instructed to treat symptoms per protocol, discussed easy foods (bland diet), push fluids, if food intake low, make sure fluids contain calories. Discussed watching for signs of hypogycemia and treatment  He denies any bloody stools, no nausea, no bloating or abdominal pain.  Appt scheduled for telephone visit tomorrow.    Patient knows to go to ER is symptoms change or worsen before appt  Elda Eli RN  [facility]

## 2022-01-13 ENCOUNTER — VIRTUAL VISIT (OUTPATIENT)
Dept: FAMILY MEDICINE | Facility: CLINIC | Age: 67
End: 2022-01-13
Payer: COMMERCIAL

## 2022-01-13 ENCOUNTER — TELEPHONE (OUTPATIENT)
Dept: LAB | Facility: CLINIC | Age: 67
End: 2022-01-13

## 2022-01-13 DIAGNOSIS — R19.7 DIARRHEA, UNSPECIFIED TYPE: ICD-10-CM

## 2022-01-13 DIAGNOSIS — R11.2 NON-INTRACTABLE VOMITING WITH NAUSEA, UNSPECIFIED VOMITING TYPE: ICD-10-CM

## 2022-01-13 DIAGNOSIS — Z11.52 ENCOUNTER FOR SCREENING LABORATORY TESTING FOR COVID-19 VIRUS: Primary | ICD-10-CM

## 2022-01-13 DIAGNOSIS — J06.9 VIRAL URI WITH COUGH: ICD-10-CM

## 2022-01-13 PROCEDURE — 99213 OFFICE O/P EST LOW 20 MIN: CPT | Mod: 95 | Performed by: PHYSICIAN ASSISTANT

## 2022-01-13 RX ORDER — ONDANSETRON 4 MG/1
4 TABLET, ORALLY DISINTEGRATING ORAL EVERY 12 HOURS PRN
Qty: 10 TABLET | Refills: 0 | Status: SHIPPED | OUTPATIENT
Start: 2022-01-13 | End: 2022-06-13

## 2022-01-13 NOTE — TELEPHONE ENCOUNTER
Prior Authorization Retail Medication Request    Medication/Dose: Ondansetron ODT 4mg  ICD code (if different than what is on RX):  R112  Previously Tried and Failed:    Rationale:      Insurance Name:  [facility] Part [facility]  Insurance ID:  [id number]      Pharmacy Information (if different than what is on RX)  Name:  [facility]  Phone:  [phone]

## 2022-01-13 NOTE — TELEPHONE ENCOUNTER
Central Prior Authorization Team   Phone: [phone]    PA Initiation    Medication: Ondansetron Requires Prior Authorization  Insurance Company: [employer] - Phone [phone] Fax [phone]  Pharmacy Filling the Rx: [facility] - [id number] [facility] [place]  Filling Pharmacy Phone: [phone]  Filling Pharmacy Fax:    Start Date: 1/13/2022

## 2022-01-13 NOTE — PROGRESS NOTES
Juan Carlos is a 66 year old who is being evaluated via a billable telephone visit.      What phone number would you like to be contacted at? [phone]  How would you like to obtain your AVS? Mail a copy    Assessment & Plan     Encounter for screening laboratory testing for COVID-19 virus  Viral URI with cough  Diarrhea, unspecified type  Low suspicion for severe respiratory distress as patient is able to speak in full sentences.  Symptoms are concerning for COVID-19 or other URI.  COVID-19 PCR pending.  Discussed symptomatic treatment with Tylenol, Robitussin/Mucinex, rest, and hydration.  Zofran prescribed for nausea.  Discussed symptoms that warrant emergent follow-up.    - Symptomatic; Yes; 1/6/2022 COVID-19 Virus (Coronavirus) by PCR Nose; Future  -Zofran     See Patient Instructions    Return in about 1 week (around 1/20/2022), or if symptoms worsen or fail to improve.    [facility] JADIEL Rangel is a 66 year old who presents for the following health issues     HPI     Acute Illness  Acute illness concerns: Symptoms started with sore throat one week ago and progressed to a dry cough.  Diarrhea and emesis started 2-3 days ago.   Onset/Duration: 1 week  Symptoms:  Fever: no  Chills/Sweats: no  Headache (location?): no  Sinus Pressure: no  Conjunctivitis:  no  Ear Pain: no  Rhinorrhea: no  Congestion: no  Sore Throat: no  Cough: YES-non-productive  Wheeze: no  Decreased Appetite: YES  Nausea: no  Vomiting: YES-around once daily after eating/drinking too much   Diarrhea: YES-a few times per day at most   Dysuria/Freq.: no  Dysuria or Hematuria: no  Fatigue/Achiness: YES  Sick/Strep Exposure: YES- Wife with similar sx  Therapies tried and outcome: None          Objective           Vitals:  No vitals were obtained today due to virtual visit.    Physical Exam   healthy, alert and no distress  PSYCH: Alert and oriented times 3; coherent speech, normal   rate and  volume, able to articulate logical thoughts, able   to abstract reason, no tangential thoughts, no hallucinations   or delusions  His affect is normal  RESP: No cough, no audible wheezing, able to talk in full sentences  Remainder of exam unable to be completed due to telephone visits          Phone call duration: 14 minutes

## 2022-01-13 NOTE — PATIENT INSTRUCTIONS
Your symptoms are concerning for COVID-19 or other viral illness.  A COVID-19 test has been ordered for you.  You will be contacted within 24 hours to schedule your test.  After completing the test, results should be available within 1-2 days and will be sent to your MyChart.  You may use Tylenol for fever and pain management and Robitussin-DM/Mucinex DM for cough and congestion.  Make sure to get plenty of rest and stay hydrated.     For nausea and vomiting, you have been prescribed Zofran. Only use this if needed.      If you have severe symptoms such as chest pain, wheezing, shortness of breath, or fevers that you cannot control, please go to the emergency department.  It is important that you follow self-isolation guidelines as listed below.    Please reach out with any questions or concerns.  Take care,  Nahed Elizabeth PA-C      Patient Education     Viral Upper Respiratory Illness (Adult)    You have a viral upper respiratory illness (URI), which is another term for the common cold. This illness is contagious during the first few days. It is spread through the air by coughing and sneezing. It may also be spread by direct contact (touching the sick person and then touching your own eyes, nose, or mouth). Frequent handwashing will decrease risk of spread. Most viral illnesses go away within 7 to 10 days with rest and simple home remedies. Sometimes the illness may last for several weeks. Antibiotics will not kill a virus, and they are generally not prescribed for this condition.  Home care    If symptoms are severe, rest at home for the first 2 to 3 days. When you resume activity, don't let yourself get too tired.    Don't smoke. If you need help stopping, talk with your healthcare provider.    Avoid being exposed to cigarette smoke (yours or others ).    You may use acetaminophen or ibuprofen to control pain and fever, unless another medicine was prescribed. If you have chronic liver or kidney disease, have ever  had a stomach ulcer or gastrointestinal bleeding, or are taking blood-thinning medicines, talk with your healthcare provider before using these medicines. Aspirin should never be given to anyone under 18 years of age who is ill with a viral infection or fever. It may cause severe liver or brain damage.    Your appetite may be poor, so a light diet is fine. Stay well hydrated by drinking 6 to 8 glasses of fluids per day (water, soft drinks, juices, tea, or soup). Extra fluids will help loosen secretions in the nose and lungs.    Over-the-counter cold medicines will not shorten the length of time you re sick, but they may be helpful for the following symptoms: cough, sore throat, and nasal and sinus congestion. If you take prescription medicines, ask your healthcare provider or pharmacist which over-the-counter medicines are safe to use. (Note: Don't use decongestants if you have high blood pressure.)  Follow-up care  Follow up with your healthcare provider, or as advised.  When to seek medical advice  Call your healthcare provider right away if any of these occur:    Cough with lots of colored sputum (mucus)    Severe headache; face, neck, or ear pain    Difficulty swallowing due to throat pain    Fever of 100.4 F (38 C) or higher, or as directed by your healthcare provider  Call 911  Call 911 if any of these occur:    Chest pain, shortness of breath, wheezing, or difficulty breathing    Coughing up blood    Very severe pain with swallowing, especially if it goes along with a muffled voice   [employer] last reviewed this educational content on 6/1/2018 2000-2021 [employer]. All rights reserved. This information is not intended as a substitute for professional medical care. Always follow your healthcare professional's instructions.           Patient Education       Coronavirus Disease 2019 (COVID-19): Overview  Coronavirus disease 2019 (COVID-19) is an illness that infects the lungs. It's caused by a  type of coronavirus called SARS-CoV-2. There are many types of coronaviruses. They are a very common cause of colds and bronchitis. They can cause a lung infection called pneumonia. Symptoms can range from mild to severe. Some people have no symptoms. These viruses are also found in some animals.   The virus that causes COVID-19 changes (mutates) all the time. This is what all viruses do. It leads to different versions of a virus. These are called variants. COVID-19 variants may spread more easily from person to person. They may cause milder symptoms. Or they may cause more severe symptoms.    The virus spreads and infects people easily. It can infect a person more easily if they are not immune to it. The virus most often spreads through droplets of fluid that a person coughs or sneezes into the air. It may be spread to you if you touch a surface with the virus on it and then touch your eyes, nose, or mouth.      To help prevent spreading the infection, wash your hands often, or use an alcohol-based hand .     For the latest information from the CDC:      Go to the CDC website    Call [phone] ([phone])    What are the symptoms of COVID-19?  Some people have no symptoms. Some have mild symptoms. And other people may have severe symptoms. Types of symptoms can vary from person to person. They may appear 2 to 14 days after contact with the virus. They can include:     Fever    Chills    Coughing    Trouble breathing or feeling short of breath    Sore throat    Stuffy or runny nose    Headache    Body aches    Tiredness    Nausea, vomiting, diarrhea, or abdominal pain    New loss of sense of smell or taste  Check your symptoms with the CDC s Coronavirus Self-.   What are possible complications of COVID-19?  The virus can cause an infection in the lungs. This is called pneumonia. In some cases, this can lead to death. Experts are still learning more about COVID-19 complications. Many other  complications are possible. They include:     Low blood pressure    Kidney failure    Inflammation of the brain or heart    Rashes  Some people are at higher risk for complications. This includes:     Older adults    People with heart or lung disease    People with diabetes or kidney disease    People with health conditions that suppress the immune system    People who take medicines that suppress the immune system  Rarely, a child may have a severe complication. This is called multisystem inflammatory syndrome in children (MIS-C). MIS-C seems to be like Kawasaki disease. This is a rare illness. It causes inflammation of blood vessels and body organs. MIS can also happen in adults. But this is less common.     How is COVID-19 diagnosed?  Your healthcare provider will ask:    What symptoms you have    Where you live    If you ve traveled recently    If you ve had contact with sick people    If you are vaccinated against COVID-19    If you have had COVID-19   You may have 1 of these tests for COVID-19:    Viral (molecular) test. You may also hear this called a PCR or RT-PCR test. Viral tests are very accurate. A viral test looks for the genetic material (RNA) of the SARS-CoV-2 virus. There are a few ways to do this. A swab may be wiped inside your nose or throat. Or a long swab may be put into your nose down to the back of your throat. Or a sample of your saliva may be taken. Your test results may be back in 45 minutes to a few hours. This depends on the type of test. Some tests must be sent to a lab. These can take several days for the results. Test kits you can use at home are now available. Some of these need a prescription. If you use a home kit, follow the instructions in the kit closely. Some kits show results quickly at home. Others must be sent to a lab for the results.    Antigen test. This can find proteins from the SARS-CoV-2 virus. A swab may be wiped inside your nose or throat. Or a long swab may be put  into your nose down to the back of your throat. Some results are back within 15 to 60 minutes. This depends on the type of test. Positive results are very accurate. But false positive results can happen. And the results can be negative even in people with COVID-19. This is more common in places where not many people have the virus. Antigen tests are more likely to miss a COVID-19 infection than a viral (molecular) test. You may need to have a viral test if your antigen test is negative but you have symptoms of COVID-19.  If your provider thinks or confirms that you have COVID-19, you may have other tests. These tests may include:     Antibody blood test. This type of test can show if you had the virus in the past. It shows antibodies for the virus in the blood. The accuracy of these tests varies. And they are not available everywhere. An antibody test may not show if you have an infection right now. This is because it can take up to a few weeks for your body to make antibodies. None of the antibody tests can yet be used to tell if a person is immune to the virus.    Sputum culture. If you have a wet cough, you may be asked to cough up a bit of mucus (sputum) from your lungs. This is tested for the virus. It may be tested for pneumonia.    Imaging tests. You may have a chest X-ray or CT scan.  Can you get COVID-19 again?  Yes, you can get COVID-19 more than once. You may have not gotten immunity. You could have lost the immunity. Or you may get COVID-19 from a different strain (variant) of the virus that you are not immune to. But the COVID-19 vaccine helps people who had COVID-19 lower their risk of having the illness again.   Vaccines for COVID-19  The FDA and CDC advise vaccines to help prevent COVID-19. The vaccines can also make the illness less severe. It can keep you from needing to go to the hospital.  And it can prevent the spread of the virus to other people. No vaccine is 100% effective at preventing an  illness. But getting a vaccine is important. The Pfizer vaccine is available for people as young as age 5. Pregnant or breastfeeding people can have the vaccine. Ask your healthcare provider which vaccine may be best for you.   The vaccines are given as a shot (injection). This is most often done in a muscle in the upper arm. There is a 1-dose vaccine. This is from [employer]. There are 2-dose vaccines. These are from Pfizer and Moderna. For a 2-dose vaccine, the second dose is given several weeks after the first. Some people may need a third dose of Pfizer or Moderna.   Who needs a third dose of Pfizer or Moderna?  A third dose of the Pfizer or Moderna vaccine may be needed for some people. This is for people who have a very weak immune system. The third dose is part of their first (primary) series. It's not a booster. This can happen if you had a solid organ transplant. It can be caused by other conditions or treatments. This third dose is to help a person with a weak immune system build up better protection against the virus. It's given at least 28 days after the second dose. Talk with your healthcare provider about your health risks to see if you need a third dose as part of your primary series.   COVID-19 vaccine booster shots  Everyone age 18 or older can get a COVID-19 booster shot. Here are your options.   Pfizer or Moderna booster  A booster shot of the Pfizer or Moderna vaccines is advised for many people. The booster shot is to be given at least 6 months after your primary series. Talk with your healthcare provider about your situation and risk. The CDC says these people should get a Pfizer or Moderna booster shot:       People age 50 or older    People age 18 or older who live in long-term care facilities    The CDC states people 18 to 49 may choose to get the booster. This depends on their specific case and risk. This includes people with certain health conditions. It also includes people whose  job or living setting puts them at higher risk for COVID-19.   Tyrel & Tyrel booster  A booster shot of the 1-dose Tyrel & Tyrel vaccine is also available. It's advised for people ages 18 and older who got their first dose 2 or more months ago.   Mix and match  You may be able to choose which vaccine you get as a booster. This is called mix and match. Talk with your healthcare provider to learn more.   How is COVID-19 treated?  The most proven treatments right now are those to help your body while it fights the virus. This is known as supportive care. It includes:     Getting rest.This helps your body fight the illness.    Drinking fluids. Try to drink 6 to 8 glasses of fluids every day. Ask your provider which drinks are best for you. Don't have drinks with caffeine or alcohol.    Taking over-the-counter (OTC) medicine.  These are used to help ease pain and reduce fever. Ask your provider which OTC medicine is safe for you to use.  You may need to stay in the hospital for severe illness. Your care may include:     IV (intravenous) fluids. These are given through a vein. This helps to replace fluids in your body.    Oxygen. You may be given supplemental oxygen. Or you may be put on a breathing machine (ventilator). This is done so you get enough oxygen in your body.    Prone positioning. Your healthcare team may regularly turn you on your stomach. This is called prone positioning. It helps increase the amount of oxygen you get to your lungs. Follow their instructions on position changes while you're in the hospital. Also follow their advice on the best positions to help your breathing once you go home.    Remdesivir. This is an antiviral medicine. The FDA has approved it for use on COVID-19. It works by stopping the spread of the SARS-CoV-2 virus in the body. It's approved only for people who are in the hospital. It's for people 12 years and older who weigh at least 88 pounds (40 kgs). In some cases, it may  also be used for people younger than 12 years or who weigh less than 88 pounds (40 kgs).    Steroids or other anti-inflammatory medicines.  These are used to lessen the intense inflammation that some people with COVID-19 can have. The inflammation can lead to more trouble breathing. It can cause other complications or death.    COVID-19 convalescent plasma. Plasma is the liquid part of blood. People who had COVID-19 may be asked to donate plasma. This is called COVID-19 convalescent plasma. The plasma may have antibodies. These can help fight COVID-19 in people who are very ill with it. The FDA has approved it for emergency use in some people with severe but early COVID-19. Ask your provider if you qualify to donate.    Monoclonal antibody therapy. The FDA approved this for emergency use in some people. They must have a positive COVID-19 viral test. They must have mild to moderate symptoms. They can t be in the hospital. It's approved for people 12 years and older. They must weigh at least 88 pounds (40 kgs). And they must be at high risk for severe COVID-19 and a hospital stay. This includes people who are 65 years and older. And it includes people with some chronic conditions. This therapy is not approved for people who are in the hospital with COVID-19 or who need oxygen. Your healthcare team will tell you if you qualify.  Are you at risk for COVID-19?  You are at risk for COVID-19 if any of these apply to you:    You live in an area with cases of COVID-19    You traveled to an area with cases of COVID-19    You had close contact with someone who had COVID-19  Close contact means being within 6 feet.   Keep in mind that COVID-19 may be spread by people who do not show symptoms.   Last updated: 11/22/2021   [employer] last reviewed this educational content on 9/1/2021 2000-2021 [employer]. All rights reserved. This information is not intended as a substitute for professional medical care. Always  follow your healthcare professional's instructions.

## 2022-01-14 NOTE — TELEPHONE ENCOUNTER
PRIOR AUTHORIZATION DENIED    Medication: Ondansetron Requires Prior Authorization    Denial Date: 1/14/2022    Denial Rational: Not covered for diagnosis listed.  Only covered for patients receiving cancer treatment and ondansetron is a full replacement for IV antiemetic within 48 hours of cancer treatment    Covered antiemetics are metoclopramide and prochlorperazine.

## 2022-01-14 NOTE — TELEPHONE ENCOUNTER
Denial Rational: Not covered for diagnosis listed.  Only covered for patients receiving cancer treatment and ondansetron is a full replacement for IV antiemetic within 48 hours of cancer treatment     Covered antiemetics are metoclopramide and prochlorperazine.

## 2022-01-18 NOTE — TELEPHONE ENCOUNTER
Can you please triage patient and see if he still is in need of something for nausea/vomiting? Thank you!    Nahed Elizabeth PA-C on 1/18/2022 at 7:11 AM

## 2022-01-18 NOTE — TELEPHONE ENCOUNTER
Called [phone] (home)     Did patient answer the phone: No, left a message on voicemail to return call to the [facility] at [phone].    Vidhi RN,BSN  Triage Nurse  [facility]: [facility]

## 2022-01-19 NOTE — TELEPHONE ENCOUNTER
Called [phone] (home)     Did patient answer the phone: No, left a message on voicemail to return call to the [facility] at [phone].    Vidhi RN,BSN  Triage Nurse  [facility]: [facility]

## 2022-01-20 NOTE — TELEPHONE ENCOUNTER
Spoke with patient and he stated he is in the hospital in [place] now due to Covid.  Patient stated he became very weak and had collapsed so he was brought into the ER.  Patient stated he does not need med anymore.  Advised patient to schedule follow up with PCP when he is discharged.  Patient verbalized understanding.

## 2022-01-26 ENCOUNTER — VIRTUAL VISIT (OUTPATIENT)
Dept: FAMILY MEDICINE | Facility: CLINIC | Age: 67
End: 2022-01-26
Payer: COMMERCIAL

## 2022-01-26 DIAGNOSIS — U07.1 ACUTE KIDNEY INJURY DUE TO COVID-19 (H): ICD-10-CM

## 2022-01-26 DIAGNOSIS — I10 HYPERTENSION GOAL BP (BLOOD PRESSURE) < 140/80: ICD-10-CM

## 2022-01-26 DIAGNOSIS — Z86.16 HISTORY OF COVID-19: ICD-10-CM

## 2022-01-26 DIAGNOSIS — I48.20 CHRONIC ATRIAL FIBRILLATION (H): ICD-10-CM

## 2022-01-26 DIAGNOSIS — E78.5 HYPERLIPIDEMIA LDL GOAL <100: ICD-10-CM

## 2022-01-26 DIAGNOSIS — E11.42 TYPE 2 DIABETES MELLITUS WITH POLYNEUROPATHY (H): Primary | ICD-10-CM

## 2022-01-26 DIAGNOSIS — N17.9 ACUTE KIDNEY INJURY DUE TO COVID-19 (H): ICD-10-CM

## 2022-01-26 PROCEDURE — 99215 OFFICE O/P EST HI 40 MIN: CPT | Performed by: NURSE PRACTITIONER

## 2022-01-26 RX ORDER — INSULIN GLARGINE 100 [IU]/ML
28 INJECTION, SOLUTION SUBCUTANEOUS 2 TIMES DAILY
Qty: 45 ML | Refills: 0 | Status: SHIPPED | OUTPATIENT
Start: 2022-01-26 | End: 2022-02-05

## 2022-01-26 RX ORDER — METOPROLOL TARTRATE 100 MG
100 TABLET ORAL
COMMUNITY
Start: 2022-01-22 | End: 2022-01-26

## 2022-01-26 RX ORDER — FLASH GLUCOSE SENSOR
1 KIT MISCELLANEOUS
Qty: 6 EACH | Refills: 5 | Status: SHIPPED | OUTPATIENT
Start: 2022-01-26 | End: 2022-05-16

## 2022-01-26 RX ORDER — EMPAGLIFLOZIN 10 MG/1
TABLET, FILM COATED ORAL
COMMUNITY
Start: 2022-01-22 | End: 2022-01-26

## 2022-01-26 RX ORDER — FLASH GLUCOSE SCANNING READER
1 EACH MISCELLANEOUS ONCE
Qty: 1 EACH | Refills: 0 | Status: SHIPPED | OUTPATIENT
Start: 2022-01-26 | End: 2022-01-26

## 2022-01-26 RX ORDER — EMPAGLIFLOZIN 10 MG/1
10 TABLET, FILM COATED ORAL DAILY
Qty: 90 TABLET | Refills: 0 | Status: SHIPPED | OUTPATIENT
Start: 2022-01-26 | End: 2022-02-05

## 2022-01-26 RX ORDER — METOPROLOL TARTRATE 100 MG
100 TABLET ORAL 2 TIMES DAILY
Qty: 180 TABLET | Refills: 3 | Status: SHIPPED | OUTPATIENT
Start: 2022-01-26 | End: 2022-10-14

## 2022-01-26 RX ORDER — SIMVASTATIN 40 MG
TABLET ORAL
Qty: 90 TABLET | Refills: 3 | Status: SHIPPED | OUTPATIENT
Start: 2022-01-26 | End: 2022-10-03

## 2022-01-26 RX ORDER — INSULIN GLARGINE 100 [IU]/ML
28 INJECTION, SOLUTION SUBCUTANEOUS
COMMUNITY
Start: 2022-01-25 | End: 2022-01-26

## 2022-01-26 ASSESSMENT — ENCOUNTER SYMPTOMS
SLEEP DISTURBANCE: 0
DYSURIA: 0
HEADACHES: 0
NUMBNESS: 0
ARTHRALGIAS: 0
WEAKNESS: 1
FREQUENCY: 0
NERVOUS/ANXIOUS: 0
JOINT SWELLING: 0
PALPITATIONS: 0
LIGHT-HEADEDNESS: 0
CONSTIPATION: 0
SORE THROAT: 0
FATIGUE: 1
FEVER: 0
DIZZINESS: 0
WHEEZING: 0
SINUS PRESSURE: 0
ABDOMINAL PAIN: 0
CHILLS: 0
COUGH: 1
RHINORRHEA: 0
SHORTNESS OF BREATH: 1
DIARRHEA: 0
DYSPHORIC MOOD: 0

## 2022-01-26 ASSESSMENT — ANXIETY QUESTIONNAIRES
3. WORRYING TOO MUCH ABOUT DIFFERENT THINGS: NOT AT ALL
4. TROUBLE RELAXING: NOT AT ALL
7. FEELING AFRAID AS IF SOMETHING AWFUL MIGHT HAPPEN: NOT AT ALL
IF YOU CHECKED OFF ANY PROBLEMS ON THIS QUESTIONNAIRE, HOW DIFFICULT HAVE THESE PROBLEMS MADE IT FOR YOU TO DO YOUR WORK, TAKE CARE OF THINGS AT HOME, OR GET ALONG WITH OTHER PEOPLE: NOT DIFFICULT AT ALL
6. BECOMING EASILY ANNOYED OR IRRITABLE: SEVERAL DAYS
1. FEELING NERVOUS, ANXIOUS, OR ON EDGE: NOT AT ALL
2. NOT BEING ABLE TO STOP OR CONTROL WORRYING: NOT AT ALL
GAD7 TOTAL SCORE: 2
5. BEING SO RESTLESS THAT IT IS HARD TO SIT STILL: SEVERAL DAYS

## 2022-01-26 ASSESSMENT — PATIENT HEALTH QUESTIONNAIRE - PHQ9: SUM OF ALL RESPONSES TO PHQ QUESTIONS 1-9: 4

## 2022-01-26 NOTE — PROGRESS NOTES
Juan Carlos is a 66 year old who is being evaluated via a billable telephone visit.      What phone number would you like to be contacted at? [phone]  How would you like to obtain your AVS? Mail a copy    Assessment & Plan     Type 2 diabetes mellitus with polyneuropathy (H)  Blood sugars reviewed.  Prescription sent for freestyle gerald.  Recommend still checking fingerstick blood sugar daily.  We will plan office appointment in 1 month for blood sugar recheck.  Goal is to be able to discontinue insulin and start on Trulicity, Ozempic or Bydureon.  - Continuous Blood Gluc  (FREESTYLE GERALD 14 DAY READER) ZORAIDA; 1 each once for 1 dose Use to read blood sugars as per 's instructions.  - Continuous Blood Gluc Sensor (FREESTYLE GERALD 14 DAY SENSOR) MISC; 1 each every 14 days Change every 14 days.  - insulin glargine (LANTUS SOLOSTAR) 100 UNIT/ML pen; Inject 28 Units Subcutaneous 2 times daily  - JARDIANCE 10 MG TABS tablet; Take 1 tablet (10 mg) by mouth daily    Acute kidney injury due to COVID-19 (H)  We will set up for lab only appointment early next week for recheck of labs.  Consider restarting lisinopril and Metformin when renal function returns to baseline  - Parathyroid Hormone Intact; Future  - Phosphorus; Future  - Albumin Random Urine Quantitative with Creat Ratio; Future  - Basic metabolic panel; Future  - CBC with Platelets & Differential; Future    History of COVID-19  Improving.  We will plan to administer COVID vaccine in clinic in 1 month.    Hyperlipidemia LDL goal <100  Stable-tolerating simvastatin well.  Refills given  - simvastatin (ZOCOR) 40 MG tablet; TAKE ONE TABLET BY MOUTH EVERY NIGHT AT BEDTIME    Chronic atrial fibrillation (H)  Hospital notes reviewed.  Plan to continue Xarelto for at least 3 months.  Encouraged to keep appointment with cardiology  - metoprolol tartrate (LOPRESSOR) 100 MG tablet; Take 1 tablet (100 mg) by mouth 2 times daily  - rivaroxaban ANTICOAGULANT  "(XARELTO) 20 MG TABS tablet; Take 1 tablet (20 mg) by mouth daily    Hypertension goal BP (blood pressure) < 140/80  Stable-tolerating metoprolol well.  Refills given.  - metoprolol tartrate (LOPRESSOR) 100 MG tablet; Take 1 tablet (100 mg) by mouth 2 times daily    Review of external notes as documented elsewhere in note  Review of the result(s) of each unique test - Labs, imaging  Ordering of each unique test  Prescription drug management  45 minutes spent on the date of the encounter doing chart review, history and exam, documentation and further activities per the note     BMI:   Estimated body mass index is 28.2 kg/m  as calculated from the following:    Height as of 6/18/21: 1.905 m (6' 3\").    Weight as of 10/27/21: 102.3 kg (225 lb 9.6 oz).     No follow-ups on file.    LATHA Machuca [facility] JADIEL Rangel is a 66 year old who presents for the following health issues     HPI       Hospital Follow-up Visit:    Hospital/Nursing Home/ Rehab Facility: [facility]  Date of Admission: 1/18/22  Date of Discharge: 1/22/22  Reason(s) for Admission:Viral respiratory illness (Primary Dx);   Atrial fibrillation with RVR (HC);   Metabolic acidosis;   Hyperglycemia;   Acute renal failure superimposed on chronic kidney disease, unspecified CKD stage, unspecified acute renal failure type (HC);   Elevated troponin;   Type 2 diabetes mellitus with other specified complication, with long-term current use of insulin (HC)       Was your hospitalization related to COVID-19? YES   How are you feeling today? Better  In the past 24 hours have you had shortness of breath when speaking, walking, or climbing stairs? I don't have breathing problems  Do you have a cough? Yes, I have a cough but it's not worse  When is the last time you had a fever greater than 100? none  Are you having any other symptoms? Loss of sense of taste or smell, Fatigue, Body aches and Headaches   Do you have " any other stressors you would like to discuss with your provider? No         PHQ Assesment Total Score(s) 1/26/2022   PHQ-9 Score 4   Some recent data might be hidden       BRIAN-7 Results 1/26/2022   BRIAN-7 Total Score 2   Some recent data might be hidden     PTSD Screen Score 1/26/2022   Have you ever experienced this kind of event? No   Some recent data might be hidden       Was the patient in the ICU or did the patient experience delirium during hospitalization?  No          Problems taking medications regularly:  None  Medication changes since discharge: None  Problems adhering to non-medication therapy:  None    Summary of hospitalization:  CareEverywhere information obtained and reviewed  Diagnostic Tests/Treatments reviewed.  Follow up needed: Cardiology, labs  Other Healthcare Providers Involved in Patient s Care:         Specialist appointment - February 6  Update since discharge: improved.       Post Discharge Medication Reconciliation: discharge medications reconciled, continue medications without change.  Plan of care communicated with patient and family                Phone call completed due to COVID 19 outbreak.     Recently inpatient due to pneumonia due to COVID-19, atrial for with RVR, acute kidney injury, hypertension, diabetes mellitus.  Was treated while inpatient with baricitinab and dexamethasone.  Converted to normal sinus rhythm.  Has follow-up appointment scheduled with cardiology on February 6.  Continues on Xarelto-likely will need for 3 total months.  Metformin, glyburide and lisinopril discontinued due to to kidney function.  Started on Humalog sliding scale, Lantus, Jardiance and metoprolol.  Has been checking blood sugars routinely at home.  Feels like they are starting to finally become more controlled.  Is interested in freestyle kar.  Is now agreeable to injectables in the future if needed.  O2 sat 96-97% on RA with ambulation and at rest.  Did call to have oxygen picked up.  Is  having some back pain from being in bed so long.  Is trying to be more active every day.  Cough is gradually improving.  Interested in receiving COVID-19 vaccination.            Review of Systems   Constitutional: Positive for fatigue (improving). Negative for chills and fever.   HENT: Negative for congestion, ear pain, rhinorrhea, sinus pressure and sore throat.    Respiratory: Positive for cough and shortness of breath. Negative for wheezing.    Cardiovascular: Negative for chest pain and palpitations.   Gastrointestinal: Negative for abdominal pain, constipation and diarrhea.   Genitourinary: Negative for dysuria and frequency.   Musculoskeletal: Negative for arthralgias and joint swelling.   Skin: Negative for rash.   Neurological: Positive for weakness. Negative for dizziness, light-headedness, numbness and headaches.   Psychiatric/Behavioral: Negative for dysphoric mood and sleep disturbance. The patient is not nervous/anxious.           Objective           Vitals:  No vitals were obtained today due to virtual visit.    Physical Exam   healthy, alert and no distress  PSYCH: Alert and oriented times 3; coherent speech, normal   rate and volume, able to articulate logical thoughts, able   to abstract reason, no tangential thoughts, no hallucinations   or delusions  His affect is normal and pleasant  RESP: No cough, no audible wheezing, able to talk in full sentences  Remainder of exam unable to be completed due to telephone visits            Phone call duration: 30 minutes

## 2022-01-27 ASSESSMENT — ANXIETY QUESTIONNAIRES: GAD7 TOTAL SCORE: 2

## 2022-02-01 ENCOUNTER — LAB (OUTPATIENT)
Dept: LAB | Facility: CLINIC | Age: 67
End: 2022-02-01
Payer: COMMERCIAL

## 2022-02-01 DIAGNOSIS — U07.1 ACUTE KIDNEY INJURY DUE TO COVID-19 (H): ICD-10-CM

## 2022-02-01 DIAGNOSIS — N17.9 ACUTE KIDNEY INJURY DUE TO COVID-19 (H): ICD-10-CM

## 2022-02-01 LAB
ANION GAP SERPL CALCULATED.3IONS-SCNC: 4 MMOL/L (ref 3–14)
BASOPHILS # BLD AUTO: 0 10E3/UL (ref 0–0.2)
BASOPHILS NFR BLD AUTO: 1 %
BUN SERPL-MCNC: 12 MG/DL (ref 7–30)
CALCIUM SERPL-MCNC: 9.2 MG/DL (ref 8.5–10.1)
CHLORIDE BLD-SCNC: 109 MMOL/L (ref 94–109)
CO2 SERPL-SCNC: 28 MMOL/L (ref 20–32)
CREAT SERPL-MCNC: 1.39 MG/DL (ref 0.66–1.25)
EOSINOPHIL # BLD AUTO: 0.1 10E3/UL (ref 0–0.7)
EOSINOPHIL NFR BLD AUTO: 2 %
ERYTHROCYTE [DISTWIDTH] IN BLOOD BY AUTOMATED COUNT: 13.3 % (ref 10–15)
GFR SERPL CREATININE-BSD FRML MDRD: 56 ML/MIN/1.73M2
GLUCOSE BLD-MCNC: 117 MG/DL (ref 70–99)
HCT VFR BLD AUTO: 38.5 % (ref 40–53)
HGB BLD-MCNC: 12.3 G/DL (ref 13.3–17.7)
LYMPHOCYTES # BLD AUTO: 0.9 10E3/UL (ref 0.8–5.3)
LYMPHOCYTES NFR BLD AUTO: 14 %
MCH RBC QN AUTO: 28.1 PG (ref 26.5–33)
MCHC RBC AUTO-ENTMCNC: 31.9 G/DL (ref 31.5–36.5)
MCV RBC AUTO: 88 FL (ref 78–100)
MONOCYTES # BLD AUTO: 0.5 10E3/UL (ref 0–1.3)
MONOCYTES NFR BLD AUTO: 8 %
NEUTROPHILS # BLD AUTO: 4.8 10E3/UL (ref 1.6–8.3)
NEUTROPHILS NFR BLD AUTO: 76 %
PHOSPHATE SERPL-MCNC: 4.7 MG/DL (ref 2.5–4.5)
PLATELET # BLD AUTO: 223 10E3/UL (ref 150–450)
POTASSIUM BLD-SCNC: 4.1 MMOL/L (ref 3.4–5.3)
PTH-INTACT SERPL-MCNC: 35 PG/ML (ref 18–80)
RBC # BLD AUTO: 4.38 10E6/UL (ref 4.4–5.9)
SODIUM SERPL-SCNC: 141 MMOL/L (ref 133–144)
WBC # BLD AUTO: 6.3 10E3/UL (ref 4–11)

## 2022-02-01 PROCEDURE — 84100 ASSAY OF PHOSPHORUS: CPT

## 2022-02-01 PROCEDURE — 83970 ASSAY OF PARATHORMONE: CPT

## 2022-02-01 PROCEDURE — 36415 COLL VENOUS BLD VENIPUNCTURE: CPT

## 2022-02-01 PROCEDURE — 80048 BASIC METABOLIC PNL TOTAL CA: CPT

## 2022-02-01 PROCEDURE — 85025 COMPLETE CBC W/AUTO DIFF WBC: CPT

## 2022-02-03 ENCOUNTER — TELEPHONE (OUTPATIENT)
Dept: FAMILY MEDICINE | Facility: CLINIC | Age: 67
End: 2022-02-03
Payer: COMMERCIAL

## 2022-02-07 ENCOUNTER — MEDICAL CORRESPONDENCE (OUTPATIENT)
Dept: HEALTH INFORMATION MANAGEMENT | Facility: CLINIC | Age: 67
End: 2022-02-07
Payer: COMMERCIAL

## 2022-02-15 ENCOUNTER — TELEPHONE (OUTPATIENT)
Dept: FAMILY MEDICINE | Facility: CLINIC | Age: 67
End: 2022-02-15
Payer: COMMERCIAL

## 2022-02-15 NOTE — TELEPHONE ENCOUNTER
Patient calling, says he has a visit scheduled with Shira but not until 2/28.    Says he was admitted at [facility] for covid recently, says they stopped his metformin, now on insulin, jardiance.  Reviewed meds, our list is accurate, was reconciled at 1/26 visit with Shira.    Says his cough is way, way, way better but if he takes a deep breath it causes him to cough.   Night time cough is much better.    He had a virtual visit 1/26/22, meds wereupdated.    Says he still has some shortness of breath, says he got checked out last Tuesday by cardiology, sp02 has been 97-98, they said his lungs were clear.    He is talking easily.    He wonders if any of his meds might cause the cough and shortness of breath.   I advised medications can have varied side effects.   More than likely is covid effects yet.   Continue to push fluids, deep breath and cough.   Call or get seen in ER if having rapidly worsening shortness of breath, fever, coughing up green or bloody sputum.    Patient verbalized understanding of and agreement with plan.    Taylor Melendez RN  [facility]

## 2022-02-28 ENCOUNTER — OFFICE VISIT (OUTPATIENT)
Dept: FAMILY MEDICINE | Facility: CLINIC | Age: 67
End: 2022-02-28
Payer: COMMERCIAL

## 2022-02-28 VITALS
OXYGEN SATURATION: 97 % | HEIGHT: 75 IN | DIASTOLIC BLOOD PRESSURE: 68 MMHG | RESPIRATION RATE: 12 BRPM | WEIGHT: 226.25 LBS | BODY MASS INDEX: 28.13 KG/M2 | HEART RATE: 61 BPM | TEMPERATURE: 97.8 F | SYSTOLIC BLOOD PRESSURE: 135 MMHG

## 2022-02-28 DIAGNOSIS — Z86.79 HISTORY OF ATRIAL FIBRILLATION: ICD-10-CM

## 2022-02-28 DIAGNOSIS — N18.31 STAGE 3A CHRONIC KIDNEY DISEASE (H): ICD-10-CM

## 2022-02-28 DIAGNOSIS — E11.42 TYPE 2 DIABETES MELLITUS WITH POLYNEUROPATHY (H): ICD-10-CM

## 2022-02-28 DIAGNOSIS — Z86.16 HISTORY OF COVID-19: ICD-10-CM

## 2022-02-28 DIAGNOSIS — Z12.11 SCREEN FOR COLON CANCER: Primary | ICD-10-CM

## 2022-02-28 DIAGNOSIS — I10 HYPERTENSION GOAL BP (BLOOD PRESSURE) < 140/80: ICD-10-CM

## 2022-02-28 PROBLEM — L97.529 DIABETIC ULCER OF LEFT FOOT ASSOCIATED WITH TYPE 2 DIABETES MELLITUS, UNSPECIFIED PART OF FOOT, UNSPECIFIED ULCER STAGE (H): Status: ACTIVE | Noted: 2022-02-28

## 2022-02-28 PROBLEM — L97.529 DIABETIC ULCER OF LEFT FOOT ASSOCIATED WITH TYPE 2 DIABETES MELLITUS, UNSPECIFIED PART OF FOOT, UNSPECIFIED ULCER STAGE (H): Status: RESOLVED | Noted: 2022-02-28 | Resolved: 2022-02-28

## 2022-02-28 PROBLEM — E11.621 DIABETIC ULCER OF LEFT FOOT ASSOCIATED WITH TYPE 2 DIABETES MELLITUS, UNSPECIFIED PART OF FOOT, UNSPECIFIED ULCER STAGE (H): Status: ACTIVE | Noted: 2022-02-28

## 2022-02-28 PROBLEM — E11.621 DIABETIC ULCER OF LEFT FOOT ASSOCIATED WITH TYPE 2 DIABETES MELLITUS, UNSPECIFIED PART OF FOOT, UNSPECIFIED ULCER STAGE (H): Status: RESOLVED | Noted: 2022-02-28 | Resolved: 2022-02-28

## 2022-02-28 LAB
ANION GAP SERPL CALCULATED.3IONS-SCNC: 4 MMOL/L (ref 3–14)
BUN SERPL-MCNC: 29 MG/DL (ref 7–30)
CALCIUM SERPL-MCNC: 9.5 MG/DL (ref 8.5–10.1)
CHLORIDE BLD-SCNC: 109 MMOL/L (ref 94–109)
CO2 SERPL-SCNC: 26 MMOL/L (ref 20–32)
CREAT SERPL-MCNC: 1.6 MG/DL (ref 0.66–1.25)
GFR SERPL CREATININE-BSD FRML MDRD: 48 ML/MIN/1.73M2
GLUCOSE BLD-MCNC: 192 MG/DL (ref 70–99)
POTASSIUM BLD-SCNC: 5 MMOL/L (ref 3.4–5.3)
SODIUM SERPL-SCNC: 139 MMOL/L (ref 133–144)

## 2022-02-28 PROCEDURE — 99214 OFFICE O/P EST MOD 30 MIN: CPT | Performed by: NURSE PRACTITIONER

## 2022-02-28 PROCEDURE — 36415 COLL VENOUS BLD VENIPUNCTURE: CPT | Performed by: NURSE PRACTITIONER

## 2022-02-28 PROCEDURE — 99207 PR FOOT EXAM NO CHARGE: CPT | Performed by: NURSE PRACTITIONER

## 2022-02-28 PROCEDURE — 80048 BASIC METABOLIC PNL TOTAL CA: CPT | Performed by: NURSE PRACTITIONER

## 2022-02-28 ASSESSMENT — ENCOUNTER SYMPTOMS
NUMBNESS: 0
PALPITATIONS: 0
ABDOMINAL PAIN: 0
LIGHT-HEADEDNESS: 0
WHEEZING: 0
JOINT SWELLING: 0
ARTHRALGIAS: 0
CONSTIPATION: 0
COUGH: 0
SINUS PRESSURE: 0
SHORTNESS OF BREATH: 0
SORE THROAT: 0
SLEEP DISTURBANCE: 0
DYSURIA: 0
HEADACHES: 0
DIARRHEA: 0
RHINORRHEA: 0
DYSPHORIC MOOD: 0
FREQUENCY: 0
NERVOUS/ANXIOUS: 0
FATIGUE: 0
DIZZINESS: 0

## 2022-02-28 ASSESSMENT — PAIN SCALES - GENERAL: PAINLEVEL: NO PAIN (0)

## 2022-02-28 NOTE — PROGRESS NOTES
"  Assessment & Plan     Screen for colon cancer  Declines    Type 2 diabetes mellitus with polyneuropathy (H)  We will recheck renal function here today.  If renal function improved plan to restart lisinopril and Metformin, discontinue insulin and start on GLP-1 such as Trulicity/Ozempic.  Encouraged eye exam  - Basic metabolic panel; Future  - Basic metabolic panel  - FOOT EXAM    History of atrial fibrillation  Most recent cardiology notes reviewed.  Per cardiology-plan to continue Xarelto indefinitely.    Hypertension goal BP (blood pressure) < 140/80  Stable-blood pressure well controlled today in clinic.  Plan to continue current medications and dosages.  We will plan to restart low-dose lisinopril if renal function has improved.    Stage 3a chronic kidney disease (H)  We will recheck renal function here today.    History of COVID-19  Declines COVID vaccine.  Gradually recovering.  Plan to continue to monitor emotional liability.    Review of external notes as documented elsewhere in note  Review of the result(s) of each unique test - Labs, imaging  Ordering of each unique test  Prescription drug management  39 minutes spent on the date of the encounter doing chart review, history and exam, documentation and further activities per the note     BMI:   Estimated body mass index is 28.28 kg/m  as calculated from the following:    Height as of this encounter: 1.905 m (6' 3\").    Weight as of this encounter: 102.6 kg (226 lb 4 oz).       Return in about 3 months (around 5/28/2022) for A Diabetes Check.    LATHA Machuca CNP  [facility] JADIEL Rangel is a 66 year old who presents for the following health issues     HPI       Hospital Follow-up Visit:    Hospital/Nursing Home/ Rehab Facility: [facility]  Date of Admission: 01/18/2022  Date of Discharge: 01/21/2022  Reason(s) for Admission: Covid       Was your hospitalization related to COVID-19? YES   How are you feeling " today? Better  In the past 24 hours have you had shortness of breath when speaking, walking, or climbing stairs? My breathing issues have improved  Do you have a cough? Yes, I have a cough but it's not worse  When is the last time you had a fever greater than 100? NA  Are you having any other symptoms? Loss of sense of taste or smell, Fatigue and Body aches   Do you have any other stressors you would like to discuss with your provider? No         Was the patient in the ICU or did the patient experience delirium during hospitalization?  No  Problems taking medications regularly:  None  Medication changes since discharge: None  Problems adhering to non-medication therapy:  None    Summary of hospitalization:  CareEverywhere information obtained and reviewed  Diagnostic Tests/Treatments reviewed.  Follow up needed: Cardiology  Other Healthcare Providers Involved in Patient s Care:         Specialist appointment - Cardiology  Update since discharge: improved. Post Discharge Medication Reconciliation: discharge medications reconciled, continue medications without change.  Plan of care communicated with patient and family          Here today for follow-up of phone call on January 26, 2022.    At that time-had recently been discharged from hospital after having COVID-19.  Continues to feel very tired and fatigued.  Feels like can only breathe in so much air before will cough.  Is gradually getting better.  Is very emotional.  Will break into tears at the drop of a hat.  This started just prior to being hospitalized.    Has been using freestyle kar to check blood sugars.  Getting 112 2/200.  Just depends when checks blood sugar.  Overall, feels like blood sugar has been much better controlled since getting freestyle kar.  Would like to get off insulin if is able.  Remains off of Metformin and lisinopril due to elevated renal function during hospitalization.  Is checking feet for sores.  Does not currently have  "any.    Had recent follow-up visit with cardiologist.  Was recommended to continue on Xarelto indefinitely due to history of atrial fibrillation.    Review of Systems   Constitutional: Negative for fatigue.   HENT: Negative for congestion, ear pain, rhinorrhea, sinus pressure and sore throat.    Respiratory: Negative for cough, shortness of breath and wheezing.    Cardiovascular: Negative for chest pain and palpitations.   Gastrointestinal: Negative for abdominal pain, constipation and diarrhea.   Genitourinary: Negative for dysuria and frequency.   Musculoskeletal: Negative for arthralgias and joint swelling.   Skin: Negative for rash.   Neurological: Negative for dizziness, light-headedness, numbness and headaches.   Psychiatric/Behavioral: Negative for dysphoric mood and sleep disturbance. The patient is not nervous/anxious.         Emotional           Objective    /68   Pulse 61   Temp 97.8  F (36.6  C) (Tympanic)   Resp 12   Ht 1.905 m (6' 3\")   Wt 102.6 kg (226 lb 4 oz)   SpO2 97%   BMI 28.28 kg/m    Body mass index is 28.28 kg/m .  Physical Exam  Constitutional:       Appearance: He is well-developed.   HENT:      Right Ear: Tympanic membrane and external ear normal.      Left Ear: Tympanic membrane and external ear normal.      Mouth/Throat:      Pharynx: Uvula midline.   Neck:      Thyroid: No thyromegaly.      Vascular: No carotid bruit.   Cardiovascular:      Rate and Rhythm: Normal rate and regular rhythm.      Heart sounds: Normal heart sounds.   Pulmonary:      Effort: Pulmonary effort is normal.      Breath sounds: Normal breath sounds.   Abdominal:      General: Bowel sounds are normal.      Palpations: Abdomen is soft.   Skin:     General: Skin is warm and dry.   Neurological:      Mental Status: He is alert.     Diabetic foot exam completed:  Monofilmaent decreased  DP, TP 2+ bilat  No open wounds or sores bilat           "

## 2022-03-01 ENCOUNTER — TELEPHONE (OUTPATIENT)
Dept: FAMILY MEDICINE | Facility: CLINIC | Age: 67
End: 2022-03-01
Payer: COMMERCIAL

## 2022-03-01 ENCOUNTER — MYC MEDICAL ADVICE (OUTPATIENT)
Dept: FAMILY MEDICINE | Facility: CLINIC | Age: 67
End: 2022-03-01
Payer: COMMERCIAL

## 2022-03-01 DIAGNOSIS — E11.42 TYPE 2 DIABETES MELLITUS WITH POLYNEUROPATHY (H): Primary | ICD-10-CM

## 2022-03-01 RX ORDER — DULAGLUTIDE 0.75 MG/.5ML
0.75 INJECTION, SOLUTION SUBCUTANEOUS
Qty: 1 ML | Refills: 0 | Status: SHIPPED | OUTPATIENT
Start: 2022-03-01 | End: 2022-03-15

## 2022-03-01 RX ORDER — DULAGLUTIDE 3 MG/.5ML
3 INJECTION, SOLUTION SUBCUTANEOUS WEEKLY
Qty: 6 ML | Refills: 0 | Status: SHIPPED | OUTPATIENT
Start: 2022-03-01 | End: 2022-05-16

## 2022-03-01 RX ORDER — DULAGLUTIDE 1.5 MG/.5ML
1.5 INJECTION, SOLUTION SUBCUTANEOUS
Qty: 1 ML | Refills: 0 | Status: SHIPPED | OUTPATIENT
Start: 2022-03-01 | End: 2022-03-15

## 2022-03-01 NOTE — TELEPHONE ENCOUNTER
We will take this verbal and update the prescriptions. Ozempic would be the same cost, and Bydureon is real expensive. Thank you!          Thank you,  Michelle Traylor [facility] [place]  [phone]

## 2022-03-02 ENCOUNTER — TELEPHONE (OUTPATIENT)
Dept: FAMILY MEDICINE | Facility: CLINIC | Age: 67
End: 2022-03-02
Payer: COMMERCIAL

## 2022-03-02 ENCOUNTER — MYC REFILL (OUTPATIENT)
Dept: FAMILY MEDICINE | Facility: CLINIC | Age: 67
End: 2022-03-02
Payer: COMMERCIAL

## 2022-03-02 DIAGNOSIS — E11.42 TYPE 2 DIABETES MELLITUS WITH POLYNEUROPATHY (H): ICD-10-CM

## 2022-03-02 NOTE — TELEPHONE ENCOUNTER
"Wife sent a mychart through her chart saying they cannot access patients mychart, message copied below:    \"Can you MAIL to me new instructions and new meds?? We don't have an active printer and I need hard copy to review new meds. rather than read the dosages and instructions on the meds. Thank you, Clari Rizo   [birth year]   [facility] [address] [place] [address]  [id number]\"    Destiny Fuller RN            "

## 2022-03-05 ENCOUNTER — HEALTH MAINTENANCE LETTER (OUTPATIENT)
Age: 67
End: 2022-03-05

## 2022-03-09 ENCOUNTER — MYC MEDICAL ADVICE (OUTPATIENT)
Dept: FAMILY MEDICINE | Facility: CLINIC | Age: 67
End: 2022-03-09
Payer: COMMERCIAL

## 2022-03-09 NOTE — TELEPHONE ENCOUNTER
Please call and schedule patient for follow up with Shira next week to review the recommendations and answer questions. Ok to use Same Day slot.     Nahed Felix PA-C on 3/9/2022 at 1:05 PM

## 2022-03-11 NOTE — TELEPHONE ENCOUNTER
Left message for patient to return call to Shira's Care Team. Schedule in a same day slot next week per Nahed Felix.

## 2022-04-20 DIAGNOSIS — E11.42 TYPE 2 DIABETES MELLITUS WITH POLYNEUROPATHY (H): Primary | ICD-10-CM

## 2022-04-22 RX ORDER — FLASH GLUCOSE SCANNING READER
EACH MISCELLANEOUS
Qty: 1 EACH | Refills: 0 | Status: SHIPPED | OUTPATIENT
Start: 2022-04-22 | End: 2022-10-03

## 2022-04-28 ENCOUNTER — LAB (OUTPATIENT)
Dept: LAB | Facility: CLINIC | Age: 67
End: 2022-04-28
Payer: COMMERCIAL

## 2022-04-28 DIAGNOSIS — E11.42 TYPE 2 DIABETES MELLITUS WITH POLYNEUROPATHY (H): ICD-10-CM

## 2022-04-28 DIAGNOSIS — N17.9 ACUTE KIDNEY INJURY DUE TO COVID-19 (H): ICD-10-CM

## 2022-04-28 DIAGNOSIS — U07.1 ACUTE KIDNEY INJURY DUE TO COVID-19 (H): ICD-10-CM

## 2022-04-28 LAB
ANION GAP SERPL CALCULATED.3IONS-SCNC: 6 MMOL/L (ref 3–14)
BUN SERPL-MCNC: 28 MG/DL (ref 7–30)
CALCIUM SERPL-MCNC: 9.4 MG/DL (ref 8.5–10.1)
CHLORIDE BLD-SCNC: 107 MMOL/L (ref 94–109)
CO2 SERPL-SCNC: 26 MMOL/L (ref 20–32)
CREAT SERPL-MCNC: 1.55 MG/DL (ref 0.66–1.25)
CREAT UR-MCNC: 62 MG/DL
GFR SERPL CREATININE-BSD FRML MDRD: 49 ML/MIN/1.73M2
GLUCOSE BLD-MCNC: 202 MG/DL (ref 70–99)
HBA1C MFR BLD: 9.1 % (ref 0–5.6)
MICROALBUMIN UR-MCNC: 25 MG/L
MICROALBUMIN/CREAT UR: 40.32 MG/G CR (ref 0–17)
POTASSIUM BLD-SCNC: 4.5 MMOL/L (ref 3.4–5.3)
SODIUM SERPL-SCNC: 139 MMOL/L (ref 133–144)

## 2022-04-28 PROCEDURE — 36415 COLL VENOUS BLD VENIPUNCTURE: CPT

## 2022-04-28 PROCEDURE — 83036 HEMOGLOBIN GLYCOSYLATED A1C: CPT

## 2022-04-28 PROCEDURE — 80048 BASIC METABOLIC PNL TOTAL CA: CPT

## 2022-04-28 PROCEDURE — 82043 UR ALBUMIN QUANTITATIVE: CPT

## 2022-05-01 DIAGNOSIS — N18.31 STAGE 3A CHRONIC KIDNEY DISEASE (H): Primary | ICD-10-CM

## 2022-05-02 ENCOUNTER — MYC REFILL (OUTPATIENT)
Dept: FAMILY MEDICINE | Facility: CLINIC | Age: 67
End: 2022-05-02
Payer: COMMERCIAL

## 2022-05-02 DIAGNOSIS — E11.42 TYPE 2 DIABETES MELLITUS WITH POLYNEUROPATHY (H): ICD-10-CM

## 2022-05-06 DIAGNOSIS — E11.42 TYPE 2 DIABETES MELLITUS WITH POLYNEUROPATHY (H): ICD-10-CM

## 2022-05-06 RX ORDER — INSULIN GLARGINE 100 [IU]/ML
28 INJECTION, SOLUTION SUBCUTANEOUS 2 TIMES DAILY
Qty: 45 ML | Refills: 0 | Status: SHIPPED | OUTPATIENT
Start: 2022-05-06 | End: 2022-05-16

## 2022-05-06 NOTE — TELEPHONE ENCOUNTER
"  Prescription approved per [facility] Refill Protocol.  Requested Prescriptions   Pending Prescriptions Disp Refills     insulin glargine (LANTUS SOLOSTAR) 100 UNIT/ML pen 45 mL 0     Sig: Inject 28 Units Subcutaneous 2 times daily       Long Acting Insulin Protocol Passed - 5/2/2022  8:12 PM        Passed - Serum creatinine on file in past 12 months     Recent Labs   Lab Test 04/28/22 0706   CR 1.55*       Ok to refill medication if creatinine is low          Passed - HgbA1C in past 3 or 6 months     If HgbA1C is 8 or greater, it needs to be on file within the past 3 months.  If less than 8, must be on file within the past 6 months.     Recent Labs   Lab Test 04/28/22 0706   A1C 9.1*             Passed - Medication is active on med list        Passed - Patient is age 18 or older        Passed - Recent (6 mo) or future (30 days) visit within the authorizing provider's specialty     Patient had office visit in the last 6 months or has a visit in the next 30 days with authorizing provider or within the authorizing provider's specialty.  See \"Patient Info\" tab in inbasket, or \"Choose Columns\" in Meds & Orders section of the refill encounter.               empagliflozin (JARDIANCE) 10 MG TABS tablet 90 tablet 0     Sig: Take 1 tablet (10 mg) by mouth daily       Sodium Glucose Co-Transport Inhibitor Agents Passed - 5/2/2022  8:12 PM        Passed - Patient has documented A1c within the specified period of time.     If HgbA1C is 8 or greater, it needs to be on file within the past 3 months.  If less than 8, must be on file within the past 6 months.     Recent Labs   Lab Test 04/28/22 0706   A1C 9.1*             Passed - No creatinine >1.4 or GFR <45 within the past 12 mos     Recent Labs   Lab Test 04/28/22  0706 10/27/21  0724 04/01/21  0803   GFRESTIMATED 49*   < > 62   GFRESTBLACK  --   --  72    < > = values in this interval not displayed.       Recent Labs   Lab Test 04/28/22 0706   CR 1.55*             Passed - " "Medication is active on med list        Passed - Patient is age 18 or older        Passed - Patient has documented normal Potassium within the last 12 mos.     Recent Labs   Lab Test 04/28/22  0706   POTASSIUM 4.5             Passed - Recent (6 mo) or future (30 days) visit within the authorizing provider's specialty     Patient had office visit in the last 6 months or has a visit in the next 30 days with authorizing provider or within the authorizing provider's specialty.  See \"Patient Info\" tab in inbasket, or \"Choose Columns\" in Meds & Orders section of the refill encounter.                 "

## 2022-05-08 RX ORDER — EMPAGLIFLOZIN 10 MG/1
TABLET, FILM COATED ORAL
Qty: 90 TABLET | Refills: 0 | OUTPATIENT
Start: 2022-05-08

## 2022-05-13 NOTE — PROGRESS NOTES
"Juan Carlos is a 67 year old who is being evaluated via a billable telephone visit.      What phone number would you like to be contacted at? [phone]  How would you like to obtain your AVS? Nuria    Assessment & Plan     Type 2 diabetes mellitus with polyneuropathy (H)  Long discussion held with Juan Carlos.  Cost of insulin is going to be over $700 per month.  We will plan to discontinue Lantus insulin and NovoLog insulin.  Education given on use and possible side effects.  Plan to follow-up in early August  - Continuous Blood Gluc Sensor (FREESTYLE GERALD 14 DAY SENSOR) MISC; 1 each every 14 days Change every 14 days.  - dulaglutide (TRULICITY) 0.75 MG/0.5ML pen; Inject 0.75 mg Subcutaneous every 7 days for 4 doses  - dulaglutide (TRULICITY) 1.5 MG/0.5ML pen; Inject 1.5 mg Subcutaneous every 7 days Start after completing 1 month of the 0.75 mg dose.    History of atrial fibrillation  Most recent cardiology notes reviewed.  Continue to follow with cardiology.    Stage 3a chronic kidney disease (H)  Labs reviewed.  Recommend establishing care with nephrology.  Phone number given to call and establish.    Review of external notes as documented elsewhere in note  Review of the result(s) of each unique test - Labs  Prescription drug management  53 minutes spent on the date of the encounter doing chart review, history and exam, documentation and further activities per the note     BMI:   Estimated body mass index is 28.28 kg/m  as calculated from the following:    Height as of 2/28/22: 1.905 m (6' 3\").    Weight as of 2/28/22: 102.6 kg (226 lb 4 oz).       No follow-ups on file.    LATHA Machuca CNP  [facility] JADIEL    Scott Rangel is a 67 year old who presents for the following health issues     HPI     Diabetes Follow-up    How often are you checking your blood sugar? Four or more times daily  Blood sugar testing frequency justification:  Uncontrolled diabetes  What time of day are you " checking your blood sugars (select all that apply)?  Before and after meals  Have you had any blood sugars above 200?  Yes   Have you had any blood sugars below 70?  No    What symptoms do you notice when your blood sugar is low?  Weak    What concerns do you have today about your diabetes? Insulin cost     Do you have any of these symptoms? (Select all that apply)  Weight loss    Have you had a diabetic eye exam in the last 12 months? Yes-unsure of date    Cost of insulin is a concern for him. Prior to starting insulin had decreased dose of Metformin on his own. Prefers sensor over finger pricks for checking blood sugars. Blood sugars running 117-260. Would like to discuss long term insulin injections and restarting Metformin. Lost over 30 pounds after having covid last year. Thinks he has gained it back now.     BP Readings from Last 2 Encounters:   02/28/22 135/68   10/27/21 138/72     Hemoglobin A1C POCT (%)   Date Value   04/01/2021 11.5 (H)   11/06/2019 9.1 (H)     Hemoglobin A1C (%)   Date Value   04/28/2022 9.1 (H)   10/27/2021 11.2 (H)     LDL Cholesterol Calculated (mg/dL)   Date Value   10/27/2021 60   04/01/2021 83   05/09/2019 67                 Phone call completed due to COVID 19 outbreak.     Has been using freestyle kar and likes using it. Eating habits are better. Taste is all back, is not able to taste hamburger yet.  Has decreased the amount of salt that is using-does not taste like it used to prior to having COVID.  Is currently in the donut hole for insurance.  Insulin is going to cost over $700 per month.  Would like to restart metformin.  Has previously been referred to nephrology-has not scheduled yet.    Is very irritated/frustrated with insurance and cardiology regarding testing that is ordered but not covered.      Review of Systems   Constitutional: Negative for fatigue.   HENT: Negative for congestion, ear pain, rhinorrhea, sinus pressure and sore throat.    Respiratory: Negative for  cough, shortness of breath and wheezing.    Cardiovascular: Negative for chest pain and palpitations.   Gastrointestinal: Negative for abdominal pain, constipation and diarrhea.   Genitourinary: Negative for dysuria and frequency.   Musculoskeletal: Negative for arthralgias and joint swelling.   Skin: Negative for rash.   Neurological: Negative for dizziness, light-headedness, numbness and headaches.   Psychiatric/Behavioral: Negative for dysphoric mood and sleep disturbance. The patient is not nervous/anxious.             Objective           Vitals:  No vitals were obtained today due to virtual visit.    Physical Exam   healthy, alert and no distress  PSYCH: Alert and oriented times 3; coherent speech, normal   rate and volume, able to articulate logical thoughts, able   to abstract reason, no tangential thoughts, no hallucinations   or delusions  His affect is normal and pleasant  RESP: No cough, no audible wheezing, able to talk in full sentences  Remainder of exam unable to be completed due to telephone visits          Phone call duration: 38 minutes

## 2022-05-16 ENCOUNTER — VIRTUAL VISIT (OUTPATIENT)
Dept: FAMILY MEDICINE | Facility: CLINIC | Age: 67
End: 2022-05-16
Payer: COMMERCIAL

## 2022-05-16 ENCOUNTER — TELEPHONE (OUTPATIENT)
Dept: FAMILY MEDICINE | Facility: CLINIC | Age: 67
End: 2022-05-16

## 2022-05-16 DIAGNOSIS — E11.42 TYPE 2 DIABETES MELLITUS WITH POLYNEUROPATHY (H): ICD-10-CM

## 2022-05-16 DIAGNOSIS — Z86.79 HISTORY OF ATRIAL FIBRILLATION: Primary | ICD-10-CM

## 2022-05-16 DIAGNOSIS — N18.31 STAGE 3A CHRONIC KIDNEY DISEASE (H): ICD-10-CM

## 2022-05-16 PROCEDURE — 99215 OFFICE O/P EST HI 40 MIN: CPT | Mod: 95 | Performed by: NURSE PRACTITIONER

## 2022-05-16 RX ORDER — DULAGLUTIDE 1.5 MG/.5ML
1.5 INJECTION, SOLUTION SUBCUTANEOUS
Qty: 2 ML | Refills: 1 | Status: SHIPPED | OUTPATIENT
Start: 2022-05-16 | End: 2022-07-28

## 2022-05-16 RX ORDER — DULAGLUTIDE 0.75 MG/.5ML
0.75 INJECTION, SOLUTION SUBCUTANEOUS
Qty: 2 ML | Refills: 0 | Status: CANCELLED | OUTPATIENT
Start: 2022-05-16

## 2022-05-16 RX ORDER — DULAGLUTIDE 0.75 MG/.5ML
0.75 INJECTION, SOLUTION SUBCUTANEOUS
Qty: 2 ML | Refills: 0 | Status: SHIPPED | OUTPATIENT
Start: 2022-05-16 | End: 2022-06-13

## 2022-05-16 RX ORDER — FLASH GLUCOSE SENSOR
1 KIT MISCELLANEOUS
Qty: 6 EACH | Refills: 5 | Status: SHIPPED | OUTPATIENT
Start: 2022-05-16 | End: 2022-10-03

## 2022-05-16 ASSESSMENT — ENCOUNTER SYMPTOMS
SHORTNESS OF BREATH: 0
JOINT SWELLING: 0
ABDOMINAL PAIN: 0
FATIGUE: 0
PALPITATIONS: 0
SINUS PRESSURE: 0
ARTHRALGIAS: 0
FREQUENCY: 0
RHINORRHEA: 0
DYSPHORIC MOOD: 0
DIARRHEA: 0
COUGH: 0
DYSURIA: 0
WHEEZING: 0
HEADACHES: 0
CONSTIPATION: 0
SORE THROAT: 0
NUMBNESS: 0
NERVOUS/ANXIOUS: 0
LIGHT-HEADEDNESS: 0
SLEEP DISTURBANCE: 0
DIZZINESS: 0

## 2022-05-16 NOTE — TELEPHONE ENCOUNTER
Prior Authorization Retail Medication Request    Medication/Dose: Trulicity 0.75mg  ICD code (if different than what is on RX):    Previously Tried and Failed:   Rationale:     Insurance Name:  [facility] part [facility]   Insurance ID:  [id number]       Thank you,  Diane Abbott, [facility] [place]

## 2022-05-16 NOTE — PATIENT INSTRUCTIONS
Stop taking the lantus insulin and the novolog insulin. Start taking the trulicity the next day.      You will take 0.75 mg once a week for 4 weeks.  Then, your dose will increase to 1.5 mg every week.  You may have some nausea, bloating, diarrhea or slight abdominal pain.  These are all normal side effects that typically resolve after a couple of weeks.  If you have any severe abdominal pain please be evaluated urgently.    Remember-it does take the Trulicity a couple of weeks to really start working so, your blood sugars may be more elevated at first.    Continue to take the Jardiance.  Would not recommend restarting metformin at this time due to kidney function.    Please follow-up with nephrology.  You can call [phone] to set up appointment date, time and location.

## 2022-05-17 NOTE — TELEPHONE ENCOUNTER
PA Initiation    Medication: Trulicity 0.75mg  Insurance Company:    Pharmacy Filling the Rx: [facility] - [id number] [facility]  Filling Pharmacy Phone: [phone]  Filling Pharmacy Fax:    Start Date: 5/17/2022

## 2022-05-17 NOTE — TELEPHONE ENCOUNTER
Patient is supposed to start this injection on May 18th. It was covered and now the formulary has changed.

## 2022-05-18 NOTE — TELEPHONE ENCOUNTER
Prior Authorization Approval    Authorization Effective Date: 2/16/2022  Authorization Expiration Date: 5/17/2023  Medication: Trulicity 0.75mg- APPROVED  Approved Dose/Quantity: 2  Reference #:     Insurance Company:    Expected CoPay:       CoPay Card Available:      Foundation Assistance Needed:    Which Pharmacy is filling the prescription (Not needed for infusion/clinic administered): [facility] - [id number] [facility]  Pharmacy Notified: Yes  Patient Notified: YesComment:NO ANSWER, LEFT MSG FOR PT.  PHARMACY WILL NOTIFY WHEN READY

## 2022-05-23 ENCOUNTER — TELEPHONE (OUTPATIENT)
Dept: FAMILY MEDICINE | Facility: CLINIC | Age: 67
End: 2022-05-23
Payer: COMMERCIAL

## 2022-05-23 DIAGNOSIS — Z12.12 ENCOUNTER FOR COLORECTAL CANCER SCREENING: Primary | ICD-10-CM

## 2022-05-23 DIAGNOSIS — Z12.11 ENCOUNTER FOR COLORECTAL CANCER SCREENING: Primary | ICD-10-CM

## 2022-05-23 NOTE — TELEPHONE ENCOUNTER
Patient Quality Outreach    Patient is due for the following:   Colon Cancer Screening -  Colonoscopy    NEXT STEPS:   Schedule Colonoscopy    Type of outreach:    Sent letter.      Questions for provider review:    None     April Ting

## 2022-05-23 NOTE — LETTER
May 23, 2022      Juan Carlos Rizo  [address]  [place] [address]              Dear Juan Carlos,      Your healthcare team cares about your health. To provide you with the best care,   we have reviewed your chart and based on our findings, we see that you are due to:     - COLON CANCER SCREENING:  Call or mychart the clinic to schedule your colonoscopy or schedule/ your FIT Test, or Cologuard test    If you have already completed these items, please contact the clinic via phone or   Mychart so your care team can review and update your records. Thank you for   choosing [facility] for your healthcare needs. For any questions,   concerns, or to schedule an appointment please contact the clinic.       Healthy Regards,      Your [facility] Care Team

## 2022-06-10 DIAGNOSIS — E11.42 TYPE 2 DIABETES MELLITUS WITH POLYNEUROPATHY (H): ICD-10-CM

## 2022-06-11 RX ORDER — EMPAGLIFLOZIN 10 MG/1
TABLET, FILM COATED ORAL
Qty: 90 TABLET | Refills: 0 | OUTPATIENT
Start: 2022-06-11

## 2022-06-13 ENCOUNTER — VIRTUAL VISIT (OUTPATIENT)
Dept: PHARMACY | Facility: CLINIC | Age: 67
End: 2022-06-13
Payer: COMMERCIAL

## 2022-06-13 DIAGNOSIS — I10 HYPERTENSION GOAL BP (BLOOD PRESSURE) < 140/80: ICD-10-CM

## 2022-06-13 DIAGNOSIS — I48.0 PAROXYSMAL A-FIB (H): ICD-10-CM

## 2022-06-13 DIAGNOSIS — E78.5 HYPERLIPIDEMIA LDL GOAL <100: ICD-10-CM

## 2022-06-13 DIAGNOSIS — E11.42 TYPE 2 DIABETES MELLITUS WITH POLYNEUROPATHY (H): Primary | ICD-10-CM

## 2022-06-13 PROCEDURE — 99607 MTMS BY PHARM ADDL 15 MIN: CPT | Performed by: PHARMACIST

## 2022-06-13 PROCEDURE — 99605 MTMS BY PHARM NP 15 MIN: CPT | Performed by: PHARMACIST

## 2022-06-13 NOTE — LETTER
"Recommended To-Do List      Prepared on: 6/13/2022     You can get the best results from your medications by completing the items on this \"To-Do List.\"      Bring your To-Do List when you go to your doctor. And, share it with your family or caregivers.    My To-Do List:  What we talked about: What I should do:    What my medicines are for, how to know if my medicines are working, made sure my medicines are safe for me and reviewed how to take my medicines.    Continue plan to increase your Trulicity dose to 1.5 mg weekly. Depending on what your next A1c is at we can consider some different options for next steps. I contacted your pharmacy to fill the next dose.  Review patient assistance programs paperwork to see if meet any of the criteria for your current medications.                "

## 2022-06-13 NOTE — LETTER
June 13, 2022  Juan Carlosting Mckeonlori  [address]  [place] [address]    Dear PAULY [facility]        Thank you for talking with me on Jun 13, 2022 about your health and medications. As a follow-up to our conversation, I have included two documents:      1. Your Recommended To-Do List has steps you should take to get the best results from your medications.  2. Your Medication List will help you keep track of your medications and how to take them.    If you want to talk about these documents, please call Denis Riavs RPH at phone: [phone], Monday-Friday 8-4:30pm.    I look forward to working with you and your doctors to make sure your medications work well for you.    Sincerely,    Denis Rivas RPH  [facility] Pharmacist, [facility]

## 2022-06-13 NOTE — LETTER
_  Medication List        Prepared on: 6/13/2022     Bring your Medication List when you go to the doctor, hospital, or   emergency room. And, share it with your family or caregivers.     Note any changes to how you take your medications.  Cross out medications when you no longer use them.    Medication How I take it Why I use it Prescriber   Continuous Blood Gluc Sensor (FREESTYLE GERALD 14 DAY SENSOR) MISC Change every 14 days. Type 2 Diabetes  LATHA Whitlock CNP   dulaglutide (TRULICITY) 1.5 MG/0.5ML pen Inject 1.5 mg Subcutaneous every 7 days Start after completing 1 month of the 0.75 mg dose. Type 2 Diabetes  LATHA Whitlock CNP   empagliflozin (JARDIANCE) 10 MG TABS tablet Take 1 tablet (10 mg) by mouth daily Type 2 Diabetes  LATHA Whitlock CNP   metoprolol tartrate (LOPRESSOR) 100 MG tablet Take 1 tablet (100 mg) by mouth 2 times daily Atrial Fibrillation; Blood Pressure LATHA Whitlock CNP   rivaroxaban ANTICOAGULANT (XARELTO) 20 MG TABS tablet Take 1 tablet (20 mg) by mouth daily Atrial Fibrillation; Stroke Prevention LATHA Whitlock CNP   simvastatin (ZOCOR) 40 MG tablet TAKE ONE TABLET BY MOUTH EVERY NIGHT AT BEDTIME Heart; Cholesterol LATHA Whitlock CNP         Add new medications, over-the-counter drugs, herbals, vitamins, or  minerals in the blank rows below.    Medication How I take it Why I use it Prescriber                          Allergies:      anesthetic [amides]        Side effects I have had:               Other Information:              My notes and questions:

## 2022-06-13 NOTE — PROGRESS NOTES
Medication Therapy Management (MTM) Encounter    ASSESSMENT:                            Medication Adherence/Access: Would benefit from seeing if patient qualifies for patient assistance programs.    Type 2 Diabetes: A1c is not at goal of <7%.  Would likely benefit from more time to assess benefit of higher GLP-1 agonist dose. If blood sugars remain elevated could potentially consider low-dose metformin if patient remains CKD stage 3a or alternative sulfonylurea such as glipizide.  Patient may require insulin, but likely would need to evaluate other brand name medications to limit cost.     AFib/Hypertension: Stable. Last blood pressure at goal of <140/90 mmHg. Unclear if 3 months stated in hospital cardiologist notes for anticoagulation was based upon possible causes for AFib though outpatient visit suggests lifelong may be necessary given CHADS2-VASc of 3.     Hyperlipidemia: Stable.    PLAN:                            1. Review patient assistance programs paperwork to see if meet any of the criteria for your current medications.     2. Continue plan to increase your Trulicity dose to 1.5 mg weekly. Depending on what your next A1c is at we can consider some different options for next steps. I contacted your pharmacy to fill the next dose.    3. Future considerations - Trulicity dose? Metformin? Glipizide?     Follow-up: 3 months or sooner if needed    SUBJECTIVE/OBJECTIVE:                          Juan Carlos Rizo is a 67 year old male called for an initial visit. He was referred to me from his [facility] insurance plan.      Reason for visit: Comprehensive medication review.    Allergies/ADRs: Reviewed in chart  Past Medical History: Reviewed in chart  Tobacco: He reports that he has never smoked. He has never used smokeless tobacco.  Alcohol: none    Medication Adherence/Access: Patient states that several of his medications are expensive - $100s per refill - including Trulicity, Jardiance, and Xarelto    Type 2  Diabetes:  Currently taking Trulicity 0.75 mg weekly (plans to increase to 1.5 mg weekly dose this week) and Jardiance 10 mg daily. Patient is not experiencing side effects, but just doesn't feel as good overall as he expected/wants to be.  He previously was on metformin +/- glyburide. Prior to hospital stay in [facility] 2022 he got sick (COVID+) and was not eating. He was only able to tolerate metformin  mg twice daily at the time (prescribed 4 tablets daily, but states it was 3 tablets). Because he was not eating he felt that he could go off of this.  Ended up with very high blood sugars - A1c was 13.5% in the hospital.  Some kidney issues in hospital and decision was made to initial basal-bolus insulin. Sugars came down to 9.1%, but he did not want multiple daily injections and did feel that it was expensive - did not feel better on this and he is not sure if this is due to medications or due to post-COVID symptoms.  Blood sugar monitoring: rarely. Does have personal CGM.   Symptoms of low blood sugar? none  Symptoms of high blood sugar? None known  Eye exam: due  Foot exam: up to date  Aspirin: Not taking due to Xarelto  Statin: Yes: simvastatin   ACEi/ARB: No.   Urine Albumin:   Lab Results   Component Value Date    UMALCR 40.32 (H) 04/28/2022      Lab Results   Component Value Date    A1C 9.1 04/28/2022    A1C 11.2 10/27/2021    A1C 11.5 04/01/2021    A1C 9.1 11/06/2019    A1C 9.8 05/09/2019    A1C 10.5 03/29/2018    A1C 9.4 05/08/2017     GFR Estimate   Date Value Ref Range Status   04/28/2022 49 (L) >60 mL/min/1.73m2 Final     Comment:     Effective December 21, 2021 eGFRcr in adults is calculated using the 2021 CKD-EPI creatinine equation which includes age and gender (Raúl stephens al., NEJM, DOI: 10.1056/ZFYMtr0920331)  This result was previously suppressed from the chart.   04/01/2021 62 >60 mL/min/[1.73_m2] Final     Comment:     Non  GFR Calc  Starting 12/18/2018, serum creatinine based  estimated GFR (eGFR) will be   calculated using the Chronic Kidney Disease Epidemiology Collaboration   (CKD-EPI) equation.       GFR Estimate If Black   Date Value Ref Range Status   2021 72 >60 mL/min/[1.73_m2] Final     Comment:      GFR Calc  Starting 2018, serum creatinine based estimated GFR (eGFR) will be   calculated using the Chronic Kidney Disease Epidemiology Collaboration   (CKD-EPI) equation.         AFib/Hypertension: Current medications include Xarelto 20 mg every evening and metoprolol tartrate 100 mg twice daily.  Patient does not self-monitor blood pressure. He states that he does not quite understand the reason behind why he has to take medication long term.  Saw cardiology through [facility] after he presented to hospital [facility] in AFib. Mentions the cardiologist calculating something in there phone (sounds like CHADS2-VASc score). Dr. Nelsy GONZALEZ, [facility] suggested during [facility] stay that anticoagulation may only be 3 months.  Did want patient to get stress test in 3 months, but patient is concerned about this as he had friend who  apparently as a result of one when the friend was 42 years old. Insurance denied the stress MR that was ordered as well. Patient reports no current medication side effects.  BP Readings from Last 3 Encounters:   22 135/68   10/27/21 138/72   21 (!) 151/82     Hyperlipidemia: Current therapy includes simvastatin 40 mg daily.  Patient reports no significant myalgias or other side effects.  The 10-year ASCVD risk score (Geraldine CLARKE Jr., et al., 2013) is: 24.9%    Values used to calculate the score:      Age: 67 years      Sex: Male      Is Non- : No      Diabetic: Yes      Tobacco smoker: No      Systolic Blood Pressure: 135 mmHg      Is BP treated: Yes      HDL Cholesterol: 50 mg/dL      Total Cholesterol: 140 mg/dL  Recent Labs   Lab Test 10/27/21  0724 21  0803   CHOL 140 178   HDL 50 48    LDL 60 83   TRIG 152* 233*       Today's Vitals: There were no vitals taken for this visit.   Wt Readings from Last 5 Encounters:   02/28/22 226 lb 4 oz (102.6 kg)   10/27/21 225 lb 9.6 oz (102.3 kg)   06/24/21 219 lb 12.8 oz (99.7 kg)   06/18/21 219 lb (99.3 kg)   05/24/21 228 lb (103.4 kg)     ----------------      I spent 41 minutes with this patient today (an extra 15 minutes was spent creating the Medication Action Plan). I offer these suggestions for consideration by LATHA Ellis, CNP. A copy of the visit note was provided to the patient's provider(s).    The patient was mailed a summary of these recommendations.     Robbin Rivas, QasimD, BCACP  Medication Therapy Management Pharmacist  Pager: [phone]    Telemedicine Visit Details  Type of service:  Telephone visit  Start Time: 12:21 PM  End Time: 1:02 PM  Originating Location (patient location): Home  Distant Location (provider location):  [facility]     Medication Therapy Recommendations  No medication therapy recommendations to display

## 2022-06-14 NOTE — PATIENT INSTRUCTIONS
"Recommendations from today's MTM visit:                                                    MTM (medication therapy management) is a service provided by a clinical pharmacist designed to help you get the most of out of your medicines.   Today we reviewed what your medicines are for, how to know if they are working, that your medicines are safe and how to make your medicine regimen as easy as possible.      1. Review patient assistance programs paperwork to see if meet any of the criteria for your current medications.     2. Continue plan to increase your Trulicity dose to 1.5 mg weekly. Depending on what your next A1c is at we can consider some different options for next steps.    3. Future considerations - Trulicity dose? Metformin? Glipizide?     Follow-up: 3 months or sooner if needed    It was great speaking with you today.  I value your experience and would be very thankful for your time in providing feedback in our clinic survey. In the next few days, you may receive an email or text message from [employer] with a link to a survey related to your  clinical pharmacist.\"     To schedule another MTM appointment, please call the clinic directly or you may call the MTM scheduling line at [phone] or toll-free at [phone].     My Clinical Pharmacist's contact information:                                                      Please feel free to contact me with any questions or concerns you have.      Robbin Rivas, PharmD, [facility]  Medication Therapy Management Pharmacist  Pager: [phone]      "

## 2022-07-06 ENCOUNTER — TELEPHONE (OUTPATIENT)
Dept: FAMILY MEDICINE | Facility: CLINIC | Age: 67
End: 2022-07-06

## 2022-07-06 DIAGNOSIS — Z86.79 HISTORY OF ATRIAL FIBRILLATION: Primary | ICD-10-CM

## 2022-07-06 NOTE — TELEPHONE ENCOUNTER
Hello,  Pt was recently hospitalized (due to covid and other compounding issues). Upon discharge, he was started on new meds that are very expensive. The main issues are with Trulicity ($242/month), Jardiance ($152/month) and Xarelto ($138/month). He is wondering if there are any alternative options for him?? And he's specifically asking if he can take aspirin instead of Xarelto and then go back to the diabetic meds that he was one before the hospital stay (metformin and glyburide I believe). He is also getting very frustrated because he has been unable to reach you to talk about these concerns. Could you please call Juan Carlos at [phone] to discuss his options.  Thank you,  Silke Mercado, [facility], [place]  [phone]

## 2022-07-06 NOTE — TELEPHONE ENCOUNTER
Patient was hospitalized 6+ months ago.  I have had a couple of visits with him since then and we have discussed medication affordability.  Due to his kidney function metformin is not an option at this time, I have discussed this with him as well.  At his last appointment with MTPAULY it was recommended that he checks to see if he would qualify for any patient assistance programs.  Do you know if he followed through with that?    Shira Denson NP-C

## 2022-07-08 NOTE — TELEPHONE ENCOUNTER
Hello,  I do not know if he checked into any assistance programs.     On Wed, he was actually trying to talk to someone in the clinic, but he was getting really frustrated because he kept getting transferred to someone at a central answering service. So that is why he ended up calling us in the pharmacy instead. This was my first time speaking with him about these issues. I told him that I would send you message about his concerns and ask you to call him.     I see that he picked up the Trulicity ($241.84) and Xarelto ($27.52), so he has those for another month now. Then yesterday, we filled the Jardiance, and the copay dropped down to $30.38. There is a possibility that he was in the donut hole and is now coming out of it?? But I don't know that for sure. He could call his insurance company to ask questions and verify that.    He really wants to talk to you, so if you could give him a call that would be great.    Thank you,  Silke Mercado, [facility], [place]  [phone]

## 2022-07-11 NOTE — TELEPHONE ENCOUNTER
Patient called back, he says he was hospitalized with covid for 5 days and he says his past meds (he was on them for the past 20 years) were all stopped and he was started on the jardiance and trulicity.    Costs have gone way up with those compared to his past meds.  He says he did check with insurance and the other options provided are all within 17 dollars of what he's currently onl.      He then proceeded to tell me he thinks he needs an inhaler for shortness of breath and fatigue; he also notes the metoprolol drug insert indicates it can cause shortness of breath and fatigue.    He says the shortness of breath and fatigue was pretty debilitating.    I see the plan per the May visit with Shira Denson:         Instructions       Return in about 3 months (around 8/16/2022) for A Diabetes Check.    He says he thinks 100 mg of metoprolol twice a day is too much.    He started taking just 100 mg once daily on Friday.     Says his BP has been around 110/60 on the 100 mg BID.    He says he feels much better on the reduced metoprolol dose.   He checked BP yesterday after being out doing yard work.    BP was 112/63.   Shortness of breath is 100% better since reducing the metoprolol, feeling better each day.    BP Readings from Last 3 Encounters:   02/28/22 135/68   10/27/21 138/72   06/24/21 (!) 151/82     He says he plans to quit taking the metoprolol altogether.   He will monitor his BP.    Says he used to be on lisinopril and wonders if that would be a better option?  He also indicates some skepticism that he has an issue with heart rate (a-fib was diagnosed).    I offered to schedule a visit with Shira to discuss all this.   He says he thinks all he is due for is labs?    I advised we'd likely want to see him, perhaps labs first.    He would like Shira to address the plan.    Routed to Shira Denson to advise.   Of note, I spent 23 minutes on the phone with this patient.    Taylor Melendez RN  [facility]  [facility]

## 2022-07-11 NOTE — TELEPHONE ENCOUNTER
Attempted to call patient at home/mobile number, no answer, left message on voicemail; patient was instructed to return call to [facility] at [phone].    Taylor Melendez RN  [facility]

## 2022-07-11 NOTE — TELEPHONE ENCOUNTER
Shira is out of office, will leave for her to address once she gets back  In the meantime patient could call his insurance to ask questions about coverage, meds, etc

## 2022-07-12 NOTE — TELEPHONE ENCOUNTER
Spoke with patient and wife.  Discussed all below, conversation lasted 40 minutes. Discussed how metoprolol works to control heart rate, general information on A. Fib.  They will try to contact cardiology, to see if they can try a different medication instead of the metoprolol 100 mg BID.  they feel this dose is just too high and is causing the SOB and fatigue.    He reports he has tried decreasing this med to 100 mg once daily the SOB and fatigue improve, or he will do 50 mg bid.    They did not care for the [facility] they saw.  Asking if can have a referral to a cardiologist in [place]?    Also do you have any options of changing Metoprolol to Long acting? Or a different medication that will do the same as Metoprolol, but with out the side effects?      Tried to encourage scheduling an appointment,but patient very frustrated.  Elda Eli RN  [facility]

## 2022-07-12 NOTE — TELEPHONE ENCOUNTER
Needs to continue on the metoprolol-has a history of A. fib.  Metoprolol is helping to control his heart rate and rhythm.  Would recommend he follows up with his cardiologist if he would like to change that medication.    His renal function is elevated-not appropriate to restart metformin and lisinopril.  We have had this discussion multiple times in the past.    If he would like to discuss medication changes he will need an appointment.     Shira MAKC

## 2022-07-13 NOTE — TELEPHONE ENCOUNTER
Patient called [facility] pharmacy with similar questions. He has gone back on metoprolol tartrate 100 mg twice daily.  He did noticed significant improvement in fatigue/breathing on lower dose (100 mg once daily - we did talk about medication is twice daily).  Blood pressure this morning was 106/60 mmHg and pulses have been between 70-80 bpm.  Could consider reduction to metoprolol tartrate 50 mg twice daily or metoprolol  mg daily if appropriate for patient and if his symptoms return.      Robbin Rivas, QasimD, [facility]  Medication Therapy Management Pharmacist  Pager: [phone]

## 2022-07-18 NOTE — TELEPHONE ENCOUNTER
They did not care for the [facility] they saw.  Asking if can have a referral to a cardiologist in [place]?    Destiny Fuller RN

## 2022-07-18 NOTE — TELEPHONE ENCOUNTER
Called patient and informed him the referral has been placed. He verbalized understanding.     Destiny Fuller RN

## 2022-07-27 DIAGNOSIS — E11.42 TYPE 2 DIABETES MELLITUS WITH POLYNEUROPATHY (H): ICD-10-CM

## 2022-07-28 RX ORDER — DULAGLUTIDE 1.5 MG/.5ML
INJECTION, SOLUTION SUBCUTANEOUS
Qty: 2 ML | Refills: 1 | Status: SHIPPED | OUTPATIENT
Start: 2022-07-28 | End: 2022-09-19

## 2022-07-28 NOTE — TELEPHONE ENCOUNTER
Routing refill request to provider for review/approval because:  Failed protocols     Destiny Fuller RN

## 2022-08-04 DIAGNOSIS — E11.42 TYPE 2 DIABETES MELLITUS WITH POLYNEUROPATHY (H): ICD-10-CM

## 2022-08-04 NOTE — TELEPHONE ENCOUNTER
Routing refill request to provider for review/approval because:  Failed protocol.      Destiny Fuller RN

## 2022-08-08 RX ORDER — EMPAGLIFLOZIN 10 MG/1
TABLET, FILM COATED ORAL
Qty: 90 TABLET | Refills: 0 | Status: SHIPPED | OUTPATIENT
Start: 2022-08-08 | End: 2022-10-03

## 2022-08-20 ENCOUNTER — HEALTH MAINTENANCE LETTER (OUTPATIENT)
Age: 67
End: 2022-08-20

## 2022-09-07 NOTE — PROGRESS NOTES
CARDIOLOGY CONSULT    REASON FOR CONSULT: ENE cooper    PRIMARY CARE PHYSICIAN:  Shira Denson    HISTORY OF PRESENT ILLNESS:  67-year-old male seen for atrial fibrillation.  He has diabetes and CKD.    January 2022 he was admitted to [facility] and had A. kenneth.  He had uncontrolled diabetes, COVID, and acute kidney injury at the time.  Xarelto was started.    Echo January 2022 through [facility] showed EF 55%, normal RV, no valve disease.    It took some time to recover from COVID earlier this year, but now he feels back to normal.  He does regular walking and other activities with no shortness of breath.  He does complain of some generalized fatigue.  He has no chest pain or palpitations.  Blood pressure tends to run 130/80 with heart rate in the 60s.    PAST MEDICAL HISTORY:  1.  Paroxysmal atrial fibrillation  2.  Diabetes  3.  CKD    MEDICATIONS:  Current Outpatient Medications   Medication     Continuous Blood Gluc  (FREESTYLE GERALD 14 DAY READER) ZORAIDA     Continuous Blood Gluc Sensor (FREESTYLE GERALD 14 DAY SENSOR) MISC     JARDIANCE 10 MG TABS tablet     metoprolol tartrate (LOPRESSOR) 100 MG tablet     rivaroxaban ANTICOAGULANT (XARELTO) 20 MG TABS tablet     simvastatin (ZOCOR) 40 MG tablet     TRULICITY 1.5 MG/0.5ML pen     No current facility-administered medications for this visit.       ALLERGIES:  Allergies   Allergen Reactions     Anesthetic [Amides] Nausea and Vomiting     Pt. Denies allergy to local anesthetics.  Had an episode of n/v with prior GA in 1981.  Pt. Has had prior amide local anesthetics without problems.       SOCIAL HISTORY:  I have reviewed this patient's social history and updated it with pertinent information if needed. Juan Carlos Rizo  reports that he has never smoked. He has never used smokeless tobacco. He reports that he does not drink alcohol and does not use drugs.    FAMILY HISTORY:  I have reviewed this patient's family history and updated it with pertinent  information if needed.   Family History   Problem Relation Age of Onset     Diabetes Mother      Hypertension Brother      C.A.D. Brother         MI age 47     Cerebrovascular Disease No family hx of      Breast Cancer No family hx of      Cancer - colorectal No family hx of      Prostate Cancer No family hx of        REVIEW OF SYSTEMS:  Constitutional:  No weight loss, fever, chills  HEENT:  Eyes:  No visual loss, blurred vision, double vision or yellow sclerae. No hearing loss, sneezing, congestion, runny nose or sore throat.  Skin:  No rash or itching.  Cardiovascular: per HPI  Respiratory: per HPI  GI:  No anorexia, nausea, vomiting or diarrhea. No abdominal pain or blood.  :  No dysurea, hematuria  Neurologic:  No headache, paralysis, ataxia, numbness or tingling in the extremities. No change in bowel or bladder control.  Musculoskeletal:  No muscle pain  Hematologic:  No bleeding or bruising.  Lymphatics:  No enlarged nodes. No history of splenectomy.  Endocrine:  No reports of sweating, cold or heat intolerance. No polyuria or polydipsia.  Allergies:  No history of asthma, hives, eczema or rhinitis.    PHYSICAL EXAM:  BP (!) 141/85   Pulse 65   Temp 97.1  F (36.2  C) (Tympanic)   Wt 101.2 kg (223 lb)   SpO2 98%   BMI 27.87 kg/m      Constitutional: awake, alert, no distress  Eyes: PERRL, sclera nonicteric  ENT: trachea midline  Respiratory: Lungs clear  Cardiovascular: Regular rate and rhythm, no murmurs  GI: nondistended, nontender, bowel sounds present  Lymph/Hematologic: no lymphadenopathy  Skin: dry, no rash  Musculoskeletal: good muscle tone, strength 5/5 in upper and lower extremities  Neurologic: no focal deficits  Neuropsychiatric: appropriate affact    DATA:  Labs:   Recent Labs   Lab Test 10/27/21  0724 04/01/21  0803   CHOL 140 178   HDL 50 48   LDL 60 83   TRIG 152* 233*     April 2022: Potassium 4.5, creatinine 1.5, A1c 9.1    ASSESSMENT:  67-year-old male seen for atrial fibrillation.  He  has only had 1 documented episode of A. fib from January of this year when he had COVID.  Suspect A. fib was secondary to his acute illness.  He has had no symptoms since then.  Echo was normal.    He will wear a 3-day ZIO monitor.  If there is no A. fib, he will stop Xarelto and can be weaned off his metoprolol, since it may be causing some fatigue.  He likely would need an alternate antihypertensive medication.    RECOMMENDATIONS:  1.  Single episode of A. fib in context of COVID  -3-day ZIO monitor, if no A. fib, then stop Xarelto   - if no A. fib wean off metoprolol, he can take 50 mg twice daily for a week, then 25 mg twice daily for a week, then stop    2.  Hypertension  -If weaned off metoprolol, would likely need to replace with a second agent, would recommend amlodipine     Follow-up with ATTILA in about 6 weeks.    Papa Fish MD  Cardiology - [facility]  Pager:  [phone]  Text Page  September 15, 2022

## 2022-09-09 RX ORDER — DULAGLUTIDE 0.75 MG/.5ML
0.75 INJECTION, SOLUTION SUBCUTANEOUS
Qty: 2 ML | Refills: 0 | Status: CANCELLED | OUTPATIENT
Start: 2022-09-09

## 2022-09-15 ENCOUNTER — OFFICE VISIT (OUTPATIENT)
Dept: CARDIOLOGY | Facility: CLINIC | Age: 67
End: 2022-09-15
Payer: COMMERCIAL

## 2022-09-15 VITALS
BODY MASS INDEX: 27.87 KG/M2 | HEART RATE: 65 BPM | TEMPERATURE: 97.1 F | OXYGEN SATURATION: 98 % | SYSTOLIC BLOOD PRESSURE: 130 MMHG | WEIGHT: 223 LBS | DIASTOLIC BLOOD PRESSURE: 80 MMHG

## 2022-09-15 DIAGNOSIS — Z86.79 HISTORY OF ATRIAL FIBRILLATION: ICD-10-CM

## 2022-09-15 PROCEDURE — 99204 OFFICE O/P NEW MOD 45 MIN: CPT | Performed by: INTERNAL MEDICINE

## 2022-09-15 NOTE — LETTER
9/15/2022    Shira Denson, APRN CNP  [address] [facility] [place] [address]    RE: Juan Carlos Rizo       Dear Colleague,     I had the pleasure of seeing Juan Carlos Rizo in the [facility].  CARDIOLOGY CONSULT    REASON FOR CONSULT: A. fib    PRIMARY CARE PHYSICIAN:  Shira Denson    HISTORY OF PRESENT ILLNESS:  67-year-old male seen for atrial fibrillation.  He has diabetes and CKD.    January 2022 he was admitted to [facility] and had A. fib.  He had uncontrolled diabetes, COVID, and acute kidney injury at the time.  Xarelto was started.    Echo January 2022 through [facility] showed EF 55%, normal RV, no valve disease.    It took some time to recover from COVID earlier this year, but now he feels back to normal.  He does regular walking and other activities with no shortness of breath.  He does complain of some generalized fatigue.  He has no chest pain or palpitations.  Blood pressure tends to run 130/80 with heart rate in the 60s.    PAST MEDICAL HISTORY:  1.  Paroxysmal atrial fibrillation  2.  Diabetes  3.  CKD    MEDICATIONS:  Current Outpatient Medications   Medication     Continuous Blood Gluc  (FREESTYLE GERALD 14 DAY READER) ZORAIDA     Continuous Blood Gluc Sensor (FREESTYLE GERALD 14 DAY SENSOR) MISC     JARDIANCE 10 MG TABS tablet     metoprolol tartrate (LOPRESSOR) 100 MG tablet     rivaroxaban ANTICOAGULANT (XARELTO) 20 MG TABS tablet     simvastatin (ZOCOR) 40 MG tablet     TRULICITY 1.5 MG/0.5ML pen     No current facility-administered medications for this visit.       ALLERGIES:  Allergies   Allergen Reactions     Anesthetic [Amides] Nausea and Vomiting     Pt. Denies allergy to local anesthetics.  Had an episode of n/v with prior GA in 1981.  Pt. Has had prior amide local anesthetics without problems.       SOCIAL HISTORY:  I have reviewed this patient's social history and updated it with pertinent information if needed. Juan Carlos HUFF  Sallie  reports that he has never smoked. He has never used smokeless tobacco. He reports that he does not drink alcohol and does not use drugs.    FAMILY HISTORY:  I have reviewed this patient's family history and updated it with pertinent information if needed.   Family History   Problem Relation Age of Onset     Diabetes Mother      Hypertension Brother      C.A.KRISTIN. Brother         MI age 47     Cerebrovascular Disease No family hx of      Breast Cancer No family hx of      Cancer - colorectal No family hx of      Prostate Cancer No family hx of        REVIEW OF SYSTEMS:  Constitutional:  No weight loss, fever, chills  HEENT:  Eyes:  No visual loss, blurred vision, double vision or yellow sclerae. No hearing loss, sneezing, congestion, runny nose or sore throat.  Skin:  No rash or itching.  Cardiovascular: per HPI  Respiratory: per HPI  GI:  No anorexia, nausea, vomiting or diarrhea. No abdominal pain or blood.  :  No dysurea, hematuria  Neurologic:  No headache, paralysis, ataxia, numbness or tingling in the extremities. No change in bowel or bladder control.  Musculoskeletal:  No muscle pain  Hematologic:  No bleeding or bruising.  Lymphatics:  No enlarged nodes. No history of splenectomy.  Endocrine:  No reports of sweating, cold or heat intolerance. No polyuria or polydipsia.  Allergies:  No history of asthma, hives, eczema or rhinitis.    PHYSICAL EXAM:  BP (!) 141/85   Pulse 65   Temp 97.1  F (36.2  C) (Tympanic)   Wt 101.2 kg (223 lb)   SpO2 98%   BMI 27.87 kg/m      Constitutional: awake, alert, no distress  Eyes: PERRL, sclera nonicteric  ENT: trachea midline  Respiratory: Lungs clear  Cardiovascular: Regular rate and rhythm, no murmurs  GI: nondistended, nontender, bowel sounds present  Lymph/Hematologic: no lymphadenopathy  Skin: dry, no rash  Musculoskeletal: good muscle tone, strength 5/5 in upper and lower extremities  Neurologic: no focal deficits  Neuropsychiatric: appropriate  affact    DATA:  Labs:   Recent Labs   Lab Test 10/27/21  0724 04/01/21  0803   CHOL 140 178   HDL 50 48   LDL 60 83   TRIG 152* 233*     April 2022: Potassium 4.5, creatinine 1.5, A1c 9.1    ASSESSMENT:  67-year-old male seen for atrial fibrillation.  He has only had 1 documented episode of A. fib from January of this year when he had COVID.  Suspect A. fib was secondary to his acute illness.  He has had no symptoms since then.  Echo was normal.    He will wear a 3-day ZIO monitor.  If there is no A. fib, he will stop Xarelto and can be weaned off his metoprolol, since it may be causing some fatigue.  He likely would need an alternate antihypertensive medication.    RECOMMENDATIONS:  1.  Single episode of A. fib in context of COVID  -3-day ZIO monitor, if no A. fib, then stop Xarelto   - if no A. fib wean off metoprolol, he can take 50 mg twice daily for a week, then 25 mg twice daily for a week, then stop    2.  Hypertension  -If weaned off metoprolol, would likely need to replace with a second agent, would recommend amlodipine     Follow-up with ATTILA in about 6 weeks.    Papa Fish MD  Cardiology - [facility]  Pager:  [phone]  Text Page  September 15, 2022    Thank you for allowing me to participate in the care of your patient.      Sincerely,     Papa Fish MD     [facility]  cc:   Shira Denson, APRN CNP  [address] [facility] [place] [id number]

## 2022-09-15 NOTE — PATIENT INSTRUCTIONS
Keep medications the same for now.    Zio patch  Will order a Zio monitor.  This is a heart monitor that continuously records all your heartbeats.  If you have any symptoms, there is a button you can push that records when you had your symptoms.  The monitor is returned to the company in the mail and the report is sent to us.  This will show what your average heart rate is and if there are any rhythm issues with the heart including when you may have reported any symptoms.  You are encouraged to do all your regular activities while wearing this monitor.      If your monitor does not show any afib, we will have you stop the Xarelto and wean off the metoprolol.  You may need a different blood pressure medications once you are off the metoprolol.    We will have you follow up in about 4-6 weeks.

## 2022-09-29 NOTE — PROGRESS NOTES
"SUBJECTIVE:   Juan Carlos is a 67 year old who presents for Preventive Visit.    {Split Bill scripting  The purpose of this visit is to discuss your medical history and prevent health problems before you are sick. You may be responsible for a co-pay, coinsurance, or deductible if your visit today includes services such as checking on a sore throat, having an x-ray or lab test, or treating and evaluating a new or existing condition :357950}  {Patient advised of split billing (Optional):646571}  Are you in the first 12 months of your Medicare coverage?  { :541814::\"No\"}    HPI  Do you feel safe in your environment? { :946784}    Have you ever done Advance Care Planning? (For example, a Health Directive, POLST, or a discussion with a medical provider or your loved ones about your wishes): { :112545}    {Hearing Test Done (Optional):716415}   Fall risk  { :170655}  {If any of the above assessments are answered yes, consider ordering appropriate referrals (Optional):576058::\"click delete button to remove this line now\"}  Cognitive Screening { :691664}    {Do you have sleep apnea, excessive snoring or daytime drowsiness? (Optional):664112}    Reviewed and updated as needed this visit by clinical staff                    Reviewed and updated as needed this visit by Provider                   Social History     Tobacco Use     Smoking status: Never Smoker     Smokeless tobacco: Never Used   Substance Use Topics     Alcohol use: Never     {Rooming Staff- Complete this question if Prescreen response is not shown below for today's visit. If you drink alcohol do you typically have >3 drinks per day or >7 drinks per week? (Optional):951988}    No flowsheet data found.{add AUDIT responses (Optional) (A score of 7 for adult men is an indication of hazardous drinking; a score of 8 or more is an indication of an alcohol use disorder.  A score of 7 or more for adult women is an indication of hazardous drinking or an alchohol use " disorder):825611}    {Outside tests to abstract? :569597}    {additional problems to add (Optional):887465}    Current providers sharing in care for this patient include: {Rooming staff:  Please update Care Team in Rooming Activity, refresh this note and then delete this statement}  Patient Care Team:  Shira Denson APRN CNP as PCP - General (Nurse Practitioner - Family)  Shira Denson APRN CNP as Assigned PCP  Denis Rivas RPH as Pharmacist (Pharmacist Clinician- Clinical Pharmacy Specialist)  Papa Fish MD as Assigned Heart and Vascular Provider  Denis Rivas RPH as Assigned MTM Pharmacist    The following health maintenance items are reviewed in Epic and correct as of today:  Health Maintenance   Topic Date Due     URINALYSIS  Never done     Pneumococcal Vaccine: 65+ Years (1 - PCV) Never done     ZOSTER IMMUNIZATION (1 of 2) Never done     COLORECTAL CANCER SCREENING  10/19/2011     EYE EXAM  03/29/2013     FALL RISK ASSESSMENT  Never done     AORTIC ANEURYSM SCREENING (SYSTEM ASSIGNED)  Never done     COVID-19 Vaccine (2 - Moderna series) 04/19/2022     A1C  07/28/2022     INFLUENZA VACCINE (1) Never done     LIPID  10/27/2022     ANNUAL REVIEW OF HM ORDERS  01/26/2023     HEMOGLOBIN  02/01/2023     DIABETIC FOOT EXAM  02/28/2023     DTAP/TDAP/TD IMMUNIZATION (3 - Td or Tdap) 03/29/2023     BMP  04/28/2023     MICROALBUMIN  04/28/2023     MEDICARE ANNUAL WELLNESS VISIT  10/03/2023     ADVANCE CARE PLANNING  05/09/2024     HEPATITIS C SCREENING  Completed     PHQ-2 (once per calendar year)  Completed     IPV IMMUNIZATION  Aged Out     MENINGITIS IMMUNIZATION  Aged Out     {Chronicprobdata (optional):361588}  {Decision Support (Optional):563543}        Review of Systems  {ROS COMP (Optional):448542}    OBJECTIVE:   There were no vitals taken for this visit. Estimated body mass index is 27.87 kg/m  as calculated from the following:    Height as of 2/28/22: 1.905  "m (6' 3\").    Weight as of 9/15/22: 101.2 kg (223 lb).  Physical Exam  {Exam (Optional) :032973}    {Diagnostic Test Results (Optional):780470::\"Diagnostic Test Results:\",\"Labs reviewed in Epic\"}    ASSESSMENT / PLAN:   {Diag Picklist:777079}    {Patient advised of split billing (Optional):961745}    COUNSELING:  {Medicare Counselin}    Estimated body mass index is 27.87 kg/m  as calculated from the following:    Height as of 22: 1.905 m (6' 3\").    Weight as of 9/15/22: 101.2 kg (223 lb).    {Weight Management Plan (ACO) Complete if BMI is abnormal-  Ages 18-64  BMI >24.9.  Age 65+ with BMI <23 or >30 (Optional):074502}    He reports that he has never smoked. He has never used smokeless tobacco.      Appropriate preventive services were discussed with this patient, including applicable screening as appropriate for cardiovascular disease, diabetes, osteopenia/osteoporosis, and glaucoma.  As appropriate for age/gender, discussed screening for colorectal cancer, prostate cancer, breast cancer, and cervical cancer. Checklist reviewing preventive services available has been given to the patient.    Reviewed patients plan of care and provided an AVS. The {CarePlan:762917} for Juan Carlos meets the Care Plan requirement. This Care Plan has been established and reviewed with the {PATIENT, FAMILY MEMBER, CAREGIVER:107587}.    Counseling Resources:  ATP IV Guidelines  Pooled Cohorts Equation Calculator  Breast Cancer Risk Calculator  Breast Cancer: Medication to Reduce Risk  FRAX Risk Assessment  ICSI Preventive Guidelines  Dietary Guidelines for Americans,   [employer]'s MyPlate  ASA Prophylaxis  Lung CA Screening    [facility]    Identified Health Risks:  "

## 2022-10-03 ENCOUNTER — OFFICE VISIT (OUTPATIENT)
Dept: FAMILY MEDICINE | Facility: CLINIC | Age: 67
End: 2022-10-03
Payer: COMMERCIAL

## 2022-10-03 VITALS
BODY MASS INDEX: 26.36 KG/M2 | DIASTOLIC BLOOD PRESSURE: 72 MMHG | HEIGHT: 75 IN | OXYGEN SATURATION: 97 % | SYSTOLIC BLOOD PRESSURE: 128 MMHG | TEMPERATURE: 97.8 F | HEART RATE: 70 BPM | WEIGHT: 212 LBS | RESPIRATION RATE: 18 BRPM

## 2022-10-03 DIAGNOSIS — Z86.79 HISTORY OF ATRIAL FIBRILLATION: ICD-10-CM

## 2022-10-03 DIAGNOSIS — Z12.11 SCREEN FOR COLON CANCER: ICD-10-CM

## 2022-10-03 DIAGNOSIS — E11.42 TYPE 2 DIABETES MELLITUS WITH POLYNEUROPATHY (H): Primary | ICD-10-CM

## 2022-10-03 DIAGNOSIS — E78.5 HYPERLIPIDEMIA LDL GOAL <100: ICD-10-CM

## 2022-10-03 DIAGNOSIS — Z13.220 SCREENING FOR HYPERLIPIDEMIA: ICD-10-CM

## 2022-10-03 PROBLEM — H54.40 BLINDNESS OF RIGHT EYE WITH NORMAL VISION IN CONTRALATERAL EYE: Status: ACTIVE | Noted: 2022-10-03

## 2022-10-03 LAB
ALBUMIN SERPL-MCNC: 4 G/DL (ref 3.4–5)
ALBUMIN UR-MCNC: NEGATIVE MG/DL
ALP SERPL-CCNC: 82 U/L (ref 40–150)
ALT SERPL W P-5'-P-CCNC: 41 U/L (ref 0–70)
ANION GAP SERPL CALCULATED.3IONS-SCNC: 8 MMOL/L (ref 3–14)
APPEARANCE UR: CLEAR
AST SERPL W P-5'-P-CCNC: 22 U/L (ref 0–45)
BASOPHILS # BLD AUTO: 0 10E3/UL (ref 0–0.2)
BASOPHILS NFR BLD AUTO: 1 %
BILIRUB SERPL-MCNC: 0.6 MG/DL (ref 0.2–1.3)
BILIRUB UR QL STRIP: NEGATIVE
BUN SERPL-MCNC: 24 MG/DL (ref 7–30)
CALCIUM SERPL-MCNC: 9.7 MG/DL (ref 8.5–10.1)
CHLORIDE BLD-SCNC: 103 MMOL/L (ref 94–109)
CHOLEST SERPL-MCNC: 204 MG/DL
CO2 SERPL-SCNC: 23 MMOL/L (ref 20–32)
COLOR UR AUTO: YELLOW
CREAT SERPL-MCNC: 1.63 MG/DL (ref 0.66–1.25)
CREAT UR-MCNC: 73 MG/DL
EOSINOPHIL # BLD AUTO: 0.2 10E3/UL (ref 0–0.7)
EOSINOPHIL NFR BLD AUTO: 3 %
ERYTHROCYTE [DISTWIDTH] IN BLOOD BY AUTOMATED COUNT: 13.2 % (ref 10–15)
FASTING STATUS PATIENT QL REPORTED: NO
GFR SERPL CREATININE-BSD FRML MDRD: 46 ML/MIN/1.73M2
GLUCOSE BLD-MCNC: 349 MG/DL (ref 70–99)
GLUCOSE UR STRIP-MCNC: >=1000 MG/DL
HBA1C MFR BLD: 12.5 % (ref 0–5.6)
HCT VFR BLD AUTO: 43.6 % (ref 40–53)
HDLC SERPL-MCNC: 54 MG/DL
HGB BLD-MCNC: 14.8 G/DL (ref 13.3–17.7)
HGB UR QL STRIP: NEGATIVE
KETONES UR STRIP-MCNC: NEGATIVE MG/DL
LDLC SERPL CALC-MCNC: 92 MG/DL
LEUKOCYTE ESTERASE UR QL STRIP: NEGATIVE
LYMPHOCYTES # BLD AUTO: 1.3 10E3/UL (ref 0.8–5.3)
LYMPHOCYTES NFR BLD AUTO: 21 %
MCH RBC QN AUTO: 28.5 PG (ref 26.5–33)
MCHC RBC AUTO-ENTMCNC: 33.9 G/DL (ref 31.5–36.5)
MCV RBC AUTO: 84 FL (ref 78–100)
MICROALBUMIN UR-MCNC: 42 MG/L
MICROALBUMIN/CREAT UR: 57.53 MG/G CR (ref 0–17)
MONOCYTES # BLD AUTO: 0.6 10E3/UL (ref 0–1.3)
MONOCYTES NFR BLD AUTO: 9 %
NEUTROPHILS # BLD AUTO: 4.2 10E3/UL (ref 1.6–8.3)
NEUTROPHILS NFR BLD AUTO: 67 %
NITRATE UR QL: NEGATIVE
NONHDLC SERPL-MCNC: 150 MG/DL
PH UR STRIP: 5 [PH] (ref 5–7)
PLATELET # BLD AUTO: 174 10E3/UL (ref 150–450)
POTASSIUM BLD-SCNC: 4.3 MMOL/L (ref 3.4–5.3)
PROT SERPL-MCNC: 7.3 G/DL (ref 6.8–8.8)
RBC # BLD AUTO: 5.2 10E6/UL (ref 4.4–5.9)
SODIUM SERPL-SCNC: 134 MMOL/L (ref 133–144)
SP GR UR STRIP: 1.01 (ref 1–1.03)
TRIGL SERPL-MCNC: 290 MG/DL
TSH SERPL DL<=0.005 MIU/L-ACNC: 1.91 MU/L (ref 0.4–4)
UROBILINOGEN UR STRIP-ACNC: 0.2 E.U./DL
WBC # BLD AUTO: 6.3 10E3/UL (ref 4–11)

## 2022-10-03 PROCEDURE — 82043 UR ALBUMIN QUANTITATIVE: CPT | Performed by: NURSE PRACTITIONER

## 2022-10-03 PROCEDURE — 80053 COMPREHEN METABOLIC PANEL: CPT | Performed by: NURSE PRACTITIONER

## 2022-10-03 PROCEDURE — 84443 ASSAY THYROID STIM HORMONE: CPT | Performed by: NURSE PRACTITIONER

## 2022-10-03 PROCEDURE — 85025 COMPLETE CBC W/AUTO DIFF WBC: CPT | Performed by: NURSE PRACTITIONER

## 2022-10-03 PROCEDURE — 81003 URINALYSIS AUTO W/O SCOPE: CPT | Performed by: NURSE PRACTITIONER

## 2022-10-03 PROCEDURE — 36415 COLL VENOUS BLD VENIPUNCTURE: CPT | Performed by: NURSE PRACTITIONER

## 2022-10-03 PROCEDURE — 99214 OFFICE O/P EST MOD 30 MIN: CPT | Performed by: NURSE PRACTITIONER

## 2022-10-03 PROCEDURE — 83036 HEMOGLOBIN GLYCOSYLATED A1C: CPT | Performed by: NURSE PRACTITIONER

## 2022-10-03 PROCEDURE — 80061 LIPID PANEL: CPT | Performed by: NURSE PRACTITIONER

## 2022-10-03 RX ORDER — GLUCOSAMINE HCL/CHONDROITIN SU 500-400 MG
CAPSULE ORAL
Qty: 100 EACH | Refills: 3 | Status: SHIPPED | OUTPATIENT
Start: 2022-10-03

## 2022-10-03 RX ORDER — DULAGLUTIDE 1.5 MG/.5ML
1.5 INJECTION, SOLUTION SUBCUTANEOUS
Qty: 6 ML | Refills: 1 | Status: SHIPPED | OUTPATIENT
Start: 2022-10-03 | End: 2023-03-14

## 2022-10-03 RX ORDER — SIMVASTATIN 40 MG
TABLET ORAL
Qty: 90 TABLET | Refills: 3 | Status: SHIPPED | OUTPATIENT
Start: 2022-10-03 | End: 2023-10-04

## 2022-10-03 RX ORDER — INSULIN GLARGINE 100 [IU]/ML
15 INJECTION, SOLUTION SUBCUTANEOUS AT BEDTIME
Qty: 30 ML | Refills: 1 | Status: SHIPPED | OUTPATIENT
Start: 2022-10-03 | End: 2022-10-31

## 2022-10-03 RX ORDER — FLURBIPROFEN SODIUM 0.3 MG/ML
SOLUTION/ DROPS OPHTHALMIC
Qty: 100 EACH | Refills: 3 | Status: SHIPPED | OUTPATIENT
Start: 2022-10-03 | End: 2023-10-19

## 2022-10-03 ASSESSMENT — ENCOUNTER SYMPTOMS
DYSPHORIC MOOD: 0
WHEEZING: 0
SORE THROAT: 0
FREQUENCY: 0
SHORTNESS OF BREATH: 1
DYSURIA: 0
JOINT SWELLING: 0
DIARRHEA: 0
ARTHRALGIAS: 0
ABDOMINAL PAIN: 0
PALPITATIONS: 0
NERVOUS/ANXIOUS: 0
NUMBNESS: 0
SINUS PRESSURE: 0
COUGH: 0
SLEEP DISTURBANCE: 0
CONSTIPATION: 0
DIZZINESS: 0
FATIGUE: 1
RHINORRHEA: 0
LIGHT-HEADEDNESS: 0
HEADACHES: 0

## 2022-10-03 ASSESSMENT — PAIN SCALES - GENERAL: PAINLEVEL: MILD PAIN (2)

## 2022-10-03 NOTE — PROGRESS NOTES
Assessment & Plan     Screen for colon cancer  Declines    Type 2 diabetes mellitus with polyneuropathy (H)  We will recheck A1c here today.  Resistant to changing any more medications.  Has had multiple medication changes over the last 9 months and has been feeling poorly since.  Full plan will depend on outcome of A1c  If renal function is improved we will plan to restart lisinopril and metformin.  - Albumin Random Urine Quantitative with Creat Ratio; Future  - Comprehensive metabolic panel; Future  - Lipid panel reflex to direct LDL Fasting; Future  - TSH with free T4 reflex; Future  - CBC with Platelets & Differential; Future  - UA reflex to Microscopic and Culture; Future  - Hemoglobin A1c; Future  - empagliflozin (JARDIANCE) 10 MG TABS tablet; Take 1 tablet (10 mg) by mouth daily  - dulaglutide (TRULICITY) 1.5 MG/0.5ML pen; Inject 1.5 mg Subcutaneous every 7 days  - Hemoglobin A1c  - UA reflex to Microscopic and Culture  - CBC with Platelets & Differential  - TSH with free T4 reflex  - Lipid panel reflex to direct LDL Fasting  - Comprehensive metabolic panel  - Albumin Random Urine Quantitative with Creat Ratio    Hyperlipidemia LDL goal <100  Tolerating simvastatin well.  Refills given.  - simvastatin (ZOCOR) 40 MG tablet; TAKE ONE TABLET BY MOUTH EVERY NIGHT AT BEDTIME    History of atrial fibrillation  Previous cardiology notes reviewed.  Likely metoprolol is because of fatigue and shortness of breath.  Encouraged to follow through with MANOJ.  Hopefully will be able to discontinue metoprolol and Xarelto.  If not able to discontinue metoprolol consider decreasing dose.    Review of external notes as documented elsewhere in note  Review of the result(s) of each unique test - Labs, imaging  Ordering of each unique test  Prescription drug management  48 minutes spent on the date of the encounter doing chart review, history and exam, documentation and further activities per the note     BMI:   Estimated body  "mass index is 26.5 kg/m  as calculated from the following:    Height as of this encounter: 1.905 m (6' 3\").    Weight as of this encounter: 96.2 kg (212 lb).         No follow-ups on file.    Shira Denson, LATHA CNP  [facility] JADIEL Rangel is a 67 year old presenting for the following health issues:  Recheck Medication      HPI      Diabetes Follow-up       How often are you checking your blood sugar? Not at all    What concerns do you have today about your diabetes? None     Do you have any of these symptoms? (Select all that apply)  No numbness or tingling in feet.  No redness, sores or blisters on feet.  No complaints of excessive thirst.  No reports of blurry vision.  No significant changes to weight.    Have you had a diabetic eye exam in the last 12 months? No        Hyperlipidemia Follow-Up       Are you regularly taking any medication or supplement to lower your cholesterol?   Yes- simvastatin 40 mg    Are you having muscle aches or other side effects that you think could be caused by your cholesterol lowering medication?  No     Hypertension Follow-up       Do you check your blood pressure regularly outside of the clinic? Yes    Are you following a low salt diet? No    Are your blood pressures ever more than 140 on the top number (systolic) OR more       than 90 on the bottom number (diastolic), for example 140/90?No    *Increased fatigue and states feels 'terrible' all the time. Going to cardiology tomorrow for med changes    Nicole Rodgers CMA    Here today for diabetic follow-up.  Has not been checking blood sugars at home.  Cost of Trulicity has come down to $47 per month.  Continues to report that since changing medications and hospitalist physician has been feeling poorly.  Feels the worst that has ever felt in his entire life these past 9 months. Sleeps all the time.  No energy or ambition. Did recently meet with cardiology they are planning ZIO.  If no atrial fib " "will discontinue metoprolol and Xarelto.  Feels like metoprolol makes feel short of breath like when had asthma as a child.  Likes taking the Trulicity better than multiple daily injections.  It is much more convenient.  Last eye exam 1 to 2 years ago.  November 4, 1981 loss of eyesight in right eye-accident at work.            Review of Systems   Constitutional: Positive for fatigue.   HENT: Negative for congestion, ear pain, rhinorrhea, sinus pressure and sore throat.    Respiratory: Positive for shortness of breath. Negative for cough and wheezing.    Cardiovascular: Negative for chest pain and palpitations.   Gastrointestinal: Negative for abdominal pain, constipation and diarrhea.   Genitourinary: Negative for dysuria and frequency.   Musculoskeletal: Negative for arthralgias and joint swelling.   Skin: Negative for rash.   Neurological: Negative for dizziness, light-headedness, numbness and headaches.   Psychiatric/Behavioral: Negative for dysphoric mood and sleep disturbance. The patient is not nervous/anxious.           Objective    /72   Pulse 70   Temp 97.8  F (36.6  C) (Tympanic)   Resp 18   Ht 1.905 m (6' 3\")   Wt 96.2 kg (212 lb)   SpO2 97%   BMI 26.50 kg/m    Body mass index is 26.5 kg/m .  Physical Exam  Constitutional:       Appearance: He is well-developed.   HENT:      Right Ear: Tympanic membrane and external ear normal.      Left Ear: Tympanic membrane and external ear normal.      Mouth/Throat:      Pharynx: Uvula midline.   Neck:      Thyroid: No thyromegaly.      Vascular: No carotid bruit.   Cardiovascular:      Rate and Rhythm: Normal rate and regular rhythm.      Heart sounds: Normal heart sounds.   Pulmonary:      Effort: Pulmonary effort is normal.      Breath sounds: Normal breath sounds.   Abdominal:      General: Bowel sounds are normal.      Palpations: Abdomen is soft.   Skin:     General: Skin is warm and dry.   Neurological:      Mental Status: He is alert.         "

## 2022-10-04 ENCOUNTER — TELEPHONE (OUTPATIENT)
Dept: FAMILY MEDICINE | Facility: CLINIC | Age: 67
End: 2022-10-04

## 2022-10-04 ENCOUNTER — HOSPITAL ENCOUNTER (OUTPATIENT)
Dept: CARDIOLOGY | Facility: CLINIC | Age: 67
Discharge: HOME | End: 2022-10-04
Attending: INTERNAL MEDICINE | Admitting: INTERNAL MEDICINE
Payer: COMMERCIAL

## 2022-10-04 DIAGNOSIS — Z86.79 HISTORY OF ATRIAL FIBRILLATION: ICD-10-CM

## 2022-10-04 PROCEDURE — 93242 EXT ECG>48HR<7D RECORDING: CPT

## 2022-10-04 PROCEDURE — 93244 EXT ECG>48HR<7D REV&INTERPJ: CPT | Performed by: INTERNAL MEDICINE

## 2022-10-04 NOTE — TELEPHONE ENCOUNTER
I called and spoke to Clari and Juan Carlos, he is driving so put Clari on the phone.      They saw the mychart message, decline endocrine referral at this time.   Will see how readings are in 2-3 weeks.    They never got a call from nephrology and they appreciate the phone number but state they have too much going on right now so will consider that at another time.    Routed to PCP as DEVON Melendez RN  [facility]

## 2022-10-04 NOTE — TELEPHONE ENCOUNTER
----- Message from LATHA Whitlock CNP sent at 10/3/2022  6:10 PM CDT -----  Please call Stevo with results.    Frantz Rangel and Juan Carlos Montague-your hemoglobin A1c, 3-month average of your blood sugar is more elevated than it previously was.  Unfortunately, I would recommend that we restart the long-acting insulin Lantus that you were previously taking.  I would like for you to start taking 15 units every night.  You would still continue the Trulicity and the Jardiance.  I would also recommend that you start wearing the freestyle kar sensor again.  Would like for you to check your blood sugars at the very minimum every morning when you wake up.  Please report your blood sugars to me in 2 to 3 weeks.  If it is easier to make a phone call visit then send a MyChart message that is perfectly fine.  You are doing things right by avoiding all of the sweets and candies.  Please keep doing that.  Unfortunately, sometimes you do everything right and still do not see the results you wish for.  If you would like to meet with an endocrinologist, diabetic specialist please let me know.  I would be more than happy to place that referral order for you.    Your kidney function is more elevated than it was at last check.  I would really like for you to meet with a kidney specialist, nephrologist.  I have placed that order back in May.  Did they ever contact you to set up appointment time?  You can call [phone].  In the meantime, continue to drink plenty of water, avoid over-the-counter ibuprofen, Advil, Aleve and naproxen.  You can take Tylenol/acetaminophen if you need to.    Shira MAXWELL

## 2022-10-14 ENCOUNTER — TELEPHONE (OUTPATIENT)
Dept: CARDIOLOGY | Facility: CLINIC | Age: 67
End: 2022-10-14

## 2022-10-14 DIAGNOSIS — I44.1 MOBITZ TYPE 2 SECOND DEGREE HEART BLOCK: ICD-10-CM

## 2022-10-14 DIAGNOSIS — R00.1 BRADYCARDIA: Primary | ICD-10-CM

## 2022-10-14 DIAGNOSIS — I10 HYPERTENSION GOAL BP (BLOOD PRESSURE) < 140/80: ICD-10-CM

## 2022-10-14 DIAGNOSIS — I48.20 CHRONIC ATRIAL FIBRILLATION (H): ICD-10-CM

## 2022-10-14 RX ORDER — METOPROLOL TARTRATE 25 MG/1
12.5 TABLET, FILM COATED ORAL 2 TIMES DAILY
Qty: 15 TABLET | Refills: 3 | Status: SHIPPED | OUTPATIENT
Start: 2022-10-14 | End: 2022-10-18

## 2022-10-14 NOTE — TELEPHONE ENCOUNTER
Called pt to discuss. States he had no symptoms while he was wearing the Zio and continues to feel well. Called [employer] to have them fax report and strips to me. Dr Fish out of the office until Monday. Teagan Johansen RN Cardiology October 14, 2022, 12:01 PM

## 2022-10-14 NOTE — TELEPHONE ENCOUNTER
Discussed with Dr Son in clinic as Dr Fish is out of the office until Monday. To dec metoprolol to 12.5 mg BID and have pt get 7 day event monitor and follow up. Orders placed and script sent to pharmacy. Disc with patient. He is happy to hear about dec metoprolol as he has been having significant fatigue with it. He was able to repeat dose back to me. He is scheduled to see Jeana Gamboa NP on 10/21/22 and would like to wait with event monitor until he talks with her. Will also route this encounter to Dr Fish for his information. Teagan Johansen RN Cardiology October 14, 2022, 1:37 PM

## 2022-10-14 NOTE — TELEPHONE ENCOUNTER
Critical [employer] results from zio patch monitor.     [employer] called with results for pt. Ref # [id number] phone: [phone]  1. 8 episodes logged of high degree heart block with lowest HR at 21bpm  2. 10 pause episodes logged, longest pause was 6.6s and can be found on page 9 (I don't have the actual report, just received the call).  Will forward to Dr. Fish.   SAGE Pierce

## 2022-10-18 DIAGNOSIS — I44.39 HIGH-GRADE ATRIOVENTRICULAR BLOCK: Primary | ICD-10-CM

## 2022-10-19 ENCOUNTER — HOSPITAL ENCOUNTER (OUTPATIENT)
Dept: CARDIOLOGY | Facility: CLINIC | Age: 67
Discharge: HOME | End: 2022-10-19
Attending: INTERNAL MEDICINE | Admitting: INTERNAL MEDICINE
Payer: COMMERCIAL

## 2022-10-19 DIAGNOSIS — I44.39 HIGH-GRADE ATRIOVENTRICULAR BLOCK: ICD-10-CM

## 2022-10-19 PROCEDURE — 93225 XTRNL ECG REC<48 HRS REC: CPT

## 2022-10-19 PROCEDURE — 93227 XTRNL ECG REC<48 HR R&I: CPT | Performed by: INTERNAL MEDICINE

## 2022-10-28 ENCOUNTER — OFFICE VISIT (OUTPATIENT)
Dept: CARDIOLOGY | Facility: CLINIC | Age: 67
End: 2022-10-28
Attending: INTERNAL MEDICINE
Payer: COMMERCIAL

## 2022-10-28 VITALS
OXYGEN SATURATION: 98 % | HEART RATE: 79 BPM | WEIGHT: 226.2 LBS | SYSTOLIC BLOOD PRESSURE: 149 MMHG | BODY MASS INDEX: 28.27 KG/M2 | DIASTOLIC BLOOD PRESSURE: 96 MMHG

## 2022-10-28 DIAGNOSIS — I44.39 HIGH-GRADE ATRIOVENTRICULAR BLOCK: ICD-10-CM

## 2022-10-28 DIAGNOSIS — Z86.79 HISTORY OF ATRIAL FIBRILLATION: Primary | ICD-10-CM

## 2022-10-28 DIAGNOSIS — I10 BENIGN ESSENTIAL HYPERTENSION: ICD-10-CM

## 2022-10-28 PROCEDURE — 99214 OFFICE O/P EST MOD 30 MIN: CPT | Performed by: NURSE PRACTITIONER

## 2022-10-28 RX ORDER — AMLODIPINE BESYLATE 2.5 MG/1
2.5 TABLET ORAL DAILY
Qty: 30 TABLET | Refills: 11 | Status: CANCELLED | OUTPATIENT
Start: 2022-10-28

## 2022-10-28 ASSESSMENT — PAIN SCALES - GENERAL: PAINLEVEL: NO PAIN (0)

## 2022-10-28 NOTE — PROGRESS NOTES
"Cardiology Clinic Progress Note  Juan Carlos Rizo MRN# [id number]   YOB: 1955 Age: 67 year old      Primary Cardiologist:   Dr. Fish          History of Presenting Illness:      Juan Carlos Rizo is a pleasant 67 year old patient with a past cardiac history significant for   1. Atrial fibrillation    Onset 1/2022 in context of COVID    No A. fib on follow-up monitor 10/2022    Xarelto and beta-blocker discontinued  2. Hypertension  3. Second-degree AV block    Improved after discontinuing beta-blocker  Past medical history significant for diabetes, CKD, COVID in 2022.    In January 2022 he was admitted to [facility] with new onset atrial fibrillation, uncontrolled diabetes, COVID, and BOUBACAR.  He was started on Xarelto.  Echocardiogram showed EF 55%, normal RV, no valvular disease.    Pt was last seen by Dr. Fish in September 2022 in consultation.  He finally recovered from COVID and was feeling back to \"normal\".  He was able to go for walks without any cardiac complaints.  He continued with some generalized fatigue. He recommended 3-day Zio patch and if no atrial fibrillation could stop anticoagulation and wean off metoprolol as it may be contributing to fatigue.  Would then start amlodipine  for hypertension.    Pt presents today for testing follow-up. 3-day Zio patch showed no atrial fibrillation but had an episode of second-degree AV block with pauses up to 6.6 seconds, 8 runs of PSVT longest lasting 13 beats, and 2% PACs.  Dr. Fish reviewed this and recommended discontinuing metoprolol and having a follow-up 48-hour Holter.  Xarelto was discontinued. 48-hour Holter monitor showed sinus rhythm with average heart rate 76 bpm, second-degree AV block with 1.8-second pause at 2 AM, PVCs, 9% PACs, and no events reported.  This again was reviewed by Dr. Fish who noted that there were a few dropped QRS complexes but no significant bradycardia.  If he is asymptomatic would not make any " changes but repeat monitor periodically.  Results reviewed today.     He has discontinued Xarelto and metoprolol.  His fatigue has resolved and is feeling much better.  He denies any lightheadedness or presyncope.  Blood pressure is elevated but he declines adding antihypertensive at this time.  He is agreeable to checking home blood pressures.  He has some concerns with chest tightness during deep breaths and asked him to follow-up with PCP. Patient reports no chest pain, shortness of breath, PND, orthopnea, presyncope, syncope, edema, heart racing, or palpitations.    Labs:  LIPID RESULTS:  Lab Results   Component Value Date    CHOL 204 (H) 10/03/2022    CHOL 178 04/01/2021    HDL 54 10/03/2022    HDL 48 04/01/2021    LDL 92 10/03/2022    LDL 83 04/01/2021    TRIG 290 (H) 10/03/2022    TRIG 233 (H) 04/01/2021    CHOLHDLRATIO 4.0 06/01/2012       LIVER ENZYME RESULTS:  Lab Results   Component Value Date    AST 22 10/03/2022    AST 13 04/01/2021    ALT 41 10/03/2022    ALT 38 04/01/2021       CBC RESULTS:  Lab Results   Component Value Date    WBC 6.3 10/03/2022    WBC 5.8 05/09/2019    RBC 5.20 10/03/2022    RBC 4.76 05/09/2019    HGB 14.8 10/03/2022    HGB 13.5 05/09/2019    HCT 43.6 10/03/2022    HCT 40.1 05/09/2019    MCV 84 10/03/2022    MCV 84 05/09/2019    MCH 28.5 10/03/2022    MCH 28.4 05/09/2019    MCHC 33.9 10/03/2022    MCHC 33.7 05/09/2019    RDW 13.2 10/03/2022    RDW 12.8 05/09/2019     10/03/2022     05/09/2019       BMP RESULTS:  Lab Results   Component Value Date     10/03/2022     04/01/2021    POTASSIUM 4.3 10/03/2022    POTASSIUM 4.5 04/01/2021    CHLORIDE 103 10/03/2022    CHLORIDE 103 04/01/2021    CO2 23 10/03/2022    CO2 28 04/01/2021    ANIONGAP 8 10/03/2022    ANIONGAP 5 04/01/2021     (H) 10/03/2022     (H) 04/01/2021    BUN 24 10/03/2022    BUN 18 04/01/2021    CR 1.63 (H) 10/03/2022    CR 1.22 04/01/2021    GFRESTIMATED 46 (L) 10/03/2022     GFRESTIMATED 62 04/01/2021    GFRESTBLACK 72 04/01/2021    JELANI 9.7 10/03/2022    JELANI 9.1 04/01/2021        A1C RESULTS:  Lab Results   Component Value Date    A1C 12.5 (H) 10/03/2022    A1C 11.5 (H) 04/01/2021       INR RESULTS:  No results found for: INR    Results reviewed today.       Current Cardiac Medications     Jardiance 10 mg daily  Simvastatin 40 mg daily                   Assessment and Plan:       Plan    Patient Instructions   Medication Changes:  4. None     Recommendations:  1. Check blood pressure at least 1 hour after medications. Call the clinic if your blood pressure is consistently greater than 140/90  2. Call my nurse with 1-2 weeks of blood pressure readings     Follow-up:  1. 24 hr Holter monitor April 2023  2. See Dr. Fish for cardiology follow up at [facility]: April 2023.   Call 6 months prior, to schedule.     Cardiology Scheduling~[phone]  Cardiology Clinic RN~[phone] (Teagan RN, Rani RN, Nhung RN)            1. Atrial fibrillation    Secondary to COVID    No further atrial fibrillation noted on monitors    No need for anticoagulation      2. Hypertension    Not Controlled    Declined antihypertensives    Consider starting amlodipine      3. Second-degree AV block    Noted on heart monitor 10/2022-on metoprolol    Beta-blocker discontinued    Continued with second-degree AV block with pause of 1.8 seconds at 2 AM and no symptoms    Avoid AV fawn blocking agents    Monitor periodically                 Thank you for allowing me to participate in this delightful patient's care.      This note was completed in part using Dragon voice recognition software. Although reviewed after completion, some word and grammatical errors may occur.    Jeana Spann, APRN CNP, APRN, CNP           Data:   All laboratory data reviewed           Constitutional:  cooperative, alert and oriented, well developed, well nourished, in no acute distress      Skin:  warm and dry to  the touch         Head:  normocephalic       Eyes:  pupils equal and round       ENT:  no pallor or cyanosis       Neck:  no stiffness       Respiratory:  clear to auscultation; normal symmetry        Cardiac: regular rate and rhythm; normal S1 and S2                 pulses full and equal       GI:  abdomen soft, nondistended     Extremities and Muscular Skeletal:  no edema        Neurological:  affect appropriate; no gross motor deficits       Psych:  Alert and Oriented x 3 , appropriate affact

## 2022-10-28 NOTE — PATIENT INSTRUCTIONS
Medication Changes:  None     Recommendations:  Check blood pressure at least 1 hour after medications. Call the clinic if your blood pressure is consistently greater than 140/90  Call my nurse with 1-2 weeks of blood pressure readings     Follow-up:  24 hr Holter monitor April 2023  See Dr. iFsh for cardiology follow up at [facility]: April 2023.   Call 6 months prior, to schedule.     Cardiology Scheduling~[phone]  Cardiology Clinic RN~[phone] (Teagan RN, Rani RN, Nhung RN)

## 2022-10-28 NOTE — LETTER
"10/28/2022    Shira Denson, APRN CNP  [address] [place] [address]    RE: Juan Carlos Rizo       Dear Colleague,     I had the pleasure of seeing Juan Carlos Rizo in the [facility].  Cardiology Clinic Progress Note  Juan Carlos Rizo MRN# [id number]   YOB: 1955 Age: 67 year old      Primary Cardiologist:   Dr. Fish          History of Presenting Illness:      Juan Carlos Rizo is a pleasant 67 year old patient with a past cardiac history significant for   1. Atrial fibrillation    Onset 1/2022 in context of COVID    No A. fib on follow-up monitor 10/2022    Xarelto and beta-blocker discontinued  2. Hypertension  3. Second-degree AV block    Improved after discontinuing beta-blocker  Past medical history significant for diabetes, CKD, COVID in 2022.    In January 2022 he was admitted to [facility] with new onset atrial fibrillation, uncontrolled diabetes, COVID, and BOUBACAR.  He was started on Xarelto.  Echocardiogram showed EF 55%, normal RV, no valvular disease.    Pt was last seen by Dr. Fish in September 2022 in consultation.  He finally recovered from COVID and was feeling back to \"normal\".  He was able to go for walks without any cardiac complaints.  He continued with some generalized fatigue. He recommended 3-day Zio patch and if no atrial fibrillation could stop anticoagulation and wean off metoprolol as it may be contributing to fatigue.  Would then start amlodipine  for hypertension.    Pt presents today for testing follow-up. 3-day Zio patch showed no atrial fibrillation but had an episode of second-degree AV block with pauses up to 6.6 seconds, 8 runs of PSVT longest lasting 13 beats, and 2% PACs.  Dr. Fish reviewed this and recommended discontinuing metoprolol and having a follow-up 48-hour Holter.  Xarelto was discontinued. 48-hour Holter monitor showed sinus rhythm with average heart rate 76 bpm, second-degree AV " block with 1.8-second pause at 2 AM, PVCs, 9% PACs, and no events reported.  This again was reviewed by Dr. Fish who noted that there were a few dropped QRS complexes but no significant bradycardia.  If he is asymptomatic would not make any changes but repeat monitor periodically.  Results reviewed today.     He has discontinued Xarelto and metoprolol.  His fatigue has resolved and is feeling much better.  He denies any lightheadedness or presyncope.  Blood pressure is elevated but he declines adding antihypertensive at this time.  He is agreeable to checking home blood pressures.  He has some concerns with chest tightness during deep breaths and asked him to follow-up with PCP. Patient reports no chest pain, shortness of breath, PND, orthopnea, presyncope, syncope, edema, heart racing, or palpitations.    Labs:  LIPID RESULTS:  Lab Results   Component Value Date    CHOL 204 (H) 10/03/2022    CHOL 178 04/01/2021    HDL 54 10/03/2022    HDL 48 04/01/2021    LDL 92 10/03/2022    LDL 83 04/01/2021    TRIG 290 (H) 10/03/2022    TRIG 233 (H) 04/01/2021    CHOLHDLRATIO 4.0 06/01/2012       LIVER ENZYME RESULTS:  Lab Results   Component Value Date    AST 22 10/03/2022    AST 13 04/01/2021    ALT 41 10/03/2022    ALT 38 04/01/2021       CBC RESULTS:  Lab Results   Component Value Date    WBC 6.3 10/03/2022    WBC 5.8 05/09/2019    RBC 5.20 10/03/2022    RBC 4.76 05/09/2019    HGB 14.8 10/03/2022    HGB 13.5 05/09/2019    HCT 43.6 10/03/2022    HCT 40.1 05/09/2019    MCV 84 10/03/2022    MCV 84 05/09/2019    MCH 28.5 10/03/2022    MCH 28.4 05/09/2019    MCHC 33.9 10/03/2022    MCHC 33.7 05/09/2019    RDW 13.2 10/03/2022    RDW 12.8 05/09/2019     10/03/2022     05/09/2019       BMP RESULTS:  Lab Results   Component Value Date     10/03/2022     04/01/2021    POTASSIUM 4.3 10/03/2022    POTASSIUM 4.5 04/01/2021    CHLORIDE 103 10/03/2022    CHLORIDE 103 04/01/2021    CO2 23 10/03/2022    CO2 28  04/01/2021    ANIONGAP 8 10/03/2022    ANIONGAP 5 04/01/2021     (H) 10/03/2022     (H) 04/01/2021    BUN 24 10/03/2022    BUN 18 04/01/2021    CR 1.63 (H) 10/03/2022    CR 1.22 04/01/2021    GFRESTIMATED 46 (L) 10/03/2022    GFRESTIMATED 62 04/01/2021    GFRESTBLACK 72 04/01/2021    JELANI 9.7 10/03/2022    JELANI 9.1 04/01/2021        A1C RESULTS:  Lab Results   Component Value Date    A1C 12.5 (H) 10/03/2022    A1C 11.5 (H) 04/01/2021       INR RESULTS:  No results found for: INR    Results reviewed today.       Current Cardiac Medications     Jardiance 10 mg daily  Simvastatin 40 mg daily                   Assessment and Plan:       Plan    Patient Instructions   Medication Changes:  4. None     Recommendations:  1. Check blood pressure at least 1 hour after medications. Call the clinic if your blood pressure is consistently greater than 140/90  2. Call my nurse with 1-2 weeks of blood pressure readings     Follow-up:  1. 24 hr Holter monitor April 2023  2. See Dr. Fish for cardiology follow up at [facility]: April 2023.   Call 6 months prior, to schedule.     Cardiology Scheduling~[phone]  Cardiology Clinic RN~[phone] (Teagan RN, Rani RN, Nhung RN)            1. Atrial fibrillation    Secondary to COVID    No further atrial fibrillation noted on monitors    No need for anticoagulation      2. Hypertension    Not Controlled    Declined antihypertensives    Consider starting amlodipine      3. Second-degree AV block    Noted on heart monitor 10/2022-on metoprolol    Beta-blocker discontinued    Continued with second-degree AV block with pause of 1.8 seconds at 2 AM and no symptoms    Avoid AV fawn blocking agents    Monitor periodically                 Thank you for allowing me to participate in this delightful patient's care.      This note was completed in part using Dragon voice recognition software. Although reviewed after completion, some word and grammatical errors may  occur.    LATHA Barragan CNP, LATHA, CNP           Data:   All laboratory data reviewed           Constitutional:  cooperative, alert and oriented, well developed, well nourished, in no acute distress      Skin:  warm and dry to the touch         Head:  normocephalic       Eyes:  pupils equal and round       ENT:  no pallor or cyanosis       Neck:  no stiffness       Respiratory:  clear to auscultation; normal symmetry        Cardiac: regular rate and rhythm; normal S1 and S2                 pulses full and equal       GI:  abdomen soft, nondistended     Extremities and Muscular Skeletal:  no edema        Neurological:  affect appropriate; no gross motor deficits       Psych:  Alert and Oriented x 3 , appropriate affact    Thank you for allowing me to participate in the care of your patient.      Sincerely,     LATHA Barragan CNP     [facility]  cc:   Papa Fish MD  [facility]  [address]  [place] [address]

## 2022-10-31 DIAGNOSIS — E11.42 TYPE 2 DIABETES MELLITUS WITH POLYNEUROPATHY (H): ICD-10-CM

## 2022-10-31 RX ORDER — INSULIN GLARGINE 100 [IU]/ML
20 INJECTION, SOLUTION SUBCUTANEOUS AT BEDTIME
Qty: 30 ML | Refills: 1
Start: 2022-10-31 | End: 2022-12-01

## 2022-11-20 ENCOUNTER — HEALTH MAINTENANCE LETTER (OUTPATIENT)
Age: 67
End: 2022-11-20

## 2022-11-30 DIAGNOSIS — E11.42 TYPE 2 DIABETES MELLITUS WITH POLYNEUROPATHY (H): ICD-10-CM

## 2022-12-01 RX ORDER — INSULIN GLARGINE 100 [IU]/ML
20 INJECTION, SOLUTION SUBCUTANEOUS AT BEDTIME
Qty: 30 ML | Refills: 1 | Status: SHIPPED | OUTPATIENT
Start: 2022-12-01 | End: 2023-07-12

## 2022-12-01 RX ORDER — INSULIN GLARGINE 100 [IU]/ML
INJECTION, SOLUTION SUBCUTANEOUS
Qty: 30 ML | Refills: 0 | OUTPATIENT
Start: 2022-12-01

## 2022-12-26 DIAGNOSIS — E11.42 TYPE 2 DIABETES MELLITUS WITH POLYNEUROPATHY (H): Primary | ICD-10-CM

## 2023-02-23 ENCOUNTER — TELEPHONE (OUTPATIENT)
Dept: PHARMACY | Facility: CLINIC | Age: 68
End: 2023-02-23
Payer: COMMERCIAL

## 2023-02-23 NOTE — TELEPHONE ENCOUNTER
This patient is due for MTM follow-up. I called the patient to schedule an appointment and left a message with the clinic phone number for the patient to call to schedule.    Robbin Rivas, QasimD, [facility]  Medication Therapy Management Pharmacist  Pager: [phone]

## 2023-03-07 DIAGNOSIS — E11.42 TYPE 2 DIABETES MELLITUS WITH POLYNEUROPATHY (H): ICD-10-CM

## 2023-04-15 ENCOUNTER — HEALTH MAINTENANCE LETTER (OUTPATIENT)
Age: 68
End: 2023-04-15

## 2023-04-19 NOTE — PROGRESS NOTES
CARDIOLOGY VISIT    REASON FOR VISIT: afib    SUBJECTIVE:  68-year-old male seen for atrial fibrillation.  He has diabetes and CKD.     January 2022 he was admitted to [facility] and had A. fib.  He had uncontrolled diabetes, COVID, and acute kidney injury at the time.  Xarelto was started.     Echo January 2022 through [facility] showed EF 55%, normal RV, no valve disease.    Holter October 2022 showed sinus rhythm, brief episode of second-degree AV block, 9% PACs with runs up to 12 beats.    He has been doing well recently.  He denies any lightheadedness, dizziness, chest pain, or palpitations.  Heart rate has not been checked outside of clinic.  He has some generalized fatigue, no other complaints.    MEDICATIONS:  Current Outpatient Medications   Medication     alcohol swab prep pads     Continuous Blood Gluc  (FREESTYLE GERALD 2 READER) ZORAIDA     Continuous Blood Gluc Sensor (FREESTYLE GERALD 2 SENSOR) MISC     insulin glargine (LANTUS SOLOSTAR) 100 UNIT/ML pen     insulin pen needle (ULTICARE MINI) 31G X 6 MM miscellaneous     JARDIANCE 10 MG TABS tablet     simvastatin (ZOCOR) 40 MG tablet     TRULICITY 1.5 MG/0.5ML pen     No current facility-administered medications for this visit.       ALLERGIES:  Allergies   Allergen Reactions     Anesthetic [Amides] Nausea and Vomiting     Pt. Denies allergy to local anesthetics.  Had an episode of n/v with prior GA in 1981.  Pt. Has had prior amide local anesthetics without problems.       REVIEW OF SYSTEMS:  Constitutional:  No weight loss, fever, chills  HEENT:  Eyes:  No visual loss, blurred vision, double vision or yellow sclerae. No hearing loss, sneezing, congestion, runny nose or sore throat.  Skin:  No rash or itching.  Cardiovascular: per HPI  Respiratory: per HPI  GI:  No anorexia, nausea, vomiting or diarrhea. No abdominal pain or blood.  :  No dysurea, hematuria  Neurologic:  No headache, paralysis, ataxia, numbness or tingling in the extremities. No  change in bowel or bladder control.  Musculoskeletal:  No muscle pain  Hematologic:  No bleeding or bruising.  Lymphatics:  No enlarged nodes. No history of splenectomy.  Endocrine:  No reports of sweating, cold or heat intolerance. No polyuria or polydipsia.  Allergies:  No history of asthma, hives, eczema or rhinitis.    PHYSICAL EXAM:  BP (!) 153/82 (BP Location: Right arm, Patient Position: Sitting, Cuff Size: Adult Regular)   Pulse 84   Wt 103.9 kg (229 lb)   SpO2 98%   BMI 28.62 kg/m      Constitutional: awake, alert, no distress  Eyes: PERRL, sclera nonicteric  ENT: trachea midline  Respiratory: Lungs clear  Cardiovascular: Regular rate and rhythm, no murmurs  GI: nondistended, nontender, bowel sounds present  Lymph/Hematologic: no lymphadenopathy  Skin: dry, no rash  Musculoskeletal: good muscle tone, strength 5/5 in upper and lower extremities  Neurologic: no focal deficits  Neuropsychiatric: appropriate affact    DATA:  Lab:   Recent Labs   Lab Test 10/03/22  0745 10/27/21  0724   CHOL 204* 140   HDL 54 50   LDL 92 60   TRIG 290* 152*     ASSESSMENT:  68-year-old male seen for PACs and paroxysmal A-fib.  He had A-fib around the time of his COVID, but nothing since then.  He has had no further symptoms recently.  He has no symptoms of bradycardia.  He missed his Holter monitor appointment last week, he would prefer to only wear a Holter if he has any symptoms in the future.  He will call the clinic if he develops any cardiac symptoms.    RECOMMENDATIONS:  1.  History of A-fib and PACs  -No recurrence, no need for treatment  -Avoid beta-blockers due to fatigue and bradycardia    Follow-up in 1 year with ATTILA.  If doing well, he can follow-up as needed in the future.    Papa Fish MD  Cardiology - [facility]  Pager:  [phone]  Text Page  April 24, 2023

## 2023-04-24 ENCOUNTER — OFFICE VISIT (OUTPATIENT)
Dept: CARDIOLOGY | Facility: CLINIC | Age: 68
End: 2023-04-24
Attending: NURSE PRACTITIONER
Payer: COMMERCIAL

## 2023-04-24 VITALS
DIASTOLIC BLOOD PRESSURE: 82 MMHG | OXYGEN SATURATION: 98 % | HEART RATE: 84 BPM | SYSTOLIC BLOOD PRESSURE: 153 MMHG | WEIGHT: 229 LBS | BODY MASS INDEX: 28.62 KG/M2

## 2023-04-24 DIAGNOSIS — I10 BENIGN ESSENTIAL HYPERTENSION: ICD-10-CM

## 2023-04-24 DIAGNOSIS — Z86.79 HISTORY OF ATRIAL FIBRILLATION: ICD-10-CM

## 2023-04-24 DIAGNOSIS — I44.39 HIGH-GRADE ATRIOVENTRICULAR BLOCK: ICD-10-CM

## 2023-04-24 PROCEDURE — 99214 OFFICE O/P EST MOD 30 MIN: CPT | Performed by: INTERNAL MEDICINE

## 2023-04-24 NOTE — LETTER
4/24/2023    Shira Denson, APRN CNP  [address] [facility] [place] [address]    RE: Juan Carlos Rizo       Dear Colleague,     I had the pleasure of seeing Juan Carlos Rizo in the [facility].  CARDIOLOGY VISIT    REASON FOR VISIT: afib    SUBJECTIVE:  68-year-old male seen for atrial fibrillation.  He has diabetes and CKD.     January 2022 he was admitted to [facility] and had A. fib.  He had uncontrolled diabetes, COVID, and acute kidney injury at the time.  Xarelto was started.     Echo January 2022 through [facility] showed EF 55%, normal RV, no valve disease.    Holter October 2022 showed sinus rhythm, brief episode of second-degree AV block, 9% PACs with runs up to 12 beats.    He has been doing well recently.  He denies any lightheadedness, dizziness, chest pain, or palpitations.  Heart rate has not been checked outside of clinic.  He has some generalized fatigue, no other complaints.    MEDICATIONS:  Current Outpatient Medications   Medication    alcohol swab prep pads    Continuous Blood Gluc  (FREESTYLE GERALD 2 READER) ZORAIDA    Continuous Blood Gluc Sensor (FREESTYLE GERALD 2 SENSOR) MISC    insulin glargine (LANTUS SOLOSTAR) 100 UNIT/ML pen    insulin pen needle (ULTICARE MINI) 31G X 6 MM miscellaneous    JARDIANCE 10 MG TABS tablet    simvastatin (ZOCOR) 40 MG tablet    TRULICITY 1.5 MG/0.5ML pen     No current facility-administered medications for this visit.       ALLERGIES:  Allergies   Allergen Reactions    Anesthetic [Amides] Nausea and Vomiting     Pt. Denies allergy to local anesthetics.  Had an episode of n/v with prior GA in 1981.  Pt. Has had prior amide local anesthetics without problems.       REVIEW OF SYSTEMS:  Constitutional:  No weight loss, fever, chills  HEENT:  Eyes:  No visual loss, blurred vision, double vision or yellow sclerae. No hearing loss, sneezing, congestion, runny nose or sore throat.  Skin:  No rash or itching.  Cardiovascular: per  HPI  Respiratory: per HPI  GI:  No anorexia, nausea, vomiting or diarrhea. No abdominal pain or blood.  :  No dysurea, hematuria  Neurologic:  No headache, paralysis, ataxia, numbness or tingling in the extremities. No change in bowel or bladder control.  Musculoskeletal:  No muscle pain  Hematologic:  No bleeding or bruising.  Lymphatics:  No enlarged nodes. No history of splenectomy.  Endocrine:  No reports of sweating, cold or heat intolerance. No polyuria or polydipsia.  Allergies:  No history of asthma, hives, eczema or rhinitis.    PHYSICAL EXAM:  BP (!) 153/82 (BP Location: Right arm, Patient Position: Sitting, Cuff Size: Adult Regular)   Pulse 84   Wt 103.9 kg (229 lb)   SpO2 98%   BMI 28.62 kg/m      Constitutional: awake, alert, no distress  Eyes: PERRL, sclera nonicteric  ENT: trachea midline  Respiratory: Lungs clear  Cardiovascular: Regular rate and rhythm, no murmurs  GI: nondistended, nontender, bowel sounds present  Lymph/Hematologic: no lymphadenopathy  Skin: dry, no rash  Musculoskeletal: good muscle tone, strength 5/5 in upper and lower extremities  Neurologic: no focal deficits  Neuropsychiatric: appropriate affact    DATA:  Lab:   Recent Labs   Lab Test 10/03/22  0745 10/27/21  0724   CHOL 204* 140   HDL 54 50   LDL 92 60   TRIG 290* 152*     ASSESSMENT:  68-year-old male seen for PACs and paroxysmal A-fib.  He had A-fib around the time of his COVID, but nothing since then.  He has had no further symptoms recently.  He has no symptoms of bradycardia.  He missed his Holter monitor appointment last week, he would prefer to only wear a Holter if he has any symptoms in the future.  He will call the clinic if he develops any cardiac symptoms.    RECOMMENDATIONS:  1.  History of A-fib and PACs  -No recurrence, no need for treatment  -Avoid beta-blockers due to fatigue and bradycardia    Follow-up in 1 year with ATTILA.  If doing well, he can follow-up as needed in the future.    Papa Fish,  MD  Cardiology - [facility]  Pager:  [phone]  Text Page  April 24, 2023          Thank you for allowing me to participate in the care of your patient.      Sincerely,     Papa Fish MD     [facility]  cc:   Jeana Gamboa, LATHA CNP  [address]  [place] [address]

## 2023-04-24 NOTE — NURSING NOTE
Chief Complaint   Patient presents with     Atrial Fib     6 month follow up Afib,HTN,Second Degree AV block,HLD       There were no vitals filed for this visit.  Wt Readings from Last 1 Encounters:   10/28/22 102.6 kg (226 lb 3.2 oz)       Solange Hawk MA

## 2023-04-26 ENCOUNTER — PATIENT OUTREACH (OUTPATIENT)
Dept: FAMILY MEDICINE | Facility: CLINIC | Age: 68
End: 2023-04-26
Payer: COMMERCIAL

## 2023-04-26 NOTE — TELEPHONE ENCOUNTER
Patient Quality Outreach    Patient is due for the following:   Physical Annual Wellness Visit    Next Steps:   Schedule a Annual Wellness Visit    Type of outreach:    Sent Micrima message.      Questions for provider review:    None     Nicole Giordano CMA  Chart routed to Care Team.

## 2023-05-05 DIAGNOSIS — E11.42 TYPE 2 DIABETES MELLITUS WITH POLYNEUROPATHY (H): ICD-10-CM

## 2023-05-08 NOTE — TELEPHONE ENCOUNTER
Is due for an appointment.  Please assist in scheduling.  I will send short refill once patient has appointment scheduled.  Please do not route back to me until patient is scheduled.    Shira MAKC

## 2023-05-10 RX ORDER — DULAGLUTIDE 1.5 MG/.5ML
INJECTION, SOLUTION SUBCUTANEOUS
Qty: 2 ML | Refills: 0 | Status: SHIPPED | OUTPATIENT
Start: 2023-05-10 | End: 2023-05-30

## 2023-06-02 ENCOUNTER — TELEPHONE (OUTPATIENT)
Dept: PHARMACY | Facility: CLINIC | Age: 68
End: 2023-06-02
Payer: COMMERCIAL

## 2023-06-02 NOTE — TELEPHONE ENCOUNTER
We have been unable to reach this patient for MTM follow-up after several attempts. We will stop reaching out to the patient at this time. Please let us know if we can assist in this patient's care in the future.    Routing to PCP as FRANCIS.   Robbin Rivas, QasimD, BCACP  Medication Therapy Management Pharmacist  Pager: [phone]

## 2023-06-29 DIAGNOSIS — E11.42 TYPE 2 DIABETES MELLITUS WITH POLYNEUROPATHY (H): ICD-10-CM

## 2023-07-02 RX ORDER — DULAGLUTIDE 1.5 MG/.5ML
INJECTION, SOLUTION SUBCUTANEOUS
Qty: 2 ML | Refills: 0 | Status: SHIPPED | OUTPATIENT
Start: 2023-07-02 | End: 2023-07-12

## 2023-07-09 DIAGNOSIS — E11.42 TYPE 2 DIABETES MELLITUS WITH POLYNEUROPATHY (H): Primary | ICD-10-CM

## 2023-07-10 NOTE — PROGRESS NOTES
"  Assessment & Plan     Screen for colon cancer  Declines    Stage 3 chronic kidney disease, unspecified whether stage 3a or 3b CKD (H)  We will recheck here today.  Reinforced importance of drinking plenty of fluids daily and avoiding over-the-counter NSAIDs  - Hemoglobin; Future    Type 2 diabetes mellitus with polyneuropathy (H)  Frustrated with cost of medications.  We will plan to discontinue Jardiance and start glipizide.  We will discontinue Trulicity after supply at home runs out.  Once supply of Trulicity runs out increase Lantus insulin to 30 units nightly.  Check blood sugars daily and report in 2 months.  We will plan lab only appointment in 6 to 8 weeks to recheck renal function.  Plan diabetic check in office in 6 months.  - FOOT EXAM  - glipiZIDE (GLUCOTROL XL) 10 MG 24 hr tablet; Take 1 tablet (10 mg) by mouth daily  - insulin glargine (LANTUS SOLOSTAR) 100 UNIT/ML pen; Inject 20 Units Subcutaneous At Bedtime Increase to 30 units after stopping Trulicity    Paroxysmal atrial fibrillation (H)  Most recent cardiology notes reviewed.  Medications discontinued.  No signs of atrial fibrillation.  To follow-up with cardiology as needed.    Encouraged to go to pharmacy for Tdap vaccine, pneumonia vaccine and shingles vaccine.    Review of the result(s) of each unique test - labs  Ordering of each unique test  Prescription drug management  34 minutes spent by me on the date of the encounter doing chart review, history and exam, documentation and further activities per the note   BMI:   Estimated body mass index is 27.87 kg/m  as calculated from the following:    Height as of this encounter: 1.905 m (6' 3\").    Weight as of this encounter: 101.2 kg (223 lb).       LATHA Machuca CNP  [facility] JADIEL Rangel is a 68 year old, presenting for the following health issues:  Diabetes      HPI   Diabetes Follow-up      How often are you checking your blood sugar? Not at " all    What concerns do you have today about your diabetes? None     Do you have any of these symptoms? (Select all that apply)  No numbness or tingling in feet.  No redness, sores or blisters on feet.  No complaints of excessive thirst.  No reports of blurry vision.  No significant changes to weight.    Have you had a diabetic eye exam in the last 12 months? No        Hyperlipidemia Follow-Up      Are you regularly taking any medication or supplement to lower your cholesterol?   Yes- zocor    Are you having muscle aches or other side effects that you think could be caused by your cholesterol lowering medication?  No    Hypertension Follow-up      Do you check your blood pressure regularly outside of the clinic? Yes     Are you following a low salt diet? No    Are your blood pressures ever more than 140 on the top number (systolic) OR more   than 90 on the bottom number (diastolic), for example 140/90? No     *    BP Readings from Last 2 Encounters:   07/12/23 (!) 146/78   04/24/23 (!) 153/82     Hemoglobin A1C (%)   Date Value   10/03/2022 12.5 (H)   04/28/2022 9.1 (H)   04/01/2021 11.5 (H)   11/06/2019 9.1 (H)     LDL Cholesterol Calculated (mg/dL)   Date Value   10/03/2022 92   10/27/2021 60   04/01/2021 83   05/09/2019 67     Here today for diabetic check.  Is not checking blood sugars at home.  No longer able to afford freestyle kar sensors.  Is in the donut hole.  Is pain over $800 per month for Jardiance and Trulicity.  Very frustrated with cost of medications however, continues to purchase and take them.  No open areas on feet.  No numbness or tingling.  Was seen by cardiology and taken off of all atrial fib medications.  Is to follow-up with cardiology as needed.    Review of Systems   Constitutional: Negative for fatigue.   HENT: Negative for congestion, ear pain, rhinorrhea, sinus pressure and sore throat.    Respiratory: Negative for cough, shortness of breath and wheezing.    Cardiovascular: Negative  "for chest pain and palpitations.   Gastrointestinal: Negative for abdominal pain, constipation and diarrhea.   Genitourinary: Negative for dysuria and frequency.   Musculoskeletal: Negative for arthralgias and joint swelling.   Skin: Negative for rash.   Neurological: Negative for dizziness, light-headedness, numbness and headaches.   Psychiatric/Behavioral: Negative for dysphoric mood and sleep disturbance. The patient is not nervous/anxious.           Objective    BP (!) 146/78   Pulse 72   Temp 97.8  F (36.6  C) (Tympanic)   Resp 16   Ht 1.905 m (6' 3\")   Wt 101.2 kg (223 lb)   SpO2 97%   BMI 27.87 kg/m    Body mass index is 27.87 kg/m .  Physical Exam  Constitutional:       Appearance: He is well-developed.   HENT:      Right Ear: Tympanic membrane and external ear normal.      Left Ear: Tympanic membrane and external ear normal.      Mouth/Throat:      Pharynx: Uvula midline.   Neck:      Thyroid: No thyromegaly.      Vascular: No carotid bruit.   Cardiovascular:      Rate and Rhythm: Normal rate and regular rhythm.      Heart sounds: Normal heart sounds.   Pulmonary:      Effort: Pulmonary effort is normal.      Breath sounds: Normal breath sounds.   Abdominal:      General: Bowel sounds are normal.      Palpations: Abdomen is soft.   Skin:     General: Skin is warm and dry.   Neurological:      Mental Status: He is alert.      Diabetic foot exam: normal DP and PT pulses, no trophic changes or ulcerative lesions and normal sensory exam            "

## 2023-07-12 ENCOUNTER — OFFICE VISIT (OUTPATIENT)
Dept: FAMILY MEDICINE | Facility: CLINIC | Age: 68
End: 2023-07-12
Payer: COMMERCIAL

## 2023-07-12 VITALS
HEIGHT: 75 IN | SYSTOLIC BLOOD PRESSURE: 132 MMHG | DIASTOLIC BLOOD PRESSURE: 70 MMHG | HEART RATE: 72 BPM | BODY MASS INDEX: 27.73 KG/M2 | TEMPERATURE: 97.8 F | RESPIRATION RATE: 16 BRPM | WEIGHT: 223 LBS | OXYGEN SATURATION: 97 %

## 2023-07-12 DIAGNOSIS — Z12.11 SCREEN FOR COLON CANCER: ICD-10-CM

## 2023-07-12 DIAGNOSIS — E11.42 TYPE 2 DIABETES MELLITUS WITH POLYNEUROPATHY (H): ICD-10-CM

## 2023-07-12 DIAGNOSIS — N18.30 STAGE 3 CHRONIC KIDNEY DISEASE, UNSPECIFIED WHETHER STAGE 3A OR 3B CKD (H): ICD-10-CM

## 2023-07-12 DIAGNOSIS — I48.0 PAROXYSMAL ATRIAL FIBRILLATION (H): Primary | ICD-10-CM

## 2023-07-12 PROCEDURE — 99207 PR FOOT EXAM NO CHARGE: CPT | Performed by: NURSE PRACTITIONER

## 2023-07-12 PROCEDURE — 99214 OFFICE O/P EST MOD 30 MIN: CPT | Performed by: NURSE PRACTITIONER

## 2023-07-12 RX ORDER — GLIPIZIDE 10 MG/1
10 TABLET, FILM COATED, EXTENDED RELEASE ORAL DAILY
Qty: 90 TABLET | Refills: 1 | Status: SHIPPED | OUTPATIENT
Start: 2023-07-12 | End: 2023-12-31

## 2023-07-12 RX ORDER — INSULIN GLARGINE 100 [IU]/ML
20 INJECTION, SOLUTION SUBCUTANEOUS AT BEDTIME
Qty: 30 ML | Refills: 1 | Status: SHIPPED | OUTPATIENT
Start: 2023-07-12 | End: 2023-08-28

## 2023-07-12 ASSESSMENT — ENCOUNTER SYMPTOMS
NERVOUS/ANXIOUS: 0
ABDOMINAL PAIN: 0
WHEEZING: 0
DYSURIA: 0
FATIGUE: 0
DIZZINESS: 0
RHINORRHEA: 0
COUGH: 0
CONSTIPATION: 0
JOINT SWELLING: 0
SLEEP DISTURBANCE: 0
LIGHT-HEADEDNESS: 0
SINUS PRESSURE: 0
NUMBNESS: 0
SHORTNESS OF BREATH: 0
HEADACHES: 0
DYSPHORIC MOOD: 0
ARTHRALGIAS: 0
PALPITATIONS: 0
DIARRHEA: 0
SORE THROAT: 0
FREQUENCY: 0

## 2023-07-12 ASSESSMENT — PAIN SCALES - GENERAL: PAINLEVEL: NO PAIN (0)

## 2023-07-12 NOTE — PATIENT INSTRUCTIONS
Please go to the pharmacy for your tetanus vaccine.     Stop Jardiance and start glipizide in its place    Stop Trulicity-after your supply at home is gone    Increase lantus to 30 units nightly after your stop the trulicity     Check blood sugars ideally daily and then report them in 2 months    Plan lab only appointment in 6-8 weeks to recheck your kidney function.    Plan follow-up appointment for diabetic check in 6 months.

## 2023-08-28 ENCOUNTER — TELEPHONE (OUTPATIENT)
Dept: FAMILY MEDICINE | Facility: CLINIC | Age: 68
End: 2023-08-28
Payer: COMMERCIAL

## 2023-08-28 DIAGNOSIS — E11.42 TYPE 2 DIABETES MELLITUS WITH POLYNEUROPATHY (H): ICD-10-CM

## 2023-08-28 RX ORDER — INSULIN GLARGINE 100 [IU]/ML
20 INJECTION, SOLUTION SUBCUTANEOUS AT BEDTIME
Qty: 30 ML | Refills: 1 | Status: SHIPPED | OUTPATIENT
Start: 2023-08-28 | End: 2024-02-01

## 2023-08-28 NOTE — TELEPHONE ENCOUNTER
"insulin glargine (LANTUS SOLOSTAR) 100 UNIT/ML pen Sig - Route: Inject 20 Units Subcutaneous At Bedtime Increase to 30 units after stopping Trulicity     Patient stated he is taking 30 units per day and he is paying $105 for a 60 day supply.     He is requesting Shira to put through a script that states \"take 32 units per day\" (vs 30 unit per day) Because he would pay the same price for a 90 day script and states he would still only take 30 units per day.     .....    RN called [facility] to clarify. Elizabeth : Pharmacist.     She confirmed script may state \"use up to 32 units per day\"  Write for 30 ML for quantity And he can get two boxes for the same mancilla.     Nay Lawson RN on 8/28/2023 at 9:32 AM    "

## 2023-09-16 ENCOUNTER — HEALTH MAINTENANCE LETTER (OUTPATIENT)
Age: 68
End: 2023-09-16

## 2023-10-04 DIAGNOSIS — E78.5 HYPERLIPIDEMIA LDL GOAL <100: ICD-10-CM

## 2023-10-04 RX ORDER — SIMVASTATIN 40 MG
TABLET ORAL
Qty: 90 TABLET | Refills: 1 | Status: SHIPPED | OUTPATIENT
Start: 2023-10-04 | End: 2024-02-21

## 2023-10-19 DIAGNOSIS — E11.42 TYPE 2 DIABETES MELLITUS WITH POLYNEUROPATHY (H): ICD-10-CM

## 2023-10-19 RX ORDER — FLURBIPROFEN SODIUM 0.3 MG/ML
SOLUTION/ DROPS OPHTHALMIC
Qty: 100 EACH | Refills: 1 | Status: SHIPPED | OUTPATIENT
Start: 2023-10-19 | End: 2024-02-21

## 2023-12-15 ENCOUNTER — OFFICE VISIT (OUTPATIENT)
Dept: CARDIOLOGY | Facility: CLINIC | Age: 68
End: 2023-12-15
Payer: COMMERCIAL

## 2023-12-15 VITALS
BODY MASS INDEX: 30.64 KG/M2 | HEART RATE: 58 BPM | SYSTOLIC BLOOD PRESSURE: 136 MMHG | WEIGHT: 246.4 LBS | OXYGEN SATURATION: 98 % | DIASTOLIC BLOOD PRESSURE: 80 MMHG | RESPIRATION RATE: 12 BRPM | HEIGHT: 75 IN

## 2023-12-15 DIAGNOSIS — I44.1 MOBITZ TYPE 2 SECOND DEGREE HEART BLOCK: ICD-10-CM

## 2023-12-15 DIAGNOSIS — I10 BENIGN ESSENTIAL HYPERTENSION: ICD-10-CM

## 2023-12-15 DIAGNOSIS — Z86.79 HISTORY OF ATRIAL FIBRILLATION: Primary | ICD-10-CM

## 2023-12-15 DIAGNOSIS — R53.83 FATIGUE, UNSPECIFIED TYPE: ICD-10-CM

## 2023-12-15 DIAGNOSIS — E78.5 HYPERLIPIDEMIA LDL GOAL <100: ICD-10-CM

## 2023-12-15 LAB
ALT SERPL W P-5'-P-CCNC: 32 U/L (ref 0–70)
ANION GAP SERPL CALCULATED.3IONS-SCNC: 11 MMOL/L (ref 7–15)
BUN SERPL-MCNC: 21.4 MG/DL (ref 8–23)
CALCIUM SERPL-MCNC: 9.5 MG/DL (ref 8.8–10.2)
CHLORIDE SERPL-SCNC: 99 MMOL/L (ref 98–107)
CHOLEST SERPL-MCNC: 185 MG/DL
CREAT SERPL-MCNC: 1.49 MG/DL (ref 0.67–1.17)
DEPRECATED HCO3 PLAS-SCNC: 25 MMOL/L (ref 22–29)
EGFRCR SERPLBLD CKD-EPI 2021: 51 ML/MIN/1.73M2
FASTING STATUS PATIENT QL REPORTED: NO
GLUCOSE SERPL-MCNC: 406 MG/DL (ref 70–99)
HDLC SERPL-MCNC: 51 MG/DL
HGB BLD-MCNC: 14.9 G/DL (ref 13.3–17.7)
LDLC SERPL CALC-MCNC: 101 MG/DL
NONHDLC SERPL-MCNC: 134 MG/DL
POTASSIUM SERPL-SCNC: 4.4 MMOL/L (ref 3.4–5.3)
SODIUM SERPL-SCNC: 135 MMOL/L (ref 135–145)
TRIGL SERPL-MCNC: 163 MG/DL
TSH SERPL DL<=0.005 MIU/L-ACNC: 3.23 UIU/ML (ref 0.3–4.2)

## 2023-12-15 PROCEDURE — 99214 OFFICE O/P EST MOD 30 MIN: CPT | Performed by: NURSE PRACTITIONER

## 2023-12-15 PROCEDURE — 80048 BASIC METABOLIC PNL TOTAL CA: CPT | Performed by: NURSE PRACTITIONER

## 2023-12-15 PROCEDURE — 84460 ALANINE AMINO (ALT) (SGPT): CPT | Performed by: NURSE PRACTITIONER

## 2023-12-15 PROCEDURE — 85018 HEMOGLOBIN: CPT | Performed by: NURSE PRACTITIONER

## 2023-12-15 PROCEDURE — 36415 COLL VENOUS BLD VENIPUNCTURE: CPT | Performed by: NURSE PRACTITIONER

## 2023-12-15 PROCEDURE — 80061 LIPID PANEL: CPT | Performed by: NURSE PRACTITIONER

## 2023-12-15 PROCEDURE — 84443 ASSAY THYROID STIM HORMONE: CPT | Performed by: NURSE PRACTITIONER

## 2023-12-15 ASSESSMENT — PAIN SCALES - GENERAL: PAINLEVEL: NO PAIN (0)

## 2023-12-15 NOTE — PATIENT INSTRUCTIONS
Medication Changes:  None     Recommendations:  Check blood pressure at least 1 hour after medications. Call the clinic if your blood pressure is consistently greater than 130/80.     Follow-up:  Cardiology follow up at [facility]: jaye in 1 month.   TSH, Hgb, BMP, Lipids, ALT    Cardiology Scheduling~[phone]  Cardiology Clinic RN~[phone] (Teagan RN, Rani RN)

## 2023-12-15 NOTE — RESULT ENCOUNTER NOTE
Lipids trending up from 2 years ago; electrolytes, TSH and ALT WNL; creatinine elevated but improved from previous. Will discuss with Jeana Gamboa NP for recommendations as she saw pt this morning.

## 2023-12-15 NOTE — LETTER
12/15/2023    Shira eDnson, APRN CNP  [address] [facility]  [place] [address]    RE: Juan Carlos Rizo       Dear Colleague,     I had the pleasure of seeing Juan Carlos Rizo in the [facility].  Cardiology Clinic Progress Note  Juan Carlos Rizo MRN# [id number]   YOB: 1955 Age: 68 year old      Primary Cardiologist:   Dr. Fish  Patient presents today for fatigue, MARMOLEJO        History of Presenting Illness:      Juan Carlos Rizo is a pleasant 68 year old patient with a past cardiac history significant for   Atrial fibrillation  Onset 1/2022 in context of COVID  No A. fib on follow-up monitor 10/2022  Xarelto and beta-blocker discontinued  Hypertension  Second-degree AV block  Improved after discontinuing beta-blocker  Past medical history significant for diabetes, CKD, COVID in 2022      Echocardiogram 2022 at [facility] showed EF 55%, normal RV, no valvular disease.     Patient was seen by Dr. Fish in April 2023 for routine follow-up.  He had generalized fatigue was otherwise doing well.  He declined wearing Holter monitor at that time.    Most recent lipid profile, BMP, ALT, TSH, hemoglobin reviewed today.  He reports fatigue and dyspnea on exertion that has worsened.  He feels like he cannot get a deep enough breath.  He is unsure if this is long-haul COVID.  He notes that after his hospitalization in 2022 for COVID his fatigue did improve and felt better over the summer however it has now returned.  He reports having interrupted sleep frequently but no concerns for sleep apnea.  He is unsure if this is contributing to his fatigue.  I recommended trying to sleep in an area where he would not be having interrupted sleep from his cat, to see if this helps.  His weight is up 15 pounds in the last 9 months.  Oxygen saturation is 98%.  We discussed comprehensive workup but he prefers to start with labs only. Patient reports no chest pain, PND, orthopnea, presyncope, syncope, edema,  heart racing, or palpitations.                   Assessment and Plan:       Plan  Patient Instructions   Medication Changes:  None     Recommendations:  Check blood pressure at least 1 hour after medications. Call the clinic if your blood pressure is consistently greater than 130/80.     Follow-up:  Cardiology follow up at [facility]: jaye in 1 month.   TSH, Hgb, BMP, Lipids, ALT-if these are normal consider chest x-ray, heart monitor, echo, trial of Lasix.    Cardiology Scheduling~[phone]  Cardiology Clinic RN~[phone] (Teagan RN, Rani RN)             History of atrial fibrillation  Benign essential hypertension  Mobitz type 2 second degree heart block  Fatigue, unspecified type  Hyperlipidemia LDL goal <100      Atrial fibrillation  Secondary to COVID  No further atrial fibrillation noted on monitors  No need for anticoagulation        Hypertension  Controlled  Declined antihypertensives  Consider starting amlodipine        Second-degree AV block  Noted on heart monitor 10/2022-while on metoprolol  Beta-blocker discontinued  Continued with second-degree AV block with pause of 1.8 seconds at 2 AM and no symptoms  Avoid AV fawn blocking agents  Monitor periodically       Fatigue  Monitor to assess for bradycardia and afib  labs  Consider CXR, heart monitor, echo, trial of lasix        Respiratory:  clear to auscultation; normal symmetry        Cardiac: regular rate and rhythm     GI:  nondistended     Extremities and Muscular Skeletal:   no edema            Thank you for allowing me to participate in this delightful patient's care.   This note was completed in part using Dragon voice recognition software. Although reviewed after completion, some word and grammatical errors may occur.    LATHA Barragan CNP      Thank you for allowing me to participate in the care of your patient.      Sincerely,     LATHA Barragan CNP     [facility]  [facility]  cc:   No referring provider defined for this encounter.

## 2023-12-15 NOTE — PROGRESS NOTES
Cardiology Clinic Progress Note  Juan Carlos Rizo MRN# [id number]   YOB: 1955 Age: 68 year old      Primary Cardiologist:   Dr. Fish  Patient presents today for fatigue, MARMOLEJO        History of Presenting Illness:      Juan Carlos Rizo is a pleasant 68 year old patient with a past cardiac history significant for   Atrial fibrillation  Onset 1/2022 in context of COVID  No A. fib on follow-up monitor 10/2022  Xarelto and beta-blocker discontinued  Hypertension  Second-degree AV block  Improved after discontinuing beta-blocker  Past medical history significant for diabetes, CKD, COVID in 2022      Echocardiogram 2022 at [facility] showed EF 55%, normal RV, no valvular disease.     Patient was seen by Dr. Fish in April 2023 for routine follow-up.  He had generalized fatigue was otherwise doing well.  He declined wearing Holter monitor at that time.    Most recent lipid profile, BMP, ALT, TSH, hemoglobin reviewed today.  He reports fatigue and dyspnea on exertion that has worsened.  He feels like he cannot get a deep enough breath.  He is unsure if this is long-haul COVID.  He notes that after his hospitalization in 2022 for COVID his fatigue did improve and felt better over the summer however it has now returned.  He reports having interrupted sleep frequently but no concerns for sleep apnea.  He is unsure if this is contributing to his fatigue.  I recommended trying to sleep in an area where he would not be having interrupted sleep from his cat, to see if this helps.  His weight is up 15 pounds in the last 9 months.  Oxygen saturation is 98%.  We discussed comprehensive workup but he prefers to start with labs only. Patient reports no chest pain, PND, orthopnea, presyncope, syncope, edema, heart racing, or palpitations.                   Assessment and Plan:       Plan  Patient Instructions   Medication Changes:  None     Recommendations:  Check blood pressure at least 1 hour after medications. Call the  clinic if your blood pressure is consistently greater than 130/80.     Follow-up:  Cardiology follow up at [facility]: jeana in 1 month.   TSH, Hgb, BMP, Lipids, ALT-if these are normal consider chest x-ray, heart monitor, echo, trial of Lasix.    Cardiology Scheduling~[phone]  Cardiology Clinic RN~[phone] (Teagan RN, Rani RN)             History of atrial fibrillation  Benign essential hypertension  Mobitz type 2 second degree heart block  Fatigue, unspecified type  Hyperlipidemia LDL goal <100      Atrial fibrillation  Secondary to COVID  No further atrial fibrillation noted on monitors  No need for anticoagulation        Hypertension  Controlled  Declined antihypertensives  Consider starting amlodipine        Second-degree AV block  Noted on heart monitor 10/2022-while on metoprolol  Beta-blocker discontinued  Continued with second-degree AV block with pause of 1.8 seconds at 2 AM and no symptoms  Avoid AV fawn blocking agents  Monitor periodically       Fatigue  Monitor to assess for bradycardia and afib  labs  Consider CXR, heart monitor, echo, trial of lasix        Respiratory:  clear to auscultation; normal symmetry        Cardiac: regular rate and rhythm     GI:  nondistended     Extremities and Muscular Skeletal:   no edema            Thank you for allowing me to participate in this delightful patient's care.   This note was completed in part using Dragon voice recognition software. Although reviewed after completion, some word and grammatical errors may occur.    Jeana Spann, LATHA CNP

## 2023-12-28 DIAGNOSIS — E11.42 TYPE 2 DIABETES MELLITUS WITH POLYNEUROPATHY (H): ICD-10-CM

## 2023-12-31 RX ORDER — GLIPIZIDE 10 MG/1
10 TABLET, FILM COATED, EXTENDED RELEASE ORAL DAILY
Qty: 90 TABLET | Refills: 0 | Status: SHIPPED | OUTPATIENT
Start: 2023-12-31

## 2024-01-18 ENCOUNTER — OFFICE VISIT (OUTPATIENT)
Dept: CARDIOLOGY | Facility: CLINIC | Age: 69
End: 2024-01-18
Payer: COMMERCIAL

## 2024-01-18 VITALS
OXYGEN SATURATION: 97 % | DIASTOLIC BLOOD PRESSURE: 68 MMHG | RESPIRATION RATE: 16 BRPM | SYSTOLIC BLOOD PRESSURE: 136 MMHG | BODY MASS INDEX: 30.24 KG/M2 | HEIGHT: 75 IN | WEIGHT: 243.2 LBS | HEART RATE: 76 BPM

## 2024-01-18 DIAGNOSIS — R06.09 DOE (DYSPNEA ON EXERTION): ICD-10-CM

## 2024-01-18 DIAGNOSIS — Z86.79 HISTORY OF ATRIAL FIBRILLATION: ICD-10-CM

## 2024-01-18 DIAGNOSIS — I44.1 MOBITZ TYPE 2 SECOND DEGREE HEART BLOCK: ICD-10-CM

## 2024-01-18 DIAGNOSIS — R53.83 FATIGUE, UNSPECIFIED TYPE: ICD-10-CM

## 2024-01-18 DIAGNOSIS — I48.0 PAROXYSMAL ATRIAL FIBRILLATION (H): Primary | ICD-10-CM

## 2024-01-18 DIAGNOSIS — I10 BENIGN ESSENTIAL HYPERTENSION: ICD-10-CM

## 2024-01-18 PROCEDURE — 99214 OFFICE O/P EST MOD 30 MIN: CPT | Performed by: NURSE PRACTITIONER

## 2024-01-18 NOTE — PATIENT INSTRUCTIONS
Medication Changes:  None     Recommendations:  Check blood pressure at least 1 hour after medications. Call the clinic if your blood pressure is consistently greater than 130/80.   Call if blood pressure is less than 90 on top or less than 100 with lightheadedness.    Call if heart rate is less than 50 or less than 55 and lightheaded.     Follow-up:  Cardiology follow up at [facility]: jaye in 1 month.   7 day zio patch to look for more afib   Follow-up with primary care about shortness of breath and inhaler     Cardiology Scheduling~[phone]  Cardiology Clinic RN~[phone] (Teagan RN, Rani RN; She RN)

## 2024-01-18 NOTE — LETTER
1/18/2024    Shira Denson, APRN CNP  [address] [facility]  [place] [address]    RE: Juan Carlos Rizo       Dear Colleague,     I had the pleasure of seeing Juan Carlos Rizo in the [facility].  Cardiology Clinic Progress Note  Juan Carlos Rizo MRN# [id number]   YOB: 1955 Age: 68 year old      Primary Cardiologist:   Dr. Fish  Patient presents today for 1 month follow-up        History of Presenting Illness:      Juan Carlos Rizo is a pleasant 68 year old patient with a past cardiac history significant for   Paroxysmal atrial fibrillation  Onset 1/2022 in context of COVID  No A. fib on follow-up monitor 10/2022  Xarelto and beta-blocker (due to second-degree AV block) discontinued  Declined follow-up monitor 2023  Hypertension  Second-degree AV block  Improved after discontinuing beta-blocker  Past medical history significant for asthma, diabetes, CKD, COVID in 2022        Echocardiogram 2022 at [facility] showed EF 55%, normal RV, no valvular disease.    Patient was seen by me in December 2023 for fatigue and dyspnea on exertion.  After his hospitalization in 2022 for COVID his fatigue improved and felt better over the summer but felt symptoms were returning.  He admitted to having interrupted sleep frequently but no concerns for sleep apnea.  Weight was up 15 pounds in 9 months.  He was agreeable to starting with lab work for workup.    Most recent lipid profile, BMP, TSH, ALT, hemoglobin reviewed today.  He continues with dyspnea on exertion but notes that this occurs intermittently.  He describes having asthma when he was younger, which was improved with inhaler.  He states that this feels similar.  He has never had PFTs and I recommended following up with PCP.  On exam, he had a irregular rhythm likely related to PACs/PVCs.  He is agreeable to Zio patch to see if he is having any further atrial fibrillation that may be contributing to his symptoms.  Blood pressure is controlled.   Weight is down 3 pounds. Patient reports no chest pain, PND, orthopnea, presyncope, syncope, edema, heart racing, or palpitations.                   Assessment and Plan:       Plan  Patient Instructions   Medication Changes:  None     Recommendations:  Check blood pressure at least 1 hour after medications. Call the clinic if your blood pressure is consistently greater than 130/80.   Call if blood pressure is less than 90 on top or less than 100 with lightheadedness.    Call if heart rate is less than 50 or less than 55 and lightheaded.     Follow-up:  Cardiology follow up at [facility]: jaye in 1 month. -Consider chest x-ray, echo, Lasix, if needed  7 day zio patch to look for more afib   Follow-up with primary care about shortness of breath and inhaler     Cardiology Scheduling~[phone]  Cardiology Clinic RN~[phone] (Teagan RN, Rani RN; She RN)            Paroxysmal atrial fibrillation (H)  No known recurrence  Elevated [id number] score (age, hypertension, diabetes)  Reassess with Zio patch    Fatigue and MARMOLEJO (dyspnea on exertion)  TSH and hemoglobin WNL  Zio patch             Respiratory:  clear to auscultation; normal symmetry        Cardiac: regular rate and rhythm     GI:  nondistended     Extremities and Muscular Skeletal:   no edema            Thank you for allowing me to participate in this delightful patient's care.   This note was completed in part using Dragon voice recognition software. Although reviewed after completion, some word and grammatical errors may occur.    LATHA Barragan CNP    Thank you for allowing me to participate in the care of your patient.      Sincerely,     LATHA Barragan CNP     [facility]  cc:   LATHA Hendrix CNP  [address] [place] [address]

## 2024-01-18 NOTE — PROGRESS NOTES
Cardiology Clinic Progress Note  Juan Carlos Rizo MRN# [id number]   YOB: 1955 Age: 68 year old      Primary Cardiologist:   Dr. Fish  Patient presents today for 1 month follow-up        History of Presenting Illness:      Juan Carlos Rizo is a pleasant 68 year old patient with a past cardiac history significant for   Paroxysmal atrial fibrillation  Onset 1/2022 in context of COVID  No A. fib on follow-up monitor 10/2022  Xarelto and beta-blocker (due to second-degree AV block) discontinued  Declined follow-up monitor 2023  Hypertension  Second-degree AV block  Improved after discontinuing beta-blocker  Past medical history significant for asthma, diabetes, CKD, COVID in 2022        [facility] showed EF [facility]%, normal RV, no valvular disease.    Patient was seen by me in December 2023 for fatigue and dyspnea on exertion.  After his hospitalization in 2022 for COVID his fatigue improved and felt better over the summer but felt symptoms were returning.  He admitted to having interrupted sleep frequently but no concerns for sleep apnea.  Weight was up 15 pounds in 9 months.  He was agreeable to starting with lab work for workup.    Most recent lipid profile, BMP, TSH, ALT, hemoglobin reviewed today.  He continues with dyspnea on exertion but notes that this occurs intermittently.  He describes having asthma when he was younger, which was improved with inhaler.  He states that this feels similar.  He has never had PFTs and I recommended following up with PCP.  On exam, he had a irregular rhythm likely related to PACs/PVCs.  He is agreeable to Zio patch to see if he is having any further atrial fibrillation that may be contributing to his symptoms.  Blood pressure is controlled.  Weight is down 3 pounds. Patient reports no chest pain, PND, orthopnea, presyncope, syncope, edema, heart racing, or palpitations.                   Assessment and Plan:       Plan  Patient Instructions    Medication Changes:  None     Recommendations:  Check blood pressure at least 1 hour after medications. Call the clinic if your blood pressure is consistently greater than 130/80.   Call if blood pressure is less than 90 on top or less than 100 with lightheadedness.    Call if heart rate is less than 50 or less than 55 and lightheaded.     Follow-up:  Cardiology follow up at [facility]: jeana in 1 month. -Consider chest x-ray, echo, Lasix, if needed  7 day zio patch to look for more afib - He is unsure if he wants this mailed out or if he wants this applied in the clinic.  He will call and let us know so that we can order this appropriately.  Follow-up with primary care about shortness of breath and inhaler     Cardiology Scheduling~[phone]  Cardiology Clinic RN~[phone] (Teagan RN, Rani RN; She RN)            Paroxysmal atrial fibrillation (H)  No known recurrence  Elevated [id number] score (age, hypertension, diabetes)  Reassess with Zio patch    Fatigue and MARMOLEJO (dyspnea on exertion)  TSH and hemoglobin WNL  Zio patch             Respiratory:  clear to auscultation; normal symmetry        Cardiac: regular rate and rhythm     GI:  nondistended     Extremities and Muscular Skeletal:   no edema            Thank you for allowing me to participate in this delightful patient's care.   This note was completed in part using Dragon voice recognition software. Although reviewed after completion, some word and grammatical errors may occur.    Jeana Spann, LATHA CNP

## 2024-01-23 NOTE — TELEPHONE ENCOUNTER
Patient transferred to nurse line. He is wanting to be seen sooner than 6/28/21 for a possible return of his foot infection. Nurse assisted with scheduling him with a same day provider tomorrow morning.    Destiny Fuller RN     
Reason for call:  Other   Patient called regarding (reason for call): prescription  Additional comments: Pt calling wanting an antibiotic for infection for blister that popped. Pt complaining of redness, swelling, and pain. Sending pt to nurses for further instruction.    Phone number to reach patient:  Home number on file [phone] (home)    Best Time:  anytime    Can we leave a detailed message on this number?  YES    Travel screening: Not Applicable    
Ambulatory

## 2024-02-01 DIAGNOSIS — E11.42 TYPE 2 DIABETES MELLITUS WITH POLYNEUROPATHY (H): ICD-10-CM

## 2024-02-01 RX ORDER — INSULIN GLARGINE 100 [IU]/ML
INJECTION, SOLUTION SUBCUTANEOUS
Qty: 30 ML | Refills: 1 | OUTPATIENT
Start: 2024-02-01

## 2024-02-01 RX ORDER — INSULIN GLARGINE 100 [IU]/ML
20 INJECTION, SOLUTION SUBCUTANEOUS AT BEDTIME
Qty: 30 ML | Refills: 0 | Status: SHIPPED | OUTPATIENT
Start: 2024-02-01 | End: 2024-07-24

## 2024-02-03 ENCOUNTER — HEALTH MAINTENANCE LETTER (OUTPATIENT)
Age: 69
End: 2024-02-03

## 2024-02-12 DIAGNOSIS — E11.42 TYPE 2 DIABETES MELLITUS WITH POLYNEUROPATHY (H): Primary | ICD-10-CM

## 2024-02-21 ENCOUNTER — OFFICE VISIT (OUTPATIENT)
Dept: FAMILY MEDICINE | Facility: CLINIC | Age: 69
End: 2024-02-21
Payer: COMMERCIAL

## 2024-02-21 VITALS
OXYGEN SATURATION: 99 % | TEMPERATURE: 97.6 F | WEIGHT: 237 LBS | SYSTOLIC BLOOD PRESSURE: 138 MMHG | HEIGHT: 75 IN | BODY MASS INDEX: 29.47 KG/M2 | RESPIRATION RATE: 16 BRPM | DIASTOLIC BLOOD PRESSURE: 88 MMHG | HEART RATE: 67 BPM

## 2024-02-21 DIAGNOSIS — N18.30 STAGE 3 CHRONIC KIDNEY DISEASE, UNSPECIFIED WHETHER STAGE 3A OR 3B CKD (H): ICD-10-CM

## 2024-02-21 DIAGNOSIS — E78.5 HYPERLIPIDEMIA LDL GOAL <100: ICD-10-CM

## 2024-02-21 DIAGNOSIS — E11.42 TYPE 2 DIABETES MELLITUS WITH POLYNEUROPATHY (H): Primary | ICD-10-CM

## 2024-02-21 PROBLEM — N18.32 STAGE 3B CHRONIC KIDNEY DISEASE (H): Status: ACTIVE | Noted: 2021-04-01

## 2024-02-21 LAB
ALBUMIN SERPL BCG-MCNC: 4.2 G/DL (ref 3.5–5.2)
ALP SERPL-CCNC: 87 U/L (ref 40–150)
ALT SERPL W P-5'-P-CCNC: 30 U/L (ref 0–70)
ANION GAP SERPL CALCULATED.3IONS-SCNC: 12 MMOL/L (ref 7–15)
AST SERPL W P-5'-P-CCNC: 21 U/L (ref 0–45)
BASOPHILS # BLD AUTO: 0 10E3/UL (ref 0–0.2)
BASOPHILS NFR BLD AUTO: 1 %
BILIRUB SERPL-MCNC: 0.4 MG/DL
BUN SERPL-MCNC: 16.8 MG/DL (ref 8–23)
CALCIUM SERPL-MCNC: 9.7 MG/DL (ref 8.8–10.2)
CHLORIDE SERPL-SCNC: 99 MMOL/L (ref 98–107)
CHOLEST SERPL-MCNC: 197 MG/DL
CREAT SERPL-MCNC: 1.48 MG/DL (ref 0.67–1.17)
CREAT UR-MCNC: 132 MG/DL
DEPRECATED HCO3 PLAS-SCNC: 24 MMOL/L (ref 22–29)
EGFRCR SERPLBLD CKD-EPI 2021: 52 ML/MIN/1.73M2
EOSINOPHIL # BLD AUTO: 0.3 10E3/UL (ref 0–0.7)
EOSINOPHIL NFR BLD AUTO: 4 %
ERYTHROCYTE [DISTWIDTH] IN BLOOD BY AUTOMATED COUNT: 12.6 % (ref 10–15)
FASTING STATUS PATIENT QL REPORTED: YES
GLUCOSE SERPL-MCNC: 350 MG/DL (ref 70–99)
HBA1C MFR BLD: 14.4 % (ref 0–5.6)
HCT VFR BLD AUTO: 47.3 % (ref 40–53)
HDLC SERPL-MCNC: 49 MG/DL
HGB BLD-MCNC: 15.7 G/DL (ref 13.3–17.7)
IMM GRANULOCYTES # BLD: 0.1 10E3/UL
IMM GRANULOCYTES NFR BLD: 1 %
LDLC SERPL CALC-MCNC: 105 MG/DL
LYMPHOCYTES # BLD AUTO: 1.5 10E3/UL (ref 0.8–5.3)
LYMPHOCYTES NFR BLD AUTO: 23 %
MCH RBC QN AUTO: 28.3 PG (ref 26.5–33)
MCHC RBC AUTO-ENTMCNC: 33.2 G/DL (ref 31.5–36.5)
MCV RBC AUTO: 85 FL (ref 78–100)
MICROALBUMIN UR-MCNC: 245 MG/L
MICROALBUMIN/CREAT UR: 185.61 MG/G CR (ref 0–17)
MONOCYTES # BLD AUTO: 0.6 10E3/UL (ref 0–1.3)
MONOCYTES NFR BLD AUTO: 9 %
NEUTROPHILS # BLD AUTO: 4 10E3/UL (ref 1.6–8.3)
NEUTROPHILS NFR BLD AUTO: 62 %
NONHDLC SERPL-MCNC: 148 MG/DL
PLATELET # BLD AUTO: 161 10E3/UL (ref 150–450)
POTASSIUM SERPL-SCNC: 4.6 MMOL/L (ref 3.4–5.3)
PROT SERPL-MCNC: 7.1 G/DL (ref 6.4–8.3)
RBC # BLD AUTO: 5.54 10E6/UL (ref 4.4–5.9)
SODIUM SERPL-SCNC: 135 MMOL/L (ref 135–145)
TRIGL SERPL-MCNC: 216 MG/DL
WBC # BLD AUTO: 6.4 10E3/UL (ref 4–11)

## 2024-02-21 PROCEDURE — 85025 COMPLETE CBC W/AUTO DIFF WBC: CPT | Performed by: NURSE PRACTITIONER

## 2024-02-21 PROCEDURE — 36415 COLL VENOUS BLD VENIPUNCTURE: CPT | Performed by: NURSE PRACTITIONER

## 2024-02-21 PROCEDURE — 82043 UR ALBUMIN QUANTITATIVE: CPT | Performed by: NURSE PRACTITIONER

## 2024-02-21 PROCEDURE — 99214 OFFICE O/P EST MOD 30 MIN: CPT | Performed by: NURSE PRACTITIONER

## 2024-02-21 PROCEDURE — 80061 LIPID PANEL: CPT | Performed by: NURSE PRACTITIONER

## 2024-02-21 PROCEDURE — 80053 COMPREHEN METABOLIC PANEL: CPT | Performed by: NURSE PRACTITIONER

## 2024-02-21 PROCEDURE — 82570 ASSAY OF URINE CREATININE: CPT | Performed by: NURSE PRACTITIONER

## 2024-02-21 PROCEDURE — 83036 HEMOGLOBIN GLYCOSYLATED A1C: CPT | Performed by: NURSE PRACTITIONER

## 2024-02-21 RX ORDER — FLURBIPROFEN SODIUM 0.3 MG/ML
SOLUTION/ DROPS OPHTHALMIC
Qty: 100 EACH | Refills: 1 | Status: SHIPPED | OUTPATIENT
Start: 2024-02-21

## 2024-02-21 RX ORDER — SIMVASTATIN 40 MG
TABLET ORAL
Qty: 90 TABLET | Refills: 3 | Status: SHIPPED | OUTPATIENT
Start: 2024-02-21

## 2024-02-21 RX ORDER — GLIPIZIDE 10 MG/1
20 TABLET, FILM COATED, EXTENDED RELEASE ORAL DAILY
Qty: 180 TABLET | Refills: 1 | Status: SHIPPED | OUTPATIENT
Start: 2024-02-21 | End: 2024-08-13

## 2024-02-21 RX ORDER — RESPIRATORY SYNCYTIAL VIRUS VACCINE 120MCG/0.5
0.5 KIT INTRAMUSCULAR ONCE
Qty: 1 EACH | Refills: 0 | Status: CANCELLED | OUTPATIENT
Start: 2024-02-21 | End: 2024-02-21

## 2024-02-21 RX ORDER — INSULIN GLARGINE 100 [IU]/ML
20 INJECTION, SOLUTION SUBCUTANEOUS AT BEDTIME
Qty: 30 ML | Refills: 0 | Status: CANCELLED | OUTPATIENT
Start: 2024-02-21

## 2024-02-21 RX ORDER — DAPAGLIFLOZIN 10 MG/1
10 TABLET, FILM COATED ORAL DAILY
Qty: 90 TABLET | Refills: 1 | Status: SHIPPED | OUTPATIENT
Start: 2024-02-21

## 2024-02-21 RX ORDER — GLIPIZIDE 10 MG/1
10 TABLET, FILM COATED, EXTENDED RELEASE ORAL DAILY
Qty: 90 TABLET | Refills: 0 | Status: CANCELLED | OUTPATIENT
Start: 2024-02-21

## 2024-02-21 ASSESSMENT — PATIENT HEALTH QUESTIONNAIRE - PHQ9
10. IF YOU CHECKED OFF ANY PROBLEMS, HOW DIFFICULT HAVE THESE PROBLEMS MADE IT FOR YOU TO DO YOUR WORK, TAKE CARE OF THINGS AT HOME, OR GET ALONG WITH OTHER PEOPLE: NOT DIFFICULT AT ALL
SUM OF ALL RESPONSES TO PHQ QUESTIONS 1-9: 13
SUM OF ALL RESPONSES TO PHQ QUESTIONS 1-9: 13

## 2024-02-21 NOTE — PROGRESS NOTES
"  Assessment & Plan     Type 2 diabetes mellitus with polyneuropathy (H)  Hemoglobin A1c up to 14.4 from 12.5.  Voices much frustration regarding this.  Will plan to increase Lantus to 40 units/day.  Increase glipizide to 20 mg/day.  Is agreeable to starting additional medication Farxiga.  Will have to check with insurance regarding coverage.  Educated on use and possible side effects.  Recommend follow-up in 3 months-is not agreeable.  Plan to follow-up in 6 months  - Lipid panel reflex to direct LDL Fasting  - Hemoglobin A1c  - Comprehensive metabolic panel  - CBC with Platelets & Differential  - Albumin Random Urine Quantitative with Creat Ratio  - insulin pen needle (ULTICARE MINI) 31G X 6 MM miscellaneous; USE ONE PEN NEEDLE DAILY OR AS DIRECTED.  - insulin glargine (LANTUS PEN) 100 UNIT/ML pen; Inject 40 Units Subcutaneous at bedtime  - dapagliflozin (FARXIGA) 10 MG TABS tablet; Take 1 tablet (10 mg) by mouth daily  - glipiZIDE (GLUCOTROL XL) 10 MG 24 hr tablet; Take 2 tablets (20 mg) by mouth daily    Hyperlipidemia LDL goal <100  Will recheck lipids here today.  Tolerating simvastatin well.  Refill of simvastatin sent.  Follow-up in 6 months  - simvastatin (ZOCOR) 40 MG tablet; TAKE ONE TABLET BY MOUTH EVERY NIGHT AT BEDTIME    Stage 3 chronic kidney disease, unspecified whether stage 3a or 3b CKD (H)  Continue to avoid over-the-counter NSAIDs.  Push fluids.  Will plan to start on Farxiga-educated on use and possible side effects.  - dapagliflozin (FARXIGA) 10 MG TABS tablet; Take 1 tablet (10 mg) by mouth daily    35 minutes spent by me on the date of the encounter doing chart review, history and exam, documentation and further activities per the note     Prescription drug management      BMI  Estimated body mass index is 29.62 kg/m  as calculated from the following:    Height as of this encounter: 1.905 m (6' 3\").    Weight as of this encounter: 107.5 kg (237 lb).     Depression Screening Follow Up        " 2/21/2024     7:04 AM   PHQ   PHQ-9 Total Score 13   Q9: Thoughts of better off dead/self-harm past 2 weeks Not at all       Follow Up Actions Taken  Patient counseled, no additional follow up at this time.         Scott Rangel is a 68 year old, presenting for the following health issues:  Recheck Medication        2/21/2024     7:17 AM   Additional Questions   Roomed by Chana   Accompanied by Self         2/21/2024     7:17 AM   Patient Reported Additional Medications   Patient reports taking the following new medications na     HPI     Patient with history of diabetes and hyperlipidemia arrived for medication follow-up.    Patient would also like to follow-up with chronic fatigue.    **Labs collected upon arrival.     Diabetes Follow-up    How often are you checking your blood sugar? Not at all  What concerns do you have today about your diabetes? None   Do you have any of these symptoms? (Select all that apply)  Numbness in feet  Have you had a diabetic eye exam in the last 12 months? No        BP Readings from Last 2 Encounters:   02/21/24 (!) 144/90   01/18/24 136/68     Hemoglobin A1C (%)   Date Value   02/21/2024 14.4 (H)   10/03/2022 12.5 (H)   04/01/2021 11.5 (H)   11/06/2019 9.1 (H)     LDL Cholesterol Calculated (mg/dL)   Date Value   12/15/2023 101 (H)   10/03/2022 92   04/01/2021 83   05/09/2019 67             Hyperlipidemia Follow-Up    Are you regularly taking any medication or supplement to lower your cholesterol?   Yes- Simvastatin  Are you having muscle aches or other side effects that you think could be caused by your cholesterol lowering medication?  No        Here today for diabetic check.  Is very frustrated with healthcare system.  Feels like no one can give him a clear answer for ongoing fatigue.  Admits does feel better after metoprolol was discontinued but is not able to be as active as would like to be.  Is not really checking blood sugars at home.  Has been sometime since saw eye  "doctor.  Overall, is just feeling poorly.  Does not think that will live until age 71.  Has previously been on metformin which was discontinued due to increased renal function.  Has been on GLP-1 in the past that was discontinued due to cost.  Continues to take other medications as prescribed.      Objective    BP (!) 144/90 (BP Location: Right arm, Patient Position: Chair, Cuff Size: Adult Large)   Pulse 67   Temp 97.6  F (36.4  C) (Tympanic)   Resp 16   Ht 1.905 m (6' 3\")   Wt 107.5 kg (237 lb)   SpO2 99%   BMI 29.62 kg/m    Body mass index is 29.62 kg/m .  Physical Exam  Constitutional:       Appearance: He is well-developed.   HENT:      Right Ear: Tympanic membrane and external ear normal.      Left Ear: Tympanic membrane and external ear normal.      Mouth/Throat:      Mouth: Mucous membranes are moist.      Pharynx: Uvula midline.   Neck:      Thyroid: No thyromegaly.      Vascular: No carotid bruit.   Cardiovascular:      Rate and Rhythm: Normal rate and regular rhythm.      Heart sounds: Normal heart sounds.   Pulmonary:      Effort: Pulmonary effort is normal.      Breath sounds: Normal breath sounds.   Abdominal:      General: Bowel sounds are normal.      Palpations: Abdomen is soft.   Musculoskeletal:      Right lower leg: No edema.      Left lower leg: No edema.   Skin:     General: Skin is warm and dry.   Neurological:      Mental Status: He is alert.   Psychiatric:         Mood and Affect: Mood normal.                Signed Electronically by: LATHA Machuca CNP    Answers submitted by the patient for this visit:  Patient Health Questionnaire (Submitted on 2/21/2024)  If you checked off any problems, how difficult have these problems made it for you to do your work, take care of things at home, or get along with other people?: Not difficult at all  PHQ9 TOTAL SCORE: 13    "

## 2024-02-21 NOTE — PATIENT INSTRUCTIONS
Please go to the pharmacy to receive your shingles vaccine, Shingrix.  It is a series of 2.  You should receive the first vaccine and then get the second vaccine in at least 2 months.  If more time lapses that is okay.  Do see a reaction similar to tetanus where your arm gets red, stiff sometimes warm to touch.  After the second dose it is very common to feel tired and rundown for 24 hours or so. I RECOMMEND GETTING THIS VACCINE ALL BY ITSELF.     Please go to the pharmacy for your pneumonia vaccine, Prevnar 20.  This vaccine helps protect against 20 types strains of streptococcal pneumonia that can cause serious infections in adults-pneumonia.  After the vaccine it is common to have some injection site redness, warmth and mild swelling.  This should resolve on its own after 24 to 48 hours.  Applying a cool compress to the site can help to ease the discomfort.  Also, you may feel a bit tired and rundown for 28 to 48 hours after receiving the injection.  If you are over age 65 you only need to get this vaccine once.  If you are under age 65 this vaccine should be repeated in 5 years.     Please go to the pharmacy for your RSV (respiratory syncytial virus)vaccine.  You will need 1 dose of the RSV vaccine, 1 time. The RSV vaccine can prevent lower respiratory tract disease caused by respiratory syncytial virus (RSV). RSV is a common respiratory virus that usually causes mild, cold-like symptoms. RSV can cause illness in people of all ages but may be especially serious for infants and older adults. Adults at highest risk for severe RSV disease include older adults, adults with chronic medical conditions such as heart or lung disease, weakened immune systems, or certain other underlying medical conditions, or who live in nursing homes or long-term care facilities     Please go to the pharmacy for your tetanus vaccine.     Increase your Lantus insulin to 40 units daily    Increase your glipizide to 20 mg.  You can take 2  of the tablets that you have available at home.    I sent a new prescription to the pharmacy for Farxiga.  You should take 1 of these tablets daily.  Please let me know if it is unaffordable.

## 2024-03-04 DIAGNOSIS — E11.42 TYPE 2 DIABETES MELLITUS WITH POLYNEUROPATHY (H): ICD-10-CM

## 2024-03-04 RX ORDER — GLIPIZIDE 10 MG/1
10 TABLET, FILM COATED, EXTENDED RELEASE ORAL DAILY
Qty: 90 TABLET | Refills: 0 | OUTPATIENT
Start: 2024-03-04

## 2024-03-14 ENCOUNTER — TRANSFERRED RECORDS (OUTPATIENT)
Dept: MULTI SPECIALTY CLINIC | Facility: CLINIC | Age: 69
End: 2024-03-14

## 2024-03-14 LAB — RETINOPATHY: NORMAL

## 2024-06-07 NOTE — LETTER
[address] [id number] [phone]        April 4, 2019  Juan Carlos Rizo  [address]  [facility] [address]    Dear Juan Carlos,    I care about your health and have reviewed your health plan. I have reviewed your medical conditions, medication list, and lab results and am making recommendations based on this review, to better manage your health.    You are in particular need of attention regarding:  -Diabetes  -Wellness (Physical) Visit . Your last Office Visit was 3/29/18.     I am recommending that you:  -schedule a WELLNESS (Physical) APPOINTMENT with me.   I will check fasting labs the same day - nothing to eat except water and meds for 8-10 hours prior.    Here is a list of Health Maintenance topics that are due now or due soon:  Health Maintenance Due   Topic Date Due     PREVENTIVE CARE VISIT  1955     HIV SCREEN (SYSTEM ASSIGNED)  04/11/1974     COLONOSCOPY Q10 YR  04/11/1976     ZOSTER IMMUNIZATION (1 of 2) 04/11/2006     EYE EXAM Q1 YEAR  03/29/2013     PHQ-2 Q1 YR  12/08/2016     ADVANCE DIRECTIVE PLANNING Q5 YRS  10/04/2017     A1C Q3 MO  06/29/2018     INFLUENZA VACCINE (1) 09/01/2018     FOOT EXAM Q1 YEAR  03/29/2019     BMP Q1 YR  03/29/2019     LIPID MONITORING Q1 YEAR  03/29/2019     MICROALBUMIN Q1 YEAR  03/29/2019           Please call us at [phone] (or use [employer]) to address the above recommendations.     Thank you for trusting [facility] and we appreciate the opportunity to serve you.  We look forward to supporting your healthcare needs in the future.    Healthy RegardsJarett Alicia Helaine, APRN ALLYSSA/Nelda WYNNE RN    
Adult

## 2024-06-22 ENCOUNTER — HEALTH MAINTENANCE LETTER (OUTPATIENT)
Age: 69
End: 2024-06-22

## 2024-07-19 DIAGNOSIS — E11.42 TYPE 2 DIABETES MELLITUS WITH POLYNEUROPATHY (H): ICD-10-CM

## 2024-07-21 RX ORDER — INSULIN GLARGINE 100 [IU]/ML
INJECTION, SOLUTION SUBCUTANEOUS
Qty: 15 ML | Refills: 0 | Status: SHIPPED | OUTPATIENT
Start: 2024-07-21 | End: 2024-07-24

## 2024-07-22 DIAGNOSIS — E11.42 TYPE 2 DIABETES MELLITUS WITH POLYNEUROPATHY (H): ICD-10-CM

## 2024-07-22 RX ORDER — INSULIN GLARGINE 100 [IU]/ML
INJECTION, SOLUTION SUBCUTANEOUS
Qty: 30 ML | Refills: 0 | OUTPATIENT
Start: 2024-07-22

## 2024-07-22 NOTE — TELEPHONE ENCOUNTER
Hello,    Pt is requesting TWO boxes to be sent in and filled. We received a new prescription for *one* box of this medication (15ml) w/ zero refills, but the patient won't accept it because insurance will charge them $70 per fill whether it's for one OR two boxes, and will request two boxes. Please send updated script to reflect the updated qty of 30ml, or reach out to patient if this isn't appropriate, as we have informed him multiple times to reach out to the provider with any issues about qty.    Please let us know if you have any further questions!    Thank you so much for your help!  Pallavi Yin, [facility] Float Department

## 2024-07-24 ENCOUNTER — TELEPHONE (OUTPATIENT)
Dept: FAMILY MEDICINE | Facility: CLINIC | Age: 69
End: 2024-07-24
Payer: COMMERCIAL

## 2024-07-24 DIAGNOSIS — E11.42 TYPE 2 DIABETES MELLITUS WITH POLYNEUROPATHY (H): ICD-10-CM

## 2024-07-24 RX ORDER — INSULIN GLARGINE 100 [IU]/ML
40 INJECTION, SOLUTION SUBCUTANEOUS DAILY
Qty: 30 ML | Refills: 0 | Status: SHIPPED | OUTPATIENT
Start: 2024-07-24 | End: 2024-08-28

## 2024-07-24 RX ORDER — INSULIN GLARGINE 100 [IU]/ML
20 INJECTION, SOLUTION SUBCUTANEOUS AT BEDTIME
Qty: 60 ML | Refills: 0 | Status: SHIPPED | OUTPATIENT
Start: 2024-07-24 | End: 2024-07-24

## 2024-07-24 NOTE — TELEPHONE ENCOUNTER
Good Afternoon,    Patient is requesting a new prescription on lantus with two boxes instead of one. He states he pays the same copay if its one box or two boxes. Please send updated prescription with 2 boxes as quantity per patient requests otherwise reach out to patient.    Thank You,  Willow Mendez, MAURIZIO.Ph.T  [facility] Flo Department

## 2024-07-24 NOTE — TELEPHONE ENCOUNTER
Good Afternoon,    Thank you for sending the updated prescription for the quantity change. However, it seems the dosing may be incorrect as the previous one had 40 units and the new one has two separate dosings on it. Can you verify the dosing?    Thank You,  Willow Mendez, MAURIZIO.Ph.T  [facility] Float Department

## 2024-08-02 ENCOUNTER — TELEPHONE (OUTPATIENT)
Dept: FAMILY MEDICINE | Facility: CLINIC | Age: 69
End: 2024-08-02

## 2024-08-02 NOTE — TELEPHONE ENCOUNTER
Patient Quality Outreach    Patient is due for the following:   Diabetes -  A1C, Eye Exam, Diabetic Follow-Up Visit, and Foot Exam  Hypertension -  BP check and Hypertension follow-up visit  Colon Cancer Screening  Physical Annual Wellness Visit      Topic Date Due    Pneumococcal Vaccine (1 of 2 - PCV) Never done    Zoster (Shingles) Vaccine (1 of 2) Never done    Diptheria Tetanus Pertussis (DTAP/TDAP/TD) Vaccine (2 - Td or Tdap) 03/29/2023    COVID-19 Vaccine (2 - 2023-24 season) 09/01/2023       Type of outreach:    Chart review performed, no outreach needed.  Patient has upcoming visit on 8/28 for other concerns. Added above to note.     Questions for provider review:    None           Kinga Serra MA  Chart routed to Care Team.

## 2024-08-12 DIAGNOSIS — E11.42 TYPE 2 DIABETES MELLITUS WITH POLYNEUROPATHY (H): Primary | ICD-10-CM

## 2024-08-13 DIAGNOSIS — E11.42 TYPE 2 DIABETES MELLITUS WITH POLYNEUROPATHY (H): ICD-10-CM

## 2024-08-13 RX ORDER — GLIPIZIDE 10 MG/1
20 TABLET, FILM COATED, EXTENDED RELEASE ORAL DAILY
Qty: 60 TABLET | Refills: 0 | Status: SHIPPED | OUTPATIENT
Start: 2024-08-13

## 2024-08-28 ENCOUNTER — OFFICE VISIT (OUTPATIENT)
Dept: FAMILY MEDICINE | Facility: CLINIC | Age: 69
End: 2024-08-28
Payer: COMMERCIAL

## 2024-08-28 VITALS
RESPIRATION RATE: 18 BRPM | OXYGEN SATURATION: 98 % | HEIGHT: 75 IN | DIASTOLIC BLOOD PRESSURE: 80 MMHG | BODY MASS INDEX: 30.04 KG/M2 | TEMPERATURE: 97.2 F | WEIGHT: 241.6 LBS | HEART RATE: 74 BPM | SYSTOLIC BLOOD PRESSURE: 132 MMHG

## 2024-08-28 DIAGNOSIS — G60.9 HEREDITARY AND IDIOPATHIC PERIPHERAL NEUROPATHY: ICD-10-CM

## 2024-08-28 DIAGNOSIS — E78.5 HYPERLIPIDEMIA LDL GOAL <100: ICD-10-CM

## 2024-08-28 DIAGNOSIS — E11.42 TYPE 2 DIABETES MELLITUS WITH POLYNEUROPATHY (H): Primary | ICD-10-CM

## 2024-08-28 DIAGNOSIS — N18.31 STAGE 3A CHRONIC KIDNEY DISEASE (H): ICD-10-CM

## 2024-08-28 PROBLEM — N18.32 STAGE 3B CHRONIC KIDNEY DISEASE (H): Status: RESOLVED | Noted: 2024-08-28 | Resolved: 2024-08-28

## 2024-08-28 PROBLEM — N18.32 STAGE 3B CHRONIC KIDNEY DISEASE (H): Status: ACTIVE | Noted: 2024-08-28

## 2024-08-28 LAB
ALBUMIN SERPL BCG-MCNC: 4.1 G/DL (ref 3.5–5.2)
ALP SERPL-CCNC: 73 U/L (ref 40–150)
ALT SERPL W P-5'-P-CCNC: 23 U/L (ref 0–70)
ANION GAP SERPL CALCULATED.3IONS-SCNC: 11 MMOL/L (ref 7–15)
AST SERPL W P-5'-P-CCNC: 21 U/L (ref 0–45)
BILIRUB SERPL-MCNC: 0.3 MG/DL
BUN SERPL-MCNC: 21.8 MG/DL (ref 8–23)
CALCIUM SERPL-MCNC: 9.3 MG/DL (ref 8.8–10.4)
CHLORIDE SERPL-SCNC: 104 MMOL/L (ref 98–107)
CHOLEST SERPL-MCNC: 170 MG/DL
CREAT SERPL-MCNC: 1.44 MG/DL (ref 0.67–1.17)
CREAT UR-MCNC: 113 MG/DL
EGFRCR SERPLBLD CKD-EPI 2021: 53 ML/MIN/1.73M2
FASTING STATUS PATIENT QL REPORTED: NO
FASTING STATUS PATIENT QL REPORTED: NO
GLUCOSE SERPL-MCNC: 247 MG/DL (ref 70–99)
HBA1C MFR BLD: 10.8 % (ref 0–5.6)
HCO3 SERPL-SCNC: 23 MMOL/L (ref 22–29)
HDLC SERPL-MCNC: 42 MG/DL
LDLC SERPL CALC-MCNC: 94 MG/DL
MICROALBUMIN UR-MCNC: 228 MG/L
MICROALBUMIN/CREAT UR: 201.77 MG/G CR (ref 0–17)
NONHDLC SERPL-MCNC: 128 MG/DL
POTASSIUM SERPL-SCNC: 4.6 MMOL/L (ref 3.4–5.3)
PROT SERPL-MCNC: 6.6 G/DL (ref 6.4–8.3)
SODIUM SERPL-SCNC: 138 MMOL/L (ref 135–145)
TRIGL SERPL-MCNC: 172 MG/DL
TSH SERPL DL<=0.005 MIU/L-ACNC: 2.67 UIU/ML (ref 0.3–4.2)

## 2024-08-28 PROCEDURE — 36415 COLL VENOUS BLD VENIPUNCTURE: CPT | Performed by: NURSE PRACTITIONER

## 2024-08-28 PROCEDURE — 82570 ASSAY OF URINE CREATININE: CPT | Performed by: NURSE PRACTITIONER

## 2024-08-28 PROCEDURE — 82043 UR ALBUMIN QUANTITATIVE: CPT | Performed by: NURSE PRACTITIONER

## 2024-08-28 PROCEDURE — 80053 COMPREHEN METABOLIC PANEL: CPT | Performed by: NURSE PRACTITIONER

## 2024-08-28 PROCEDURE — 84443 ASSAY THYROID STIM HORMONE: CPT | Performed by: NURSE PRACTITIONER

## 2024-08-28 PROCEDURE — 99214 OFFICE O/P EST MOD 30 MIN: CPT | Performed by: NURSE PRACTITIONER

## 2024-08-28 PROCEDURE — 80061 LIPID PANEL: CPT | Performed by: NURSE PRACTITIONER

## 2024-08-28 PROCEDURE — 83036 HEMOGLOBIN GLYCOSYLATED A1C: CPT | Performed by: NURSE PRACTITIONER

## 2024-08-28 PROCEDURE — G2211 COMPLEX E/M VISIT ADD ON: HCPCS | Performed by: NURSE PRACTITIONER

## 2024-08-28 RX ORDER — METFORMIN HCL 500 MG
TABLET, EXTENDED RELEASE 24 HR ORAL
Qty: 194 TABLET | Refills: 0 | Status: SHIPPED | OUTPATIENT
Start: 2024-08-28 | End: 2024-12-10

## 2024-08-28 RX ORDER — ROSUVASTATIN CALCIUM 20 MG/1
20 TABLET, COATED ORAL EVERY OTHER DAY
Qty: 90 TABLET | Refills: 0 | Status: SHIPPED | OUTPATIENT
Start: 2024-08-28

## 2024-08-28 NOTE — PROGRESS NOTES
"  Assessment & Plan     Type 2 diabetes mellitus with polyneuropathy (H)  Taking all medications however, does not like having to take insulin.-GLP-1 unaffordable.  Plan to restart metformin.  Continue all other medications.  Follow-up appointment in 3 months.  If renal function is stable at that time consider weaning down insulin and continuing to increase metformin  - Albumin Random Urine Quantitative with Creat Ratio  - Comprehensive metabolic panel  - Hemoglobin A1c  - Lipid panel reflex to direct LDL Fasting  - TSH with free T4 reflex  - metFORMIN (GLUCOPHAGE XR) 500 MG 24 hr tablet; Take 1 tablet (500 mg) by mouth daily (with dinner) for 14 days, THEN 2 tablets (1,000 mg) daily (with dinner).    Stage 3a chronic kidney disease (H)  Will recheck renal function here today.  Continue to drink plenty of fluids.  Continue to avoid over-the-counter NSAIDs.    Hyperlipidemia LDL goal <100  Discontinue simvastatin due to joint aches.  Once joint aches completely resolved plan to start Crestor every other day.  Will plan to recheck lipids and joint aches in 3 months  - rosuvastatin (CRESTOR) 20 MG tablet; Take 1 tablet (20 mg) by mouth every other day.    Hereditary and idiopathic peripheral neuropathy  Enforce importance of foot protection at all times.    The longitudinal plan of care for the diagnosis(es)/condition(s) as documented were addressed during this visit. Due to the added complexity in care, I will continue to support Juan Carlos in the subsequent management and with ongoing continuity of care.     BMI  Estimated body mass index is 30.2 kg/m  as calculated from the following:    Height as of this encounter: 1.905 m (6' 3\").    Weight as of this encounter: 109.6 kg (241 lb 9.6 oz).       Scott Rangel is a 69 year old, presenting for the following health issues:  RECHECK      8/28/2024     7:04 AM   Additional Questions   Roomed by Theresa NICOLE         8/28/2024     7:04 AM   Patient Reported Additional " Medications   Patient reports taking the following new medications None per patient     Via the Health Maintenance questionnaire, the patient has reported the following services have been completed -Eye Exam: na 2024-03-14, this information has been sent to the abstraction team.  History of Present Illness       Diabetes:   He presents for follow up of diabetes.    He is not checking blood glucose.         He has no concerns regarding his diabetes at this time.  He is having numbness in feet and burning in feet.  The patient has had a diabetic eye exam in the last 12 months. Eye exam performed on na. Location of last eye exam na.        He eats 2-3 servings of fruits and vegetables daily.He consumes 0 sweetened beverage(s) daily.He exercises with enough effort to increase his heart rate 60 or more minutes per day.  He exercises with enough effort to increase his heart rate 3 or less days per week.   He is taking medications regularly.       Has questions about simvastatin       Here today for follow-up.  Wonders if can quit taking Zocor.  Quit taking it for a couple of days and aching to hands stopped.  Feels also contributes to a very slight tremor in hands.  Multiple friends have been on statin medications and when they stopped taking them they are aching improved.  Continues to get trigger finger but that has gotten a lot less.    Has had numbness and tingling to feet for years.  Feels unsteady on feet but this is new worse since having COVID.  Has noticed that muscle mass is less than previously was.  Has not fallen.  Is not checking blood sugars at home.  Does not like having to take insulin daily.  Would like to get off of insulin daily injections.  GLP-1 weekly injections not affordable.  Has continue to take glipizide and Farxiga.  Wonders about restarting metformin.  Has had eye exam within the last year, uncertain of date.  Is blind in right eye and has noticed that left eye has blurred/double vision.  Is  "not able to see as well distance wise.  Reports was told by optometrist that could get a prescription eyeglasses if would like but really did not need to get it.        Objective    BP (!) 148/82   Pulse 74   Temp 97.2  F (36.2  C) (Temporal)   Resp 18   Ht 1.905 m (6' 3\")   Wt 109.6 kg (241 lb 9.6 oz)   SpO2 98%   BMI 30.20 kg/m    Body mass index is 30.2 kg/m .  Physical Exam  Constitutional:       Appearance: He is well-developed.   HENT:      Right Ear: Tympanic membrane and external ear normal.      Left Ear: Tympanic membrane and external ear normal.      Mouth/Throat:      Mouth: Mucous membranes are moist.      Pharynx: Uvula midline.   Neck:      Thyroid: No thyromegaly.      Vascular: No carotid bruit.   Cardiovascular:      Rate and Rhythm: Normal rate and regular rhythm.      Heart sounds: Normal heart sounds.   Pulmonary:      Effort: Pulmonary effort is normal.      Breath sounds: Normal breath sounds.   Abdominal:      General: Bowel sounds are normal.      Palpations: Abdomen is soft.   Musculoskeletal:      Right lower leg: No edema.      Left lower leg: No edema.   Skin:     General: Skin is warm and dry.   Neurological:      Mental Status: He is alert.   Psychiatric:         Mood and Affect: Mood normal.              Signed Electronically by: LATHA Machuca CNP    "

## 2024-08-28 NOTE — LETTER
August 28, 2024      Juan Carlos HUFF Sallie  [address] [facility] [address]        Dear ,    We are writing to inform you of your test results.    Here are those results we discussed in clinic.     Resulted Orders   Hemoglobin A1c   Result Value Ref Range    Hemoglobin A1C 10.8 (H) 0.0 - 5.6 %      Comment:      Normal <5.7%   Prediabetes 5.7-6.4%    Diabetes 6.5% or higher     Note: Adopted from ADA consensus guidelines.       If you have any questions or concerns, please call the clinic at the number listed above.       Sincerely,      LATHA Whitlock CNP

## 2024-08-28 NOTE — LETTER
August 29, 2024      Juan Carlos Rizo  [address]  [place] [address]      Juan Carlos,    Continue to spill a significant amount of protein in your urine.  Will plan to continue to monitor this closely in the future.     Resulted Orders   Albumin Random Urine Quantitative with Creat Ratio   Result Value Ref Range    Creatinine Urine mg/dL 113.0 mg/dL      Comment:      The reference ranges have not been established in urine creatinine. The results should be integrated into the clinical context for interpretation.    Albumin Urine mg/L 228.0 mg/L      Comment:      The reference ranges have not been established in urine albumin. The results should be integrated into the clinical context for interpretation.    Albumin Urine mg/g Cr 201.77 (H) 0.00 - 17.00 mg/g Cr      Comment:      Microalbuminuria is defined as an albumin:creatinine ratio of 17 to 299 for males and 25 to 299 for females. A ratio of albumin:creatinine of 300 or higher is indicative of overt proteinuria.  Due to biologic variability, positive results should be confirmed by a second, first-morning random or 24-hour timed urine specimen. If there is discrepancy, a third specimen is recommended. When 2 out of 3 results are in the microalbuminuria range, this is evidence for incipient nephropathy and warrants increased efforts at glucose control, blood pressure control, and institution of therapy with an angiotensin-converting-enzyme (ACE) inhibitor (if the patient can tolerate it).     Comprehensive metabolic panel   Result Value Ref Range    Sodium 138 135 - 145 mmol/L    Potassium 4.6 3.4 - 5.3 mmol/L    Carbon Dioxide (CO2) 23 22 - 29 mmol/L    Anion Gap 11 7 - 15 mmol/L    Urea Nitrogen 21.8 8.0 - 23.0 mg/dL    Creatinine 1.44 (H) 0.67 - 1.17 mg/dL    GFR Estimate 53 (L) >60 mL/min/1.73m2      Comment:      eGFR calculated using 2021 CKD-EPI equation.    Calcium 9.3 8.8 - 10.4 mg/dL      Comment:      Reference intervals for this test were  updated on 7/16/2024 to reflect our healthy population more accurately. There may be differences in the flagging of prior results with similar values performed with this method. Those prior results can be interpreted in the context of the updated reference intervals.    Chloride 104 98 - 107 mmol/L    Glucose 247 (H) 70 - 99 mg/dL    Alkaline Phosphatase 73 40 - 150 U/L    AST 21 0 - 45 U/L    ALT 23 0 - 70 U/L    Protein Total 6.6 6.4 - 8.3 g/dL    Albumin 4.1 3.5 - 5.2 g/dL    Bilirubin Total 0.3 <=1.2 mg/dL    Patient Fasting > 8hrs? No    Hemoglobin A1c   Result Value Ref Range    Hemoglobin A1C 10.8 (H) 0.0 - 5.6 %      Comment:      Normal <5.7%   Prediabetes 5.7-6.4%    Diabetes 6.5% or higher     Note: Adopted from ADA consensus guidelines.   Lipid panel reflex to direct LDL Fasting   Result Value Ref Range    Cholesterol 170 <200 mg/dL    Triglycerides 172 (H) <150 mg/dL    Direct Measure HDL 42 >=40 mg/dL    LDL Cholesterol Calculated 94 <=100 mg/dL    Non HDL Cholesterol 128 <130 mg/dL    Patient Fasting > 8hrs? No     Narrative    Cholesterol  Desirable:  <200 mg/dL    Triglycerides  Normal:  Less than 150 mg/dL  Borderline High:  150-199 mg/dL  High:  200-499 mg/dL  Very High:  Greater than or equal to 500 mg/dL    Direct Measure HDL  Female:  Greater than or equal to 50 mg/dL   Male:  Greater than or equal to 40 mg/dL    LDL Cholesterol  Desirable:  <100mg/dL  Above Desirable:  100-129 mg/dL   Borderline High:  130-159 mg/dL   High:  160-189 mg/dL   Very High:  >= 190 mg/dL    Non HDL Cholesterol  Desirable:  130 mg/dL  Above Desirable:  130-159 mg/dL  Borderline High:  160-189 mg/dL  High:  190-219 mg/dL  Very High:  Greater than or equal to 220 mg/dL   TSH with free T4 reflex   Result Value Ref Range    TSH 2.67 0.30 - 4.20 uIU/mL       If you have any questions or concerns, please call the clinic at the number listed above.       Sincerely,      LATHA Whitlock CNP

## 2024-08-28 NOTE — LETTER
August 29, 2024      Juan Carlos Rizo  [address]  [place] [address]        Juan Carlos,     Triglycerides are a bit elevated however, they have not improved since last check 6 months ago.  This is also likely related to your blood sugars being under better control.  Will plan to recheck these again in 3 months after you stop the simvastatin/Zocor and start the Crestor/rosuvastatin.   Your electrolytes are normal range.   Your kidney function is elevated but is at your baseline.  Will plan to continue to monitor this in the future.   Your blood sugar is elevated as expected.   Your liver function is normal.     Your thyroid function is normal.     Resulted Orders   Comprehensive metabolic panel   Result Value Ref Range    Sodium 138 135 - 145 mmol/L    Potassium 4.6 3.4 - 5.3 mmol/L    Carbon Dioxide (CO2) 23 22 - 29 mmol/L    Anion Gap 11 7 - 15 mmol/L    Urea Nitrogen 21.8 8.0 - 23.0 mg/dL    Creatinine 1.44 (H) 0.67 - 1.17 mg/dL    GFR Estimate 53 (L) >60 mL/min/1.73m2      Comment:      eGFR calculated using 2021 CKD-EPI equation.    Calcium 9.3 8.8 - 10.4 mg/dL      Comment:      Reference intervals for this test were updated on 7/16/2024 to reflect our healthy population more accurately. There may be differences in the flagging of prior results with similar values performed with this method. Those prior results can be interpreted in the context of the updated reference intervals.    Chloride 104 98 - 107 mmol/L    Glucose 247 (H) 70 - 99 mg/dL    Alkaline Phosphatase 73 40 - 150 U/L    AST 21 0 - 45 U/L    ALT 23 0 - 70 U/L    Protein Total 6.6 6.4 - 8.3 g/dL    Albumin 4.1 3.5 - 5.2 g/dL    Bilirubin Total 0.3 <=1.2 mg/dL    Patient Fasting > 8hrs? No    Hemoglobin A1c   Result Value Ref Range    Hemoglobin A1C 10.8 (H) 0.0 - 5.6 %      Comment:      Normal <5.7%   Prediabetes 5.7-6.4%    Diabetes 6.5% or higher     Note: Adopted from ADA consensus guidelines.   Lipid panel reflex to direct LDL  Fasting   Result Value Ref Range    Cholesterol 170 <200 mg/dL    Triglycerides 172 (H) <150 mg/dL    Direct Measure HDL 42 >=40 mg/dL    LDL Cholesterol Calculated 94 <=100 mg/dL    Non HDL Cholesterol 128 <130 mg/dL    Patient Fasting > 8hrs? No     Narrative    Cholesterol  Desirable:  <200 mg/dL    Triglycerides  Normal:  Less than 150 mg/dL  Borderline High:  150-199 mg/dL  High:  200-499 mg/dL  Very High:  Greater than or equal to 500 mg/dL    Direct Measure HDL  Female:  Greater than or equal to 50 mg/dL   Male:  Greater than or equal to 40 mg/dL    LDL Cholesterol  Desirable:  <100mg/dL  Above Desirable:  100-129 mg/dL   Borderline High:  130-159 mg/dL   High:  160-189 mg/dL   Very High:  >= 190 mg/dL    Non HDL Cholesterol  Desirable:  130 mg/dL  Above Desirable:  130-159 mg/dL  Borderline High:  160-189 mg/dL  High:  190-219 mg/dL  Very High:  Greater than or equal to 220 mg/dL   TSH with free T4 reflex   Result Value Ref Range    TSH 2.67 0.30 - 4.20 uIU/mL     Sincerely,    LATHA Whitlock CNP

## 2024-08-28 NOTE — PATIENT INSTRUCTIONS
Please go to the pharmacy for your RSV (respiratory syncytial virus)vaccine.  You will need 1 dose of the RSV vaccine, 1 time. The RSV vaccine can prevent lower respiratory tract disease caused by respiratory syncytial virus (RSV). RSV is a common respiratory virus that usually causes mild, cold-like symptoms. RSV can cause illness in people of all ages but may be especially serious for infants and older adults. Adults at highest risk for severe RSV disease include older adults, adults with chronic medical conditions such as heart or lung disease, weakened immune systems, or certain other underlying medical conditions, or who live in nursing homes or long-term care facilities    Please go to the pharmacy for your pneumonia vaccine, Prevnar 20.  This vaccine helps protect against 20 types strains of streptococcal pneumonia that can cause serious infections in adults-pneumonia.  After the vaccine it is common to have some injection site redness, warmth and mild swelling.  This should resolve on its own after 24 to 48 hours.  Applying a cool compress to the site can help to ease the discomfort.  Also, you may feel a bit tired and rundown for 28 to 48 hours after receiving the injection.  If you are over age 65 you only need to get this vaccine once.  If you are under age 65 this vaccine should be repeated in 5 years.    Please go to the pharmacy to receive your tetanus (Tdap) vaccine.    Please go to the pharmacy to receive your shingles vaccine, Shingrix.  It is a series of 2.  You should receive the first vaccine and then get the second vaccine in at least 2 months.  If more time lapses that is okay.  Do see a reaction similar to tetanus where your arm gets red, stiff sometimes warm to touch.  After the second dose it is very common to feel tired and rundown for 24 hours or so. I RECOMMEND GETTING THIS VACCINE ALL BY ITSELF.     Stop taking the simvastatin. When the pain in your hands stops start taking the crestor  every other day.     Start taking 1 metformin every day will do this for 2 weeks then increase to 2 metformin daily.  Please continue taking all of your other medications including your insulin.    Plan follow-up appointment in 3 months with recheck of labs and to see me

## 2024-09-03 ENCOUNTER — TELEPHONE (OUTPATIENT)
Dept: FAMILY MEDICINE | Facility: CLINIC | Age: 69
End: 2024-09-03
Payer: COMMERCIAL

## 2024-09-03 NOTE — TELEPHONE ENCOUNTER
"Received call from patient and patient's wife. They are calling to follow-up on 8/28 OV. During OV, patient was placed on Metformin and Rosuvastatin. Patient's insulin was discontinued, however, AVS instructions state,     \"Stop taking the simvastatin. When the pain in your hands stops start taking the crestor every other day.      Start taking 1 metformin every day will do this for 2 weeks then increase to 2 metformin daily.  Please continue taking all of your other medications including your insulin.\"    Patient would like to clarify if he is supposed to discontinue taking Insulin as it was discontinued off of medication list, and whether he should still take Glipizide.     Advised per AVS instructions that patient should continue taking all medications except for Simvastatin, and to take Crestor once pain in the hands stops.     Patient would like to verify with a provider that he is able to take all medications together (Metformin, Glipizide, and Insulin Glargine).     Routing to covering provider to please advise as PCP SAG[place]IO.     HUSSAIN Lai RN  [facility]  "

## 2024-09-04 NOTE — TELEPHONE ENCOUNTER
Patient's wife Clari (patient gave consent to speak with) returning call to clinic. Writer relayed provider's message as written, wife and patient state understanding.    Wife states that patient did bring in his BP readings to the clinic at his appointment and wanted to check in to see if PCP has any concerns. Notes that they have checked daily for the last month, patient typically runs around high 120-130s systolically. Notes that today his BP was 136/65 and the highest reading it has been in the last month was 139/74.    Thanks,  VICKI GayN RN  [facility]

## 2024-09-04 NOTE — TELEPHONE ENCOUNTER
Stop insulin.     Restart metformin. Ok to take with glipizide    Stop Zocor, start Crestor once pain in hands improves.     Shira MAKC

## 2024-09-04 NOTE — TELEPHONE ENCOUNTER
Called patient and left a voicemail to return our call to the clinic.       Annie Terry RN on 9/4/2024 at 10:31 AM

## 2024-09-05 NOTE — TELEPHONE ENCOUNTER
Wife, Clari returned call. Provider's message relayed. She had no additional questions or concerns.    Gay Cruz RN  [facility]

## 2024-09-05 NOTE — TELEPHONE ENCOUNTER
Called Clari.  Did they answer the phone: No, left a message on voicemail to return call to the [facility] at [phone], and to ask for any available triage nurse.  (RN did not leave specific details on voicemail for confidential reasons)    Vidhi COHENN RN  Triage Nurse  [facility]

## 2024-10-12 DIAGNOSIS — E11.42 TYPE 2 DIABETES MELLITUS WITH POLYNEUROPATHY (H): ICD-10-CM

## 2024-10-14 RX ORDER — GLIPIZIDE 10 MG/1
20 TABLET, FILM COATED, EXTENDED RELEASE ORAL DAILY
Qty: 180 TABLET | Refills: 0 | Status: SHIPPED | OUTPATIENT
Start: 2024-10-14

## 2024-10-31 PROBLEM — I48.91 ATRIAL FIBRILLATION, UNSPECIFIED TYPE (H): Status: ACTIVE | Noted: 2023-07-12

## 2024-10-31 NOTE — PROGRESS NOTES
Cardiology Clinic Progress Note  Juan Carlos Rizo MRN# [id number]   YOB: 1955 Age: 69 year old      Primary Cardiologist:   Dr. Fish for 9-month follow-up          History of Presenting Illness:      Echocardiogram 2022 at [facility] showed EF 55%, normal RV, no valvular disease     Patient was seen by me in January 2024 and had complaints of chronic dyspnea on exertion intermittently.  He had irregular heart rhythm and I recommended 7-day Zio patch to assess for any further atrial fibrillation.  However, he declined Zio patch.    Most recent lipid profile, BMP, ALT reviewed today.    Was having intermittent SOB this summer, that is now resolved. [place] like he needed to take a deeper breath sometimes.   BP? Controlled   A-fib episodes? None that he is aware of.   No energy or motivation possibly r/t post covid, but seeing PCP in Dec for further work-up.  TSH 8/2024 WNL. [place] this way since COVID in 2022.   Reports not sleeping well has trouble falling asleep and staying asleep, but also takes naps during the day- declines sleep medicine at this time.      Patient reports no chest pain, shortness of breath, PND, orthopnea, presyncope, syncope, edema, heart racing, or palpitations.                    Assessment and Plan:     Plan  Patient Instructions   Medication Changes:  None     Recommendations:  Check blood pressure at least 1 hour after medications. Call the clinic if your blood pressure is consistently greater than 130/80.   Can purchase [employer] EKG if you want to watch for afib at home.     Follow-up:  24 hr holter to assess PVCs   Let us know if you want sleep medicine consult   Having fasting lab in Dec with Primary care, recommend also checking Hemoglobin.   Can consider long [facility] clinic   Cardiology follow up at [facility]: Dr. Fish in 1 year.     Cardiology Scheduling~[phone]  Cardiology Clinic RN~[phone] (Teagan RN, Rani RN; She RN)          Problem List as of  11/1/2024 Reviewed: 8/28/2024 12:25 PM by Shira Denson APRN CNP            Noted       Active Problems    1. Irregular heart beat 11/1/2024     Relevant Orders     Holter Monitor 24 hour Adult Pediatric    2. Other fatigue 11/1/2024     Relevant Orders     Holter Monitor 24 hour Adult Pediatric    3. Benign essential hypertension 6/2/2008     Overview Deleted 10/31/2024  4:10 PM by Jeana Gamboa APRN CNP              Last Assessment & Plan 11/1/2024 Office Visit Written 10/31/2024  4:10 PM by Jeana Gamboa APRN CNP      Controlled  Continue current medications          Relevant Orders     Follow-Up with Cardiology    4. Atrial fibrillation, unspecified type (H) - Primary 7/12/2023     Overview Signed 10/31/2024  4:10 PM by Jeana Gamboa APRN CNP      Paroxysmal  Onset 1/2022 in context of COVID  No A. fib on follow-up monitor 10/2022  Beta-blocker discontinued due to second-degree AV block  Anticoagulation discontinued due to no recurrence  Declined follow-up monitor 2023  [id number] score 2          Last Assessment & Plan 11/1/2024 Office Visit Written 10/31/2024  4:10 PM by Jeana Gamboa APRN CNP      AV fawn blocker discontinued due to second-degree AV block          Relevant Orders     Follow-Up with Cardiology             Respiratory:  clear to auscultation; normal symmetry        Cardiac: regular rate and rhythm irregular     GI:  abdomen nondistended     Extremities and Muscular Skeletal:   no edema          Total time spent today was 40 mins, reviewing labs, testing, notes, documenting notes, and seeing patient.        Thank you for allowing me to participate in this delightful patient's care.   This note was completed in part using Dragon voice recognition software. Although reviewed after completion, some word and grammatical errors may occur.    LATHA Barragan CNP

## 2024-10-31 NOTE — PATIENT INSTRUCTIONS
Medication Changes:  None     Recommendations:  Check blood pressure at least 1 hour after medications. Call the clinic if your blood pressure is consistently greater than 130/80.   Can purchase [employer] EKG if you want to watch for afib at home.     Follow-up:  24 hr holter to assess PVCs   Let us know if you want sleep medicine consult   Having fasting lab in Dec with Primary care, recommend also checking Hemoglobin.   Can consider long [facility]   Cardiology follow up at [facility]: Dr. Fish in 1 year.     Cardiology Scheduling~[phone]  Cardiology Clinic RN~[phone] (Teagan RN, Rani RN; She RN)

## 2024-11-01 ENCOUNTER — OFFICE VISIT (OUTPATIENT)
Dept: CARDIOLOGY | Facility: CLINIC | Age: 69
End: 2024-11-01
Payer: COMMERCIAL

## 2024-11-01 ENCOUNTER — HOSPITAL ENCOUNTER (OUTPATIENT)
Dept: CARDIOLOGY | Facility: CLINIC | Age: 69
Discharge: HOME | End: 2024-11-01
Attending: NURSE PRACTITIONER | Admitting: NURSE PRACTITIONER
Payer: COMMERCIAL

## 2024-11-01 VITALS
RESPIRATION RATE: 18 BRPM | HEART RATE: 71 BPM | HEIGHT: 75 IN | SYSTOLIC BLOOD PRESSURE: 113 MMHG | OXYGEN SATURATION: 98 % | WEIGHT: 229.7 LBS | BODY MASS INDEX: 28.56 KG/M2 | DIASTOLIC BLOOD PRESSURE: 73 MMHG

## 2024-11-01 DIAGNOSIS — I10 BENIGN ESSENTIAL HYPERTENSION: ICD-10-CM

## 2024-11-01 DIAGNOSIS — I48.91 ATRIAL FIBRILLATION, UNSPECIFIED TYPE (H): Primary | ICD-10-CM

## 2024-11-01 DIAGNOSIS — R53.83 OTHER FATIGUE: ICD-10-CM

## 2024-11-01 DIAGNOSIS — I49.9 IRREGULAR HEART BEAT: ICD-10-CM

## 2024-11-01 PROCEDURE — 93225 XTRNL ECG REC<48 HRS REC: CPT

## 2024-11-01 PROCEDURE — 99215 OFFICE O/P EST HI 40 MIN: CPT | Performed by: NURSE PRACTITIONER

## 2024-11-01 ASSESSMENT — PAIN SCALES - GENERAL: PAINLEVEL_OUTOF10: NO PAIN (0)

## 2024-11-01 NOTE — LETTER
11/1/2024    Shira Denson, APRN CNP  [address] [facility]  [place] [address]    RE: Juan Carlos Rizo       Dear Colleague,     I had the pleasure of seeing Juan Carlos Rizo in the [facility].  Cardiology Clinic Progress Note  Juan Carlos Rizo MRN# [id number]   YOB: 1955 Age: 69 year old      Primary Cardiologist:   Dr. Fish for 9-month follow-up          History of Presenting Illness:      Echocardiogram 2022 at [facility] showed EF 55%, normal RV, no valvular disease     Patient was seen by me in January 2024 and had complaints of chronic dyspnea on exertion intermittently.  He had irregular heart rhythm and I recommended 7-day Zio patch to assess for any further atrial fibrillation.  However, he declined Zio patch.    Most recent lipid profile, BMP, ALT reviewed today.    Was having intermittent SOB this summer, that is now resolved. [place] like he needed to take a deeper breath sometimes.   BP? Controlled   A-fib episodes? None that he is aware of.   No energy or motivation possibly r/t post covid, but seeing PCP in Dec for further work-up.  TSH 8/2024 WNL. [place] this way since COVID in 2022.   Reports not sleeping well has trouble falling asleep and staying asleep, but also takes naps during the day- declines sleep medicine at this time.      Patient reports no chest pain, shortness of breath, PND, orthopnea, presyncope, syncope, edema, heart racing, or palpitations.                    Assessment and Plan:     Plan  Patient Instructions   Medication Changes:  None     Recommendations:  Check blood pressure at least 1 hour after medications. Call the clinic if your blood pressure is consistently greater than 130/80.   Can purchase vIPtela EKG if you want to watch for afib at home.     Follow-up:  24 hr holter to assess PVCs   Let us know if you want sleep medicine consult   Having fasting lab in Dec with Primary care, recommend also checking Hemoglobin.   [facility]   Cardiology follow up at [facility]: Dr. Fish in 1 year.     Cardiology Scheduling~[phone]  Cardiology Clinic RN~[phone] (Teagan RN, Rani RN; She RN)          Problem List as of 11/1/2024 Reviewed: 8/28/2024 12:25 PM by Shira Denson APRN CNP            Noted       Active Problems    1. Irregular heart beat 11/1/2024     Relevant Orders     Holter Monitor 24 hour Adult Pediatric    2. Other fatigue 11/1/2024     Relevant Orders     Holter Monitor 24 hour Adult Pediatric    3. Benign essential hypertension 6/2/2008     Overview Deleted 10/31/2024  4:10 PM by Jeana Gamboa APRN CNP              Last Assessment & Plan 11/1/2024 Office Visit Written 10/31/2024  4:10 PM by Jeana Gamboa APRN CNP      Controlled  Continue current medications          Relevant Orders     Follow-Up with Cardiology    4. Atrial fibrillation, unspecified type (H) - Primary 7/12/2023     Overview Signed 10/31/2024  4:10 PM by Jeana Gamboa APRN CNP      Paroxysmal  Onset 1/2022 in context of COVID  No A. fib on follow-up monitor 10/2022  Beta-blocker discontinued due to second-degree AV block  Anticoagulation discontinued due to no recurrence  Declined follow-up monitor 2023  [id number] score 2          Last Assessment & Plan 11/1/2024 Office Visit Written 10/31/2024  4:10 PM by Jeana Gamboa APRN CNP      AV fawn blocker discontinued due to second-degree AV block          Relevant Orders     Follow-Up with Cardiology             Respiratory:  clear to auscultation; normal symmetry        Cardiac: regular rate and rhythm irregular     GI:  abdomen nondistended     Extremities and Muscular Skeletal:   no edema          Total time spent today was 40 mins, reviewing labs, testing, notes, documenting notes, and seeing patient.        Thank you for allowing me to participate in this delightful patient's care.   This note was completed in part using  [employer] voice recognition software. Although reviewed after completion, some word and grammatical errors may occur.    LATHA Barragan CNP                  Thank you for allowing me to participate in the care of your patient.      Sincerely,     LATHA Barragan CNP     [facility]  cc:   LATHA Hendrix CNP  [address] [place] [address]

## 2024-11-04 PROCEDURE — 93227 XTRNL ECG REC<48 HR R&I: CPT | Performed by: INTERNAL MEDICINE

## 2024-11-10 DIAGNOSIS — E11.42 TYPE 2 DIABETES MELLITUS WITH POLYNEUROPATHY (H): Primary | ICD-10-CM

## 2024-11-15 DIAGNOSIS — E11.42 TYPE 2 DIABETES MELLITUS WITH POLYNEUROPATHY (H): ICD-10-CM

## 2024-11-15 RX ORDER — METFORMIN HYDROCHLORIDE 500 MG/1
1000 TABLET, EXTENDED RELEASE ORAL
Qty: 60 TABLET | Refills: 0 | Status: SHIPPED | OUTPATIENT
Start: 2024-11-15

## 2024-12-02 ENCOUNTER — OFFICE VISIT (OUTPATIENT)
Dept: FAMILY MEDICINE | Facility: CLINIC | Age: 69
End: 2024-12-02
Payer: COMMERCIAL

## 2024-12-02 VITALS
WEIGHT: 223.2 LBS | OXYGEN SATURATION: 99 % | SYSTOLIC BLOOD PRESSURE: 134 MMHG | HEIGHT: 75 IN | TEMPERATURE: 97.4 F | RESPIRATION RATE: 20 BRPM | BODY MASS INDEX: 27.75 KG/M2 | DIASTOLIC BLOOD PRESSURE: 80 MMHG | HEART RATE: 85 BPM

## 2024-12-02 DIAGNOSIS — N18.30 STAGE 3 CHRONIC KIDNEY DISEASE, UNSPECIFIED WHETHER STAGE 3A OR 3B CKD (H): ICD-10-CM

## 2024-12-02 DIAGNOSIS — E78.5 HYPERLIPIDEMIA LDL GOAL <100: ICD-10-CM

## 2024-12-02 DIAGNOSIS — E11.42 TYPE 2 DIABETES MELLITUS WITH POLYNEUROPATHY (H): Primary | ICD-10-CM

## 2024-12-02 DIAGNOSIS — E11.42 TYPE 2 DIABETES MELLITUS WITH POLYNEUROPATHY (H): ICD-10-CM

## 2024-12-02 DIAGNOSIS — E11.42 DIABETIC POLYNEUROPATHY ASSOCIATED WITH TYPE 2 DIABETES MELLITUS (H): ICD-10-CM

## 2024-12-02 DIAGNOSIS — R42 DIZZINESS: ICD-10-CM

## 2024-12-02 LAB
ANION GAP SERPL CALCULATED.3IONS-SCNC: 10 MMOL/L (ref 7–15)
BASOPHILS # BLD AUTO: 0.1 10E3/UL (ref 0–0.2)
BASOPHILS NFR BLD AUTO: 1 %
BUN SERPL-MCNC: 17 MG/DL (ref 8–23)
CALCIUM SERPL-MCNC: 9.5 MG/DL (ref 8.8–10.4)
CHLORIDE SERPL-SCNC: 98 MMOL/L (ref 98–107)
CREAT SERPL-MCNC: 1.37 MG/DL (ref 0.67–1.17)
CREAT UR-MCNC: 198 MG/DL
EGFRCR SERPLBLD CKD-EPI 2021: 56 ML/MIN/1.73M2
EOSINOPHIL # BLD AUTO: 0.3 10E3/UL (ref 0–0.7)
EOSINOPHIL NFR BLD AUTO: 5 %
ERYTHROCYTE [DISTWIDTH] IN BLOOD BY AUTOMATED COUNT: 12.6 % (ref 10–15)
EST. AVERAGE GLUCOSE BLD GHB EST-MCNC: 324 MG/DL
GLUCOSE SERPL-MCNC: 394 MG/DL (ref 70–99)
HBA1C MFR BLD: 12.9 % (ref 0–5.6)
HCO3 SERPL-SCNC: 26 MMOL/L (ref 22–29)
HCT VFR BLD AUTO: 44.5 % (ref 40–53)
HGB BLD-MCNC: 15.1 G/DL (ref 13.3–17.7)
IMM GRANULOCYTES # BLD: 0 10E3/UL
IMM GRANULOCYTES NFR BLD: 0 %
IRON BINDING CAPACITY (ROCHE): 281 UG/DL (ref 240–430)
IRON SATN MFR SERPL: 31 % (ref 15–46)
IRON SERPL-MCNC: 86 UG/DL (ref 61–157)
LYMPHOCYTES # BLD AUTO: 1.4 10E3/UL (ref 0.8–5.3)
LYMPHOCYTES NFR BLD AUTO: 20 %
MCH RBC QN AUTO: 28.1 PG (ref 26.5–33)
MCHC RBC AUTO-ENTMCNC: 33.9 G/DL (ref 31.5–36.5)
MCV RBC AUTO: 83 FL (ref 78–100)
MICROALBUMIN UR-MCNC: 429 MG/L
MICROALBUMIN/CREAT UR: 216.67 MG/G CR (ref 0–17)
MONOCYTES # BLD AUTO: 0.7 10E3/UL (ref 0–1.3)
MONOCYTES NFR BLD AUTO: 9 %
NEUTROPHILS # BLD AUTO: 4.6 10E3/UL (ref 1.6–8.3)
NEUTROPHILS NFR BLD AUTO: 65 %
PLATELET # BLD AUTO: 174 10E3/UL (ref 150–450)
POTASSIUM SERPL-SCNC: 4.6 MMOL/L (ref 3.4–5.3)
RBC # BLD AUTO: 5.37 10E6/UL (ref 4.4–5.9)
SODIUM SERPL-SCNC: 134 MMOL/L (ref 135–145)
TSH SERPL DL<=0.005 MIU/L-ACNC: 2.32 UIU/ML (ref 0.3–4.2)
WBC # BLD AUTO: 7.1 10E3/UL (ref 4–11)

## 2024-12-02 PROCEDURE — 80048 BASIC METABOLIC PNL TOTAL CA: CPT | Performed by: NURSE PRACTITIONER

## 2024-12-02 PROCEDURE — 36415 COLL VENOUS BLD VENIPUNCTURE: CPT | Performed by: NURSE PRACTITIONER

## 2024-12-02 PROCEDURE — 82570 ASSAY OF URINE CREATININE: CPT | Performed by: NURSE PRACTITIONER

## 2024-12-02 PROCEDURE — 83036 HEMOGLOBIN GLYCOSYLATED A1C: CPT | Performed by: NURSE PRACTITIONER

## 2024-12-02 PROCEDURE — 82043 UR ALBUMIN QUANTITATIVE: CPT | Performed by: NURSE PRACTITIONER

## 2024-12-02 PROCEDURE — 99214 OFFICE O/P EST MOD 30 MIN: CPT | Performed by: NURSE PRACTITIONER

## 2024-12-02 PROCEDURE — 83550 IRON BINDING TEST: CPT | Performed by: NURSE PRACTITIONER

## 2024-12-02 PROCEDURE — G2211 COMPLEX E/M VISIT ADD ON: HCPCS | Performed by: NURSE PRACTITIONER

## 2024-12-02 PROCEDURE — 83540 ASSAY OF IRON: CPT | Performed by: NURSE PRACTITIONER

## 2024-12-02 PROCEDURE — 84443 ASSAY THYROID STIM HORMONE: CPT | Performed by: NURSE PRACTITIONER

## 2024-12-02 PROCEDURE — 85025 COMPLETE CBC W/AUTO DIFF WBC: CPT | Performed by: NURSE PRACTITIONER

## 2024-12-02 RX ORDER — ROSUVASTATIN CALCIUM 20 MG/1
20 TABLET, COATED ORAL EVERY OTHER DAY
Qty: 90 TABLET | Refills: 3 | Status: SHIPPED | OUTPATIENT
Start: 2024-12-02

## 2024-12-02 RX ORDER — METFORMIN HYDROCHLORIDE 500 MG/1
1000 TABLET, EXTENDED RELEASE ORAL 2 TIMES DAILY WITH MEALS
Qty: 360 TABLET | Refills: 1 | Status: SHIPPED | OUTPATIENT
Start: 2024-12-02

## 2024-12-02 RX ORDER — GABAPENTIN 100 MG/1
100 CAPSULE ORAL AT BEDTIME
Qty: 90 CAPSULE | Refills: 0 | Status: SHIPPED | OUTPATIENT
Start: 2024-12-02

## 2024-12-02 RX ORDER — DAPAGLIFLOZIN 10 MG/1
10 TABLET, FILM COATED ORAL DAILY
Qty: 90 TABLET | Refills: 0 | Status: SHIPPED | OUTPATIENT
Start: 2024-12-02

## 2024-12-02 RX ORDER — GLIPIZIDE 10 MG/1
20 TABLET, FILM COATED, EXTENDED RELEASE ORAL DAILY
Qty: 180 TABLET | Refills: 0 | Status: SHIPPED | OUTPATIENT
Start: 2024-12-02

## 2024-12-02 RX ORDER — METFORMIN HYDROCHLORIDE 500 MG/1
1000 TABLET, EXTENDED RELEASE ORAL
Qty: 180 TABLET | Refills: 0 | Status: SHIPPED | OUTPATIENT
Start: 2024-12-02 | End: 2024-12-02

## 2024-12-02 NOTE — PROGRESS NOTES
.  Assessment & Plan     Type 2 diabetes mellitus with polyneuropathy (H)  A1c elevated.  Will check renal function here today.  If renal function continues to be stable we will plan to increase metformin further.  Declines wanting to restart insulin or prescription for GLP-1.  Patient uncertain if is actually taking Farxiga as wife sets up medications.  - Albumin Random Urine Quantitative with Creat Ratio  - Basic metabolic panel  - Hemoglobin A1c  - TSH with free T4 reflex  - Iron & Iron Binding Capacity; Future  - dapagliflozin (FARXIGA) 10 MG TABS tablet; Take 1 tablet (10 mg) by mouth daily.  - glipiZIDE (GLUCOTROL XL) 10 MG 24 hr tablet; Take 2 tablets (20 mg) by mouth daily.  - metFORMIN (GLUCOPHAGE XR) 500 MG 24 hr tablet; Take 2 tablets (1,000 mg) by mouth daily (with dinner).  - Iron & Iron Binding Capacity    Stage 3 chronic kidney disease, unspecified whether stage 3a or 3b CKD (H)  Will recheck labs here today.  Continue to avoid NSAIDs.  Continue to drink plenty of fluids.  - dapagliflozin (FARXIGA) 10 MG TABS tablet; Take 1 tablet (10 mg) by mouth daily.    Hyperlipidemia LDL goal <100  Most recent lipids reviewed.  Tolerating Crestor/rosuvastatin well.  Refill sent.  - rosuvastatin (CRESTOR) 20 MG tablet; Take 1 tablet (20 mg) by mouth every other day.    Diabetic polyneuropathy associated with type 2 diabetes mellitus (H)  Education given.  Will plan to start on gabapentin.  Educated on use and possible side effects.  Will plan virtual appointment in 1 month for recheck.  Anticipate need to gradually increase medication.  - Iron & Iron Binding Capacity; Future  - gabapentin (NEURONTIN) 100 MG capsule; Take 1 capsule (100 mg) by mouth at bedtime.  - Iron & Iron Binding Capacity    Dizziness  Recommend advanced imaging of the brain.  Declines at this time.  Reinforced importance of water intake.  - CBC with Platelets & Differential; Future  - CBC with Platelets & Differential    Feels like all current  "concerns are due to having COVID-19.  That is unlikely to be due to to uncontrolled diabetes.  Much education given.    The longitudinal plan of care for the diagnosis(es)/condition(s) as documented were addressed during this visit. Due to the added complexity in care, I will continue to support Juan Carlos in the subsequent management and with ongoing continuity of care.     BMI  Estimated body mass index is 27.9 kg/m  as calculated from the following:    Height as of this encounter: 1.905 m (6' 3\").    Weight as of this encounter: 101.2 kg (223 lb 3.2 oz).       Scott Rangel is a 69 year old, presenting for the following health issues:  Diabetes        12/2/2024     6:55 AM   Additional Questions   Roomed by Theresa NICOLE         12/2/2024     6:55 AM   Patient Reported Additional Medications   Patient reports taking the following new medications None per patient     History of Present Illness       Diabetes:   He presents for follow up of diabetes.    He is not checking blood glucose.         He has no concerns regarding his diabetes at this time.  He is having numbness in feet.            He eats 2-3 servings of fruits and vegetables daily.He consumes 0 sweetened beverage(s) daily.He exercises with enough effort to increase his heart rate 20 to 29 minutes per day.  He exercises with enough effort to increase his heart rate 3 or less days per week.   He is taking medications regularly.       Here today for diabetic check.  Is very frustrated.  Feet hurt very badly.  Is not able to walk more than about 100 feet without significant pain.  Wondering about getting handicap parking permit.    Will occasionally have dizziness.  Occurs more when looks up or reaches up overhead and looks up.  Has been able to move arms, legs, feet, walk, talk, eat and drink without any problems.  Reports the dizziness started after was in the hospital due to COVID-19.  Feels tired all of the time.          Objective    /80   Pulse " "85   Temp 97.4  F (36.3  C) (Temporal)   Resp 20   Ht 1.905 m (6' 3\")   Wt 101.2 kg (223 lb 3.2 oz)   SpO2 99%   BMI 27.90 kg/m    Body mass index is 27.9 kg/m .  Physical Exam  Constitutional:       Appearance: He is well-developed.   HENT:      Right Ear: Tympanic membrane and external ear normal.      Left Ear: Tympanic membrane and external ear normal.      Mouth/Throat:      Mouth: Mucous membranes are moist.      Pharynx: Uvula midline.   Neck:      Thyroid: No thyromegaly.      Vascular: No carotid bruit.   Cardiovascular:      Rate and Rhythm: Normal rate and regular rhythm.      Heart sounds: Normal heart sounds.   Pulmonary:      Effort: Pulmonary effort is normal.      Breath sounds: Normal breath sounds.   Abdominal:      General: Bowel sounds are normal.      Palpations: Abdomen is soft.   Musculoskeletal:      Right lower leg: No edema.      Left lower leg: No edema.   Skin:     General: Skin is warm and dry.   Neurological:      Mental Status: He is alert.   Psychiatric:         Mood and Affect: Mood normal.              Signed Electronically by: LATHA Machuca CNP    "

## 2024-12-02 NOTE — LETTER
December 2, 2024      Juan Carlos Rizo  [address]  [place] [facility] [address]        Dear ,    We are writing to inform you of your test results.    Your blood counts are in normal range.  Your blood does not show signs of thickness on this lab.  I would recommend doing the imaging of your head.     Resulted Orders   Hemoglobin A1c   Result Value Ref Range    Estimated Average Glucose 324 (H) <117 mg/dL    Hemoglobin A1C 12.9 (H) 0.0 - 5.6 %      Comment:      Normal <5.7%   Prediabetes 5.7-6.4%    Diabetes 6.5% or higher     Note: Adopted from ADA consensus guidelines.   CBC with platelets and differential   Result Value Ref Range    WBC Count 7.1 4.0 - 11.0 10e3/uL    RBC Count 5.37 4.40 - 5.90 10e6/uL    Hemoglobin 15.1 13.3 - 17.7 g/dL    Hematocrit 44.5 40.0 - 53.0 %    MCV 83 78 - 100 fL    MCH 28.1 26.5 - 33.0 pg    MCHC 33.9 31.5 - 36.5 g/dL    RDW 12.6 10.0 - 15.0 %    Platelet Count 174 150 - 450 10e3/uL    % Neutrophils 65 %    % Lymphocytes 20 %    % Monocytes 9 %    % Eosinophils 5 %    % Basophils 1 %    % Immature Granulocytes 0 %    Absolute Neutrophils 4.6 1.6 - 8.3 10e3/uL    Absolute Lymphocytes 1.4 0.8 - 5.3 10e3/uL    Absolute Monocytes 0.7 0.0 - 1.3 10e3/uL    Absolute Eosinophils 0.3 0.0 - 0.7 10e3/uL    Absolute Basophils 0.1 0.0 - 0.2 10e3/uL    Absolute Immature Granulocytes 0.0 <=0.4 10e3/uL       If you have any questions or concerns, please call the clinic at the number listed above.       Sincerely,      LATHA Whitlock CNP

## 2024-12-02 NOTE — LETTER
December 3, 2024      Juan Carlos Rizo  [address]  [facility] [address]        Dear ,    We are writing to inform you of your test results.    You are spilling a bit more protein into your urine-this is likely due to to your blood sugars being more elevated.  Please make sure you are drinking plenty of water daily and avoid over-the-counter ibuprofen, Advil, Aleve and naproxen.     Your kidney function is stable since last check 3 months ago.  Increase your dose of metformin as discussed in clinic.  I have sent an updated prescription to the pharmacy for metformin, 2 tablets (1000 mg) twice per day.      Your iron level is in normal range.     Your thyroid function is normal.     Please let me know if you would like to have the imaging done of your brain.       Resulted Orders   Albumin Random Urine Quantitative with Creat Ratio   Result Value Ref Range    Creatinine Urine mg/dL 198.0 mg/dL      Comment:      The reference ranges have not been established in urine creatinine. The results should be integrated into the clinical context for interpretation.    Albumin Urine mg/L 429.0 mg/L      Comment:      The reference ranges have not been established in urine albumin. The results should be integrated into the clinical context for interpretation.    Albumin Urine mg/g Cr 216.67 (H) 0.00 - 17.00 mg/g Cr      Comment:      Microalbuminuria is defined as an albumin:creatinine ratio of 17 to 299 for males and 25 to 299 for females. A ratio of albumin:creatinine of 300 or higher is indicative of overt proteinuria.  Due to biologic variability, positive results should be confirmed by a second, first-morning random or 24-hour timed urine specimen. If there is discrepancy, a third specimen is recommended. When 2 out of 3 results are in the microalbuminuria range, this is evidence for incipient nephropathy and warrants increased efforts at glucose control, blood pressure control, and institution of  therapy with an angiotensin-converting-enzyme (ACE) inhibitor (if the patient can tolerate it).     Basic metabolic panel   Result Value Ref Range    Sodium 134 (L) 135 - 145 mmol/L    Potassium 4.6 3.4 - 5.3 mmol/L    Chloride 98 98 - 107 mmol/L    Carbon Dioxide (CO2) 26 22 - 29 mmol/L    Anion Gap 10 7 - 15 mmol/L    Urea Nitrogen 17.0 8.0 - 23.0 mg/dL    Creatinine 1.37 (H) 0.67 - 1.17 mg/dL    GFR Estimate 56 (L) >60 mL/min/1.73m2      Comment:      eGFR calculated using 2021 CKD-EPI equation.    Calcium 9.5 8.8 - 10.4 mg/dL      Comment:      Reference intervals for this test were updated on 7/16/2024 to reflect our healthy population more accurately. There may be differences in the flagging of prior results with similar values performed with this method. Those prior results can be interpreted in the context of the updated reference intervals.    Glucose 394 (H) 70 - 99 mg/dL   Hemoglobin A1c   Result Value Ref Range    Estimated Average Glucose 324 (H) <117 mg/dL    Hemoglobin A1C 12.9 (H) 0.0 - 5.6 %      Comment:      Normal <5.7%   Prediabetes 5.7-6.4%    Diabetes 6.5% or higher     Note: Adopted from ADA consensus guidelines.   TSH with free T4 reflex   Result Value Ref Range    TSH 2.32 0.30 - 4.20 uIU/mL   CBC with platelets and differential   Result Value Ref Range    WBC Count 7.1 4.0 - 11.0 10e3/uL    RBC Count 5.37 4.40 - 5.90 10e6/uL    Hemoglobin 15.1 13.3 - 17.7 g/dL    Hematocrit 44.5 40.0 - 53.0 %    MCV 83 78 - 100 fL    MCH 28.1 26.5 - 33.0 pg    MCHC 33.9 31.5 - 36.5 g/dL    RDW 12.6 10.0 - 15.0 %    Platelet Count 174 150 - 450 10e3/uL    % Neutrophils 65 %    % Lymphocytes 20 %    % Monocytes 9 %    % Eosinophils 5 %    % Basophils 1 %    % Immature Granulocytes 0 %    Absolute Neutrophils 4.6 1.6 - 8.3 10e3/uL    Absolute Lymphocytes 1.4 0.8 - 5.3 10e3/uL    Absolute Monocytes 0.7 0.0 - 1.3 10e3/uL    Absolute Eosinophils 0.3 0.0 - 0.7 10e3/uL    Absolute Basophils 0.1 0.0 - 0.2 10e3/uL     Absolute Immature Granulocytes 0.0 <=0.4 10e3/uL   Iron & Iron Binding Capacity   Result Value Ref Range    Iron 86 61 - 157 ug/dL    Iron Binding Capacity 281 240 - 430 ug/dL    Iron Sat Index 31 15 - 46 %       If you have any questions or concerns, please call the clinic at the number listed above.       Sincerely,      LATHA Whitlock CNP

## 2024-12-02 NOTE — PATIENT INSTRUCTIONS
Please go to the pharmacy to receive your shingles vaccine, Shingrix.  It is a series of 2.  You should receive the first vaccine and then get the second vaccine in at least 2 months.  If more time lapses that is okay.  Do see a reaction similar to tetanus where your arm gets red, stiff sometimes warm to touch.  After the second dose it is very common to feel tired and rundown for 24 hours or so. I RECOMMEND GETTING THIS VACCINE ALL BY ITSELF.      Please go to the pharmacy for your pneumonia vaccine, Prevnar 20.  This vaccine helps protect against 20 types strains of streptococcal pneumonia that can cause serious infections in adults-pneumonia.  After the vaccine it is common to have some injection site redness, warmth and mild swelling.  This should resolve on its own after 24 to 48 hours.  Applying a cool compress to the site can help to ease the discomfort.  Also, you may feel a bit tired and rundown for 28 to 48 hours after receiving the injection.  If you are over age 65 you only need to get this vaccine once.  If you are under age 65 this vaccine should be repeated in 5 years.     Please go to the pharmacy for your RSV (respiratory syncytial virus)vaccine.  You will need 1 dose of the RSV vaccine, 1 time. The RSV vaccine can prevent lower respiratory tract disease caused by respiratory syncytial virus (RSV). RSV is a common respiratory virus that usually causes mild, cold-like symptoms. RSV can cause illness in people of all ages but may be especially serious for infants and older adults. Adults at highest risk for severe RSV disease include older adults, adults with chronic medical conditions such as heart or lung disease, weakened immune systems, or certain other underlying medical conditions, or who live in nursing homes or long-term care facilities    Please go to the pharmacy to receive your tetanus (Tdap) vaccine.

## 2025-01-06 ENCOUNTER — VIRTUAL VISIT (OUTPATIENT)
Dept: FAMILY MEDICINE | Facility: CLINIC | Age: 70
End: 2025-01-06
Payer: COMMERCIAL

## 2025-01-06 DIAGNOSIS — N18.31 STAGE 3A CHRONIC KIDNEY DISEASE (H): ICD-10-CM

## 2025-01-06 DIAGNOSIS — E11.42 DIABETIC POLYNEUROPATHY ASSOCIATED WITH TYPE 2 DIABETES MELLITUS (H): ICD-10-CM

## 2025-01-06 PROBLEM — I48.91 ATRIAL FIBRILLATION, UNSPECIFIED TYPE (H): Status: RESOLVED | Noted: 2023-07-12 | Resolved: 2025-01-06

## 2025-01-06 PROCEDURE — 98013 SYNCH AUDIO-ONLY EST LOW 20: CPT | Performed by: NURSE PRACTITIONER

## 2025-01-06 RX ORDER — GABAPENTIN 100 MG/1
100 CAPSULE ORAL 2 TIMES DAILY
Qty: 180 CAPSULE | Refills: 0 | Status: SHIPPED | OUTPATIENT
Start: 2025-01-06

## 2025-01-06 NOTE — PROGRESS NOTES
"Juan Carlos is a 69 year old who is being evaluated via a billable telephone visit.    What phone number would you like to be contacted at? [phone]   How would you like to obtain your AVS? Mail a copy  Originating Location (pt. Location): Home    Distant Location (provider location):  On-site  Telephone visit completed due to the patient did not consent to a video visit.    Assessment & Plan     Stage 3a chronic kidney disease (H)  Most recent labs reviewed.  Was unable to afford Farxiga-cost over $600 per month.  Per patient insurance plan does not cover any other SGLT2 inhibitors.    Diabetic polyneuropathy associated with type 2 diabetes mellitus (H)  Doing well with 100 mg nightly.  Increase gabapentin to 100 mg twice daily.  If notices increased fatigue during the day may change dose to 200 mg nightly.  - gabapentin (NEURONTIN) 100 MG capsule; Take 1 capsule (100 mg) by mouth 2 times daily.          BMI  Estimated body mass index is 27.9 kg/m  as calculated from the following:    Height as of 12/2/24: 1.905 m (6' 3\").    Weight as of 12/2/24: 101.2 kg (223 lb 3.2 oz).         Subjective   Juan Carlos is a 69 year old, presenting for the following health issues:  Recheck Medication      1/6/2025     6:46 AM   Additional Questions   Roomed by Theresa NICOLE         1/6/2025     6:46 AM   Patient Reported Additional Medications   Patient reports taking the following new medications None per patient     HPI     Medication Followup of gabapentin   Taking Medication as prescribed: yes  Side Effects:  None  Medication Helping Symptoms:  yes        Objective         Phone call completed to recheck gabapentin. Does feel like feet are a bit better.  Is having less pain.  Pain is about 30% improved.  Feels like is less tired and is less dizzy since started this medication.  Overall, is feeling much better.    Vitals:  No vitals were obtained today due to virtual visit.    Physical Exam   General: Alert and no distress " //Respiratory: No audible wheeze, cough, or shortness of breath // Psychiatric:  Appropriate affect, tone, and pace of words        Phone call duration: 12 minutes  Signed Electronically by: LATHA Machuca CNP

## 2025-01-25 DIAGNOSIS — E11.42 TYPE 2 DIABETES MELLITUS WITH POLYNEUROPATHY (H): Primary | ICD-10-CM

## 2025-03-16 DIAGNOSIS — E11.42 TYPE 2 DIABETES MELLITUS WITH POLYNEUROPATHY (H): ICD-10-CM

## 2025-03-17 RX ORDER — GLIPIZIDE 10 MG/1
20 TABLET, FILM COATED, EXTENDED RELEASE ORAL DAILY
Qty: 180 TABLET | Refills: 0 | Status: SHIPPED | OUTPATIENT
Start: 2025-03-17

## 2025-04-24 ENCOUNTER — TELEPHONE (OUTPATIENT)
Dept: FAMILY MEDICINE | Facility: CLINIC | Age: 70
End: 2025-04-24
Payer: COMMERCIAL

## 2025-04-24 NOTE — TELEPHONE ENCOUNTER
Patient Quality Outreach    Patient is due for the following:   Diabetes -  A1C, eGFR, Eye Exam, BP Check, and Foot Exam  Colon Cancer Screening  Physical Annual Wellness Visit      Topic Date Due    Pneumococcal Vaccine (1 of 2 - PCV) Never done    Zoster (Shingles) Vaccine (1 of 2) Never done    Diptheria Tetanus Pertussis (DTAP/TDAP/TD) Vaccine (2 - Td or Tdap) 03/29/2023    Flu Vaccine (1) Never done    COVID-19 Vaccine (2 - 2024-25 season) 09/01/2024       Action(s) Taken:   Patient was scheduled for Medicare annual wellness    Type of outreach:    Phone, spoke to patient/parent. Was able to schedule on 5/28    Questions for provider review:    None         Lucy Fierro MA  Chart routed to None.

## 2025-05-28 ENCOUNTER — RESULTS FOLLOW-UP (OUTPATIENT)
Dept: FAMILY MEDICINE | Facility: CLINIC | Age: 70
End: 2025-05-28

## 2025-05-28 ENCOUNTER — OFFICE VISIT (OUTPATIENT)
Dept: FAMILY MEDICINE | Facility: CLINIC | Age: 70
End: 2025-05-28
Payer: COMMERCIAL

## 2025-05-28 VITALS
HEIGHT: 75 IN | BODY MASS INDEX: 26.73 KG/M2 | WEIGHT: 215 LBS | SYSTOLIC BLOOD PRESSURE: 138 MMHG | RESPIRATION RATE: 16 BRPM | TEMPERATURE: 97 F | HEART RATE: 72 BPM | DIASTOLIC BLOOD PRESSURE: 72 MMHG | OXYGEN SATURATION: 100 %

## 2025-05-28 DIAGNOSIS — Z23 NEED FOR VACCINATION: ICD-10-CM

## 2025-05-28 DIAGNOSIS — E11.42 TYPE 2 DIABETES MELLITUS WITH POLYNEUROPATHY (H): ICD-10-CM

## 2025-05-28 DIAGNOSIS — Z12.11 SCREEN FOR COLON CANCER: ICD-10-CM

## 2025-05-28 DIAGNOSIS — Z00.00 ENCOUNTER FOR MEDICARE ANNUAL WELLNESS EXAM: Primary | ICD-10-CM

## 2025-05-28 LAB
ANION GAP SERPL CALCULATED.3IONS-SCNC: 10 MMOL/L (ref 7–15)
BUN SERPL-MCNC: 18.9 MG/DL (ref 8–23)
CALCIUM SERPL-MCNC: 9.4 MG/DL (ref 8.8–10.4)
CHLORIDE SERPL-SCNC: 104 MMOL/L (ref 98–107)
CREAT SERPL-MCNC: 1.31 MG/DL (ref 0.67–1.17)
EGFRCR SERPLBLD CKD-EPI 2021: 59 ML/MIN/1.73M2
EST. AVERAGE GLUCOSE BLD GHB EST-MCNC: 321 MG/DL
GLUCOSE SERPL-MCNC: 289 MG/DL (ref 70–99)
HBA1C MFR BLD: 12.8 % (ref 0–5.6)
HCO3 SERPL-SCNC: 23 MMOL/L (ref 22–29)
POTASSIUM SERPL-SCNC: 4.8 MMOL/L (ref 3.4–5.3)
SODIUM SERPL-SCNC: 137 MMOL/L (ref 135–145)

## 2025-05-28 PROCEDURE — 80048 BASIC METABOLIC PNL TOTAL CA: CPT | Performed by: NURSE PRACTITIONER

## 2025-05-28 PROCEDURE — 83036 HEMOGLOBIN GLYCOSYLATED A1C: CPT | Performed by: NURSE PRACTITIONER

## 2025-05-28 PROCEDURE — 36415 COLL VENOUS BLD VENIPUNCTURE: CPT | Performed by: NURSE PRACTITIONER

## 2025-05-28 RX ORDER — METFORMIN HYDROCHLORIDE 500 MG/1
TABLET, EXTENDED RELEASE ORAL
Qty: 270 TABLET | Refills: 1 | Status: SHIPPED | OUTPATIENT
Start: 2025-05-28

## 2025-05-28 RX ORDER — PIOGLITAZONE 15 MG/1
TABLET ORAL
Qty: 150 TABLET | Refills: 0 | Status: SHIPPED | OUTPATIENT
Start: 2025-05-28 | End: 2025-08-26

## 2025-05-28 RX ORDER — GLIPIZIDE 10 MG/1
20 TABLET, FILM COATED, EXTENDED RELEASE ORAL DAILY
Qty: 180 TABLET | Refills: 1 | Status: SHIPPED | OUTPATIENT
Start: 2025-05-28

## 2025-05-28 SDOH — HEALTH STABILITY: PHYSICAL HEALTH: ON AVERAGE, HOW MANY DAYS PER WEEK DO YOU ENGAGE IN MODERATE TO STRENUOUS EXERCISE (LIKE A BRISK WALK)?: 4 DAYS

## 2025-05-28 ASSESSMENT — SOCIAL DETERMINANTS OF HEALTH (SDOH): HOW OFTEN DO YOU GET TOGETHER WITH FRIENDS OR RELATIVES?: MORE THAN THREE TIMES A WEEK

## 2025-05-28 NOTE — PATIENT INSTRUCTIONS
Patient Education   Preventive Care Advice   This is general advice given by our system to help you stay healthy. However, your care team may have specific advice just for you. Please talk to your care team about your preventive care needs.  Nutrition  Eat 5 or more servings of fruits and vegetables each day.  Try wheat bread, brown rice and whole grain pasta (instead of white bread, rice, and pasta).  Get enough calcium and vitamin D. Check the label on foods and aim for 100% of the RDA (recommended daily allowance).  Lifestyle  Exercise at least 150 minutes each week  (30 minutes a day, 5 days a week).  Do muscle strengthening activities 2 days a week. These help control your weight and prevent disease.  No smoking.  Wear sunscreen to prevent skin cancer.  Have a dental exam and cleaning every 6 months.  Yearly exams  See your health care team every year to talk about:  Any changes in your health.  Any medicines your care team has prescribed.  Preventive care, family planning, and ways to prevent chronic diseases.  Shots (vaccines)   HPV shots (up to age 26), if you've never had them before.  Hepatitis B shots (up to age 59), if you've never had them before.  COVID-19 shot: Get this shot when it's due.  Flu shot: Get a flu shot every year.  Tetanus shot: Get a tetanus shot every 10 years.  Pneumococcal, hepatitis A, and RSV shots: Ask your care team if you need these based on your risk.  Shingles shot (for age 50 and up)  General health tests  Diabetes screening:  Starting at age 35, Get screened for diabetes at least every 3 years.  If you are younger than age 35, ask your care team if you should be screened for diabetes.  Cholesterol test: At age 39, start having a cholesterol test every 5 years, or more often if advised.  Bone density scan (DEXA): At age 50, ask your care team if you should have this scan for osteoporosis (brittle bones).  Hepatitis C: Get tested at least once in your life.  STIs (sexually  transmitted infections)  Before age 24: Ask your care team if you should be screened for STIs.  After age 24: Get screened for STIs if you're at risk. You are at risk for STIs (including HIV) if:  You are sexually active with more than one person.  You don't use condoms every time.  You or a partner was diagnosed with a sexually transmitted infection.  If you are at risk for HIV, ask about PrEP medicine to prevent HIV.  Get tested for HIV at least once in your life, whether you are at risk for HIV or not.  Cancer screening tests  Cervical cancer screening: If you have a cervix, begin getting regular cervical cancer screening tests starting at age 21.  Breast cancer scan (mammogram): If you've ever had breasts, begin having regular mammograms starting at age 40. This is a scan to check for breast cancer.  Colon cancer screening: It is important to start screening for colon cancer at age 45.  Have a colonoscopy test every 10 years (or more often if you're at risk) Or, ask your provider about stool tests like a FIT test every year or Cologuard test every 3 years.  To learn more about your testing options, visit:   .  For help making a decision, visit:   https://bit.ly/de06917.  Prostate cancer screening test: If you have a prostate, ask your care team if a prostate cancer screening test (PSA) at age 55 is right for you.  Lung cancer screening: If you are a current or former smoker ages 50 to 80, ask your care team if ongoing lung cancer screenings are right for you.  For informational purposes only. Not to replace the advice of your health care provider. Copyright   2023 [facility]. All rights reserved. Clinically reviewed by the [facility]. [employer] [id number] - REV 01/24.  Preventing Falls: Care Instructions  Injuries and health problems such as trouble walking or poor eyesight can increase your risk of falling. So can some medicines. But there are things you can do to help  "prevent falls. You can exercise to get stronger. You can also arrange your home to make it safer.    Talk to your doctor about the medicines you take. Ask if any of them increase the risk of falls and whether they can be changed or stopped.   Try to exercise regularly. It can help improve your strength and balance. This can help lower your risk of falling.         Practice fall safety and prevention.   Wear low-heeled shoes that fit well and give your feet good support. Talk to your doctor if you have foot problems that make this hard.  Carry a cellphone or wear a medical alert device that you can use to call for help.  Use stepladders instead of chairs to reach high objects. Don't climb if you're at risk for falls. Ask for help, if needed.  Wear the correct eyeglasses, if you need them.        Make your home safer.   Remove rugs, cords, clutter, and furniture from walkways.  Keep your house well lit. Use night-lights in hallways and bathrooms.  Install and use sturdy handrails on stairways.  Wear nonskid footwear, even inside. Don't walk barefoot or in socks without shoes.        Be safe outside.   Use handrails, curb cuts, and ramps whenever possible.  Keep your hands free by using a shoulder bag or backpack.  Try to walk in well-lit areas. Watch out for uneven ground, changes in pavement, and debris.  Be careful in the winter. Walk on the grass or gravel when sidewalks are slippery. Use de-icer on steps and walkways. Add non-slip devices to shoes.    Put grab bars and nonskid mats in your shower or tub and near the toilet. Try to use a shower chair or bath bench when bathing.   Get into a tub or shower by putting in your weaker leg first. Get out with your strong side first. Have a phone or medical alert device in the bathroom with you.   Where can you learn more?  Go to https://www.[employer].net/patiented  Enter G117 in the search box to learn more about \"Preventing Falls: Care Instructions.\"  Current as of: " July 31, 2024  Content Version: 14.4    0353-5186 [employer].   Care instructions adapted under license by your healthcare professional. If you have questions about a medical condition or this instruction, always ask your healthcare professional. [employer] disclaims any warranty or liability for your use of this information.    Learning About Stress  What is stress?     Stress is your body's response to a hard situation. Your body can have a physical, emotional, or mental response. Stress is a fact of life for most people, and it affects everyone differently. What causes stress for you may not be stressful for someone else.  A lot of things can cause stress. You may feel stress when you go on a job interview, take a test, or run a race. This kind of short-term stress is normal and even useful. It can help you if you need to work hard or react quickly. For example, stress can help you finish an important job on time.  Long-term stress is caused by ongoing stressful situations or events. Examples of long-term stress include long-term health problems, ongoing problems at work, or conflicts in your family. Long-term stress can harm your health.  How does stress affect your health?  When you are stressed, your body responds as though you are in danger. It makes hormones that speed up your heart, make you breathe faster, and give you a burst of energy. This is called the fight-or-flight stress response. If the stress is over quickly, your body goes back to normal and no harm is done.  But if stress happens too often or lasts too long, it can have bad effects. Long-term stress can make you more likely to get sick, and it can make symptoms of some diseases worse. If you tense up when you are stressed, you may develop neck, shoulder, or low back pain. Stress is linked to high blood pressure and heart disease.  Stress also harms your emotional health. It can make you kidd, tense, or depressed. Your  relationships may suffer, and you may not do well at work or school.  What can you do to manage stress?  You can try these things to help manage stress:   Do something active. Exercise or activity can help reduce stress. Walking is a great way to get started. Even everyday activities such as housecleaning or yard work can help.  Try yoga or nhan chi. These techniques combine exercise and meditation. You may need some training at first to learn them.  Do something you enjoy. For example, listen to music or go to a movie. Practice your hobby or do volunteer work.  Meditate. This can help you relax, because you are not worrying about what happened before or what may happen in the future.  Do guided imagery. Imagine yourself in any setting that helps you feel calm. You can use online videos, books, or a teacher to guide you.  Do breathing exercises. For example:  From a standing position, bend forward from the waist with your knees slightly bent. Let your arms dangle close to the floor.  Breathe in slowly and deeply as you return to a standing position. Roll up slowly and lift your head last.  Hold your breath for just a few seconds in the standing position.  Breathe out slowly and bend forward from the waist.  Let your feelings out. Talk, laugh, cry, and express anger when you need to. Talking with supportive friends or family, a counselor, or a edwar leader about your feelings is a healthy way to relieve stress. Avoid discussing your feelings with people who make you feel worse.  Write. It may help to write about things that are bothering you. This helps you find out how much stress you feel and what is causing it. When you know this, you can find better ways to cope.  What can you do to prevent stress?  You might try some of these things to help prevent stress:  Manage your time. This helps you find time to do the things you want and need to do.  Get enough sleep. Your body recovers from the stresses of the day while  "you are sleeping.  Get support. Your family, friends, and community can make a difference in how you experience stress.  Limit your news feed. Avoid or limit time on social media or news that may make you feel stressed.  Do something active. Exercise or activity can help reduce stress. Walking is a great way to get started.  Where can you learn more?  Go to https://www.[employer].net/patiented  Enter N032 in the search box to learn more about \"Learning About Stress.\"  Current as of: October 24, 2024  Content Version: 14.4    4541-7226 [employer].   Care instructions adapted under license by your healthcare professional. If you have questions about a medical condition or this instruction, always ask your healthcare professional. [employer] disclaims any warranty or liability for your use of this information.    Learning About Sleeping Well  What does sleeping well mean?     Sleeping well means getting enough sleep to feel good and stay healthy. How much sleep is enough varies among people.  The number of hours you sleep and how you feel when you wake up are both important. If you do not feel refreshed, you probably need more sleep. Another sign of not getting enough sleep is feeling tired during the day.  Experts recommend that adults get at least 7 or more hours of sleep per day. Children and older adults need more sleep.  Why is getting enough sleep important?  Getting enough quality sleep is a basic part of good health. When your sleep suffers, your physical health, mood, and your thoughts can suffer too. You may find yourself feeling more grumpy or stressed. Not getting enough sleep also can lead to serious problems, including injury, accidents, anxiety, and depression.  What might cause poor sleeping?  Many things can cause sleep problems, including:  Changes to your sleep schedule.  Stress. Stress can be caused by fear about a single event, such as giving a speech. Or you may have " "ongoing stress, such as worry about work or school.  Depression, anxiety, and other mental or emotional conditions.  Changes in your sleep habits or surroundings. This includes changes that happen where you sleep, such as noise, light, or sleeping in a different bed. It also includes changes in your sleep pattern, such as having jet lag or working a late shift.  Health problems, such as pain, breathing problems, and restless legs syndrome.  Lack of regular exercise.  Using alcohol, nicotine, or caffeine before bed.  How can you help yourself?  Here are some tips that may help you sleep more soundly and wake up feeling more refreshed.  Your sleeping area   Use your bedroom only for sleeping and sex. A bit of light reading may help you fall asleep. But if it doesn't, do your reading elsewhere in the house. Try not to use your TV, computer, smartphone, or tablet while you are in bed.  Be sure your bed is big enough to stretch out comfortably, especially if you have a sleep partner.  Keep your bedroom quiet, dark, and cool. Use curtains, blinds, or a sleep mask to block out light. To block out noise, use earplugs, soothing music, or a \"white noise\" machine.  Your evening and bedtime routine   Create a relaxing bedtime routine. You might want to take a warm shower or bath, or listen to soothing music.  Go to bed at the same time every night. And get up at the same time every morning, even if you feel tired.  What to avoid   Limit caffeine (coffee, tea, caffeinated sodas) during the day, and don't have any for at least 6 hours before bedtime.  Avoid drinking alcohol before bedtime. Alcohol can cause you to wake up more often during the night.  Try not to smoke or use tobacco, especially in the evening. Nicotine can keep you awake.  Limit naps during the day, especially close to bedtime.  Avoid lying in bed awake for too long. If you can't fall asleep or if you wake up in the middle of the night and can't get back to sleep " "within about 20 minutes, get out of bed and go to another room until you feel sleepy.  Avoid taking medicine right before bed that may keep you awake or make you feel hyper or energized. Your doctor can tell you if your medicine may do this and if you can take it earlier in the day.  If you can't sleep   Imagine yourself in a peaceful, pleasant scene. Focus on the details and feelings of being in a place that is relaxing.  Get up and do a quiet or boring activity until you feel sleepy.  Avoid drinking any liquids before going to bed to help prevent waking up often to use the bathroom.  Where can you learn more?  Go to https://www.[employer].net/patiented  Enter J942 in the search box to learn more about \"Learning About Sleeping Well.\"  Current as of: July 31, 2024  Content Version: 14.4    3350-7107 [employer].   Care instructions adapted under license by your healthcare professional. If you have questions about a medical condition or this instruction, always ask your healthcare professional. [employer] disclaims any warranty or liability for your use of this information.       "

## 2025-05-28 NOTE — PROGRESS NOTES
"Preventive Care Visit  [facility] LATHA Winn CNP, Family Medicine  May 28, 2025      Assessment & Plan     Type 2 diabetes mellitus with polyneuropathy (H)  A1c continues to be elevated.  SGLT 2, DPP 4, GLP-1-all cost prohibitive.  Is agreeable to Actos.  Educated on use and possible side effects.  Recommend follow-up appointment in 3 months-declines.  Is agreeable to returning in 6 months.  - Hemoglobin A1c  - Basic metabolic panel  - pioglitazone (ACTOS) 15 MG tablet; Take 1 tablet (15 mg) by mouth daily for 30 days, THEN 2 tablets (30 mg) daily.  - glipiZIDE (GLUCOTROL XL) 10 MG 24 hr tablet; Take 2 tablets (20 mg) by mouth daily.  - metFORMIN (GLUCOPHAGE XR) 500 MG 24 hr tablet; Take 2 tablets (1,000 mg) by mouth daily (with breakfast) AND 1 tablet (500 mg) daily (with dinner).  - FOOT EXAM    Need for vaccination  Declines all vaccines.    Screen for colon cancer  Declines colonoscopy, fit test or Cologuard.    Encounter for Medicare annual wellness exam  Screening guidelines reviewed.    The longitudinal plan of care for the diagnosis(es)/condition(s) as documented were addressed during this visit. Due to the added complexity in care, I will continue to support Juan Carlos in the subsequent management and with ongoing continuity of care.     BMI  Estimated body mass index is 26.87 kg/m  as calculated from the following:    Height as of this encounter: 1.905 m (6' 3\").    Weight as of this encounter: 97.5 kg (215 lb).       Counseling  Appropriate preventive services were addressed with this patient via screening, questionnaire, or discussion as appropriate for fall prevention, nutrition, physical activity, Tobacco-use cessation, social engagement, weight loss and cognition.  Checklist reviewing preventive services available has been given to the patient.  Reviewed patient's diet, addressing concerns and/or questions.   The patient was instructed to see the dentist every 6 months. "   He is at risk for psychosocial distress and has been provided with information to reduce risk.   Discussed possible causes of fatigue.     Scott Rangel is a 70 year old, presenting for the following:  Physical        5/28/2025     7:52 AM   Additional Questions   Roomed by Rosalinda Arriaga CMA       Via the Health Maintenance questionnaire, the patient has reported the following services have been completed -Eye Exam: [facility] 2024-05-01, this information has been sent to the abstraction team.    HPI       Diabetes Follow-up    BP Readings from Last 2 Encounters:   05/28/25 (!) 140/78   12/02/24 134/80     Hemoglobin A1C (%)   Date Value   05/28/2025 12.8 (H)   12/02/2024 12.9 (H)   04/01/2021 11.5 (H)   11/06/2019 9.1 (H)     LDL Cholesterol Calculated (mg/dL)   Date Value   08/28/2024 94   02/21/2024 105 (H)   04/01/2021 83   05/09/2019 67           Advance Care Planning    Discussed advance care planning with patient; informed AVS has link to [employer] [employer].        5/28/2025   General Health   How would you rate your overall physical health? Good   Feel stress (tense, anxious, or unable to sleep) Rather much   (!) STRESS CONCERN      5/28/2025   Nutrition   Diet: Diabetic         5/28/2025   Exercise   Days per week of moderate/strenous exercise 4 days         5/28/2025   Social Factors   Frequency of gathering with friends or relatives More than three times a week   Worry food won't last until get money to buy more No   Food not last or not have enough money for food? No   Do you have housing? (Housing is defined as stable permanent housing and does not include staying outside in a car, in a tent, in an abandoned building, in an overnight shelter, or couch-surfing.) Yes   Are you worried about losing your housing? No   Lack of transportation? No   Unable to get utilities (heat,electricity)? No         5/28/2025   Fall Risk   Fallen 2 or more times in the past year? No   Trouble with walking or  balance? Yes           5/28/2025   Activities of Daily Living- Home Safety   Needs help with the following daily activites None of the above   Safety concerns in the home None of the above         5/28/2025   Dental   Dentist two times every year? (!) NO         5/28/2025   Hearing Screening   Hearing concerns? None of the above         5/28/2025   Driving Risk Screening   Patient/family members have concerns about driving No         5/28/2025   General Alertness/Fatigue Screening   Have you been more tired than usual lately? (!) YES         5/28/2025   Urinary Incontinence Screening   Bothered by leaking urine in past 6 months No         Today's PHQ-2 Score:       5/28/2025     7:51 AM   PHQ-2 ( 1999 [employer])   Q1: Little interest or pleasure in doing things 0    Q2: Feeling down, depressed or hopeless 0    PHQ-2 Score 0    Q1: Little interest or pleasure in doing things Not at all   Q2: Feeling down, depressed or hopeless Not at all   PHQ-2 Score 0       Proxy-reported           5/28/2025   Substance Use   Alcohol more than 3/day or more than 7/wk No   Do you have a current opioid prescription? No   How severe/bad is pain from 1 to 10? 0/10 (No Pain)   Do you use any other substances recreationally? No     Social History     Tobacco Use    Smoking status: Never    Smokeless tobacco: Never   Vaping Use    Vaping status: Never Used   Substance Use Topics    Alcohol use: Never    Drug use: No           5/28/2025   AAA Screening   Family history of Abdominal Aortic Aneurysm (AAA)? No   Last PSA:   PSA   Date Value Ref Range Status   04/23/2007 0.93 0 - 4 ug/L Final     ASCVD Risk   The 10-year ASCVD risk score (May BENNETT, et al., 2019) is: 34%    Values used to calculate the score:      Age: 70 years      Sex: Male      Is Non- : No      Diabetic: Yes      Tobacco smoker: No      Systolic Blood Pressure: 140 mmHg      Is BP treated: No      HDL Cholesterol: 42 mg/dL      Total  Cholesterol: 170 mg/dL            Reviewed and updated as needed this visit by Provider                    Current providers sharing in care for this patient include:  Patient Care Team:  Shira Denson APRN CNP as PCP - General (Nurse Practitioner - Family)  Shira Denson APRN CNP as Assigned PCP  Jeana Gamboa APRN CNP as Nurse Practitioner (Cardiovascular Disease)  Jeana Gamboa APRN CNP as Assigned Heart and Vascular Provider    The following health maintenance items are reviewed in Epic and correct as of today:  Health Maintenance   Topic Date Due    PNEUMOCOCCAL VACCINE 50+ YEARS (1 of 2 - PCV) Never done    ZOSTER VACCINE (1 of 2) Never done    RSV VACCINE (1 - Risk 60-74 years 1-dose series) Never done    COLORECTAL CANCER SCREENING  04/23/2017    MEDICARE ANNUAL WELLNESS VISIT  Never done    DTAP/TDAP/TD VACCINE (2 - Td or Tdap) 03/29/2023    ADVANCE CARE PLANNING  05/09/2024    DIABETIC FOOT EXAM  07/12/2024    COVID-19 VACCINE (2 - 2024-25 season) 09/01/2024    EYE EXAM  04/19/2025    A1C  08/28/2025    LIPID  08/28/2025    INFLUENZA VACCINE (Season Ended) 09/01/2025    BMP  12/02/2025    MICROALBUMIN  12/02/2025    ANNUAL REVIEW OF HM ORDERS  12/02/2025    HEMOGLOBIN  12/02/2025    FALL RISK ASSESSMENT  05/28/2026    HEPATITIS C SCREENING  Completed    PHQ-2 (once per calendar year)  Completed    URINALYSIS  Completed    HPV VACCINE  Aged Out    MENINGITIS VACCINE  Aged Out    TSH W/FREE T4 REFLEX  Discontinued       Yasemin ineffective, stopped taking  Stopped statin-hands less painful  Right hand numbness comes and goes    Last PSA: No family history   Last Colonoscopy: No family history   Last eye exam:  Last dental exam:  Last tetanus vaccine:  Last influenza vaccine:  Last shingles vaccine:  Last pneumonia vaccine:  Last RSV vaccine:  Last COVID booster:  Hep C screen:  HIV screen:  Contraception: N/A  AAA screen (age 65-75 with smoking hx):  IVD (HTN,  "Hyperlipid, Smoking):  Lung CA screening (50-77, 20 pk smoking hx) Quit within 15 years:       Objective    Exam  BP (!) 140/78 (BP Location: Left arm, Patient Position: Sitting, Cuff Size: Adult Regular)   Pulse 72   Temp 97  F (36.1  C) (Temporal)   Resp 16   Ht 1.905 m (6' 3\")   Wt 97.5 kg (215 lb)   SpO2 100%   BMI 26.87 kg/m     Estimated body mass index is 26.87 kg/m  as calculated from the following:    Height as of this encounter: 1.905 m (6' 3\").    Weight as of this encounter: 97.5 kg (215 lb).    Physical Exam  Constitutional:       Appearance: He is well-developed.   HENT:      Right Ear: Tympanic membrane and external ear normal.      Left Ear: Tympanic membrane and external ear normal.      Mouth/Throat:      Mouth: Mucous membranes are moist.      Pharynx: Uvula midline.   Neck:      Thyroid: No thyromegaly.      Vascular: No carotid bruit.   Cardiovascular:      Rate and Rhythm: Normal rate and regular rhythm.      Heart sounds: Normal heart sounds.   Pulmonary:      Effort: Pulmonary effort is normal.      Breath sounds: Normal breath sounds.   Abdominal:      General: Bowel sounds are normal.      Palpations: Abdomen is soft.   Musculoskeletal:      Right lower leg: No edema.      Left lower leg: No edema.   Skin:     General: Skin is warm and dry.   Neurological:      Mental Status: He is alert.   Psychiatric:         Mood and Affect: Mood normal.     Diabetic foot exam: normal DP and PT pulses, no trophic changes or ulcerative lesions, reduced sensation at 6/10 sites bilat, and dry cracking heels         5/28/2025   Mini Cog   Clock Draw Score 2 Normal   3 Item Recall 1 object recalled   Mini Cog Total Score 3              Signed Electronically by: LATHA Machuca CNP    "

## 2025-08-08 DIAGNOSIS — E78.5 HYPERLIPIDEMIA LDL GOAL <100: ICD-10-CM

## 2025-08-12 ENCOUNTER — TELEPHONE (OUTPATIENT)
Dept: FAMILY MEDICINE | Facility: CLINIC | Age: 70
End: 2025-08-12
Payer: COMMERCIAL

## 2025-08-13 RX ORDER — ROSUVASTATIN CALCIUM 20 MG/1
20 TABLET, COATED ORAL EVERY OTHER DAY
Qty: 45 TABLET | Refills: 3 | Status: SHIPPED | OUTPATIENT
Start: 2025-08-13

## 2025-08-18 DIAGNOSIS — E11.42 TYPE 2 DIABETES MELLITUS WITH POLYNEUROPATHY (H): ICD-10-CM

## 2025-08-19 RX ORDER — PIOGLITAZONE 30 MG/1
30 TABLET ORAL DAILY
Qty: 90 TABLET | Refills: 0 | Status: SHIPPED | OUTPATIENT
Start: 2025-08-19